# Patient Record
Sex: FEMALE | Race: WHITE | Employment: OTHER | URBAN - NONMETROPOLITAN AREA
[De-identification: names, ages, dates, MRNs, and addresses within clinical notes are randomized per-mention and may not be internally consistent; named-entity substitution may affect disease eponyms.]

---

## 2017-01-04 ENCOUNTER — MED REC SCAN ONLY (OUTPATIENT)
Dept: FAMILY MEDICINE CLINIC | Facility: CLINIC | Age: 82
End: 2017-01-04

## 2017-01-04 ENCOUNTER — TELEPHONE (OUTPATIENT)
Dept: FAMILY MEDICINE CLINIC | Facility: CLINIC | Age: 82
End: 2017-01-04

## 2017-01-04 NOTE — TELEPHONE ENCOUNTER
Calling back on behalf of pt - she is in the hospital at [facility].  Call daughter if you need anything  EDDIE

## 2017-01-12 RX ORDER — RIVAROXABAN 20 MG/1
TABLET, FILM COATED ORAL
Qty: 30 TABLET | Status: SHIPPED | OUTPATIENT
Start: 2017-01-12 | End: 2018-02-07

## 2017-01-20 ENCOUNTER — OFFICE VISIT (OUTPATIENT)
Dept: FAMILY MEDICINE CLINIC | Facility: CLINIC | Age: 82
End: 2017-01-20

## 2017-01-20 VITALS
BODY MASS INDEX: 35 KG/M2 | DIASTOLIC BLOOD PRESSURE: 80 MMHG | TEMPERATURE: 99 F | SYSTOLIC BLOOD PRESSURE: 134 MMHG | WEIGHT: 200 LBS

## 2017-01-20 DIAGNOSIS — J20.9 BRONCHITIS WITH BRONCHOSPASM: Primary | ICD-10-CM

## 2017-01-20 PROCEDURE — 99214 OFFICE O/P EST MOD 30 MIN: CPT | Performed by: FAMILY MEDICINE

## 2017-01-20 RX ORDER — CEFUROXIME AXETIL 250 MG/1
250 TABLET ORAL 2 TIMES DAILY
Qty: 20 TABLET | Refills: 0 | Status: SHIPPED | OUTPATIENT
Start: 2017-01-20 | End: 2017-05-01 | Stop reason: ALTCHOICE

## 2017-01-20 RX ORDER — FLUTICASONE PROPIONATE AND SALMETEROL 250; 50 UG/1; UG/1
1 POWDER RESPIRATORY (INHALATION) EVERY 12 HOURS SCHEDULED
Qty: 60 EACH | Refills: 0 | COMMUNITY
Start: 2017-01-20 | End: 2017-05-01 | Stop reason: ALTCHOICE

## 2017-01-20 RX ORDER — ALBUTEROL SULFATE 90 UG/1
2 AEROSOL, METERED RESPIRATORY (INHALATION) EVERY 6 HOURS PRN
Qty: 1 INHALER | Refills: 0 | Status: SHIPPED | OUTPATIENT
Start: 2017-01-20 | End: 2019-04-30

## 2017-01-20 RX ORDER — FUROSEMIDE 20 MG/1
TABLET ORAL
Refills: 0 | COMMUNITY
Start: 2017-01-18 | End: 2017-04-15

## 2017-01-20 NOTE — PROGRESS NOTES
Naz Crocker is a 80year old female. Patient presents with: Other: cold symptoms; chest congestion, cough, both eye itching, nasal drainage. Светлана Spies tylenol is not effective. room 1      HPI:   Patient complains of cough and chest congestion.   She has been Diabetes mellitus type 2, diet-controlled ([facility] 1/23/2013   • Urethral stenosis 03-     dr. Jocy Abarca   • Spinal stenosis, lumbar 8/11/2014   • Arrhythmia      a fib   • Osteoarthritis 10/14/2013   • Microalbuminuria 8/11/2014   • Ventral hernia 8/11/2 Procedure: LUMBAR EPIDURAL;  Surgeon: Ngozi Sargent MD;  Location: [facility] FOR PAIN MANAGEMENT    PATIENT [address] FOR IV ANTIBIOTIC SURGICAL SITE INFECTION PROPHYLAXIS.  N/A 9/8/2014    Comment Procedure: LUMBAR EPIDURAL;  Surgeon: Meredith Elias MD;  Location: [facility] FOR PAIN MANAGEMENT    PATIENT [address] FOR IV ANTIBIOTIC SURGICAL SITE INFECTION PROPHYLAXIS.  N/A 9/15/2015    Comment Procedure: LUMBAR EPIDURAL;  Surgeon: Meredith Elias MD;  Location: [facility] improvement or anytime PRN. Discussed reactive airway disease. Explained there are multiple known triggers including allergies, upper respiratory infections, exercise, emotional stress and often times a trigger cannot be identified.  Explained that two pro

## 2017-02-23 ENCOUNTER — TELEPHONE (OUTPATIENT)
Dept: FAMILY MEDICINE CLINIC | Facility: CLINIC | Age: 82
End: 2017-02-23

## 2017-02-23 RX ORDER — POTASSIUM CHLORIDE 750 MG/1
10 TABLET, FILM COATED, EXTENDED RELEASE ORAL
Qty: 30 TABLET | Refills: 0 | Status: SHIPPED | OUTPATIENT
Start: 2017-02-23 | End: 2017-03-04

## 2017-03-02 RX ORDER — POTASSIUM CHLORIDE 750 MG/1
TABLET, FILM COATED, EXTENDED RELEASE ORAL
Qty: 30 TABLET | Refills: 0 | OUTPATIENT
Start: 2017-03-02

## 2017-03-04 ENCOUNTER — TELEPHONE (OUTPATIENT)
Dept: FAMILY MEDICINE CLINIC | Facility: CLINIC | Age: 82
End: 2017-03-04

## 2017-03-04 RX ORDER — CARVEDILOL 6.25 MG/1
TABLET ORAL
Qty: 60 TABLET | Refills: 0 | Status: CANCELLED | OUTPATIENT
Start: 2017-03-04

## 2017-03-04 RX ORDER — CARVEDILOL 6.25 MG/1
6.25 TABLET ORAL 2 TIMES DAILY
Qty: 60 TABLET | Refills: 3 | Status: SHIPPED | OUTPATIENT
Start: 2017-03-04 | End: 2017-07-08

## 2017-03-04 RX ORDER — POTASSIUM CHLORIDE 750 MG/1
TABLET, FILM COATED, EXTENDED RELEASE ORAL
Qty: 30 TABLET | Refills: 0 | Status: SHIPPED | OUTPATIENT
Start: 2017-03-04 | End: 2017-04-26

## 2017-03-04 RX ORDER — LISINOPRIL 10 MG/1
TABLET ORAL
Qty: 30 TABLET | Refills: 3 | Status: SHIPPED | OUTPATIENT
Start: 2017-03-04 | End: 2017-08-01 | Stop reason: ALTCHOICE

## 2017-03-04 RX ORDER — LISINOPRIL 10 MG/1
TABLET ORAL
Qty: 30 TABLET | Refills: 0 | Status: CANCELLED | OUTPATIENT
Start: 2017-03-04

## 2017-04-15 ENCOUNTER — TELEPHONE (OUTPATIENT)
Dept: FAMILY MEDICINE CLINIC | Facility: CLINIC | Age: 82
End: 2017-04-15

## 2017-04-15 RX ORDER — FUROSEMIDE 20 MG/1
TABLET ORAL
Qty: 30 TABLET | Refills: 0 | Status: SHIPPED | OUTPATIENT
Start: 2017-04-15 | End: 2017-04-15

## 2017-04-15 NOTE — TELEPHONE ENCOUNTER
When pt was in the hospital in January, they cut this script strenghth in half to 10mg. She wonders if she needs to go back to 20? Her legs and feet swell much more on the 10mg than they did before? ?      When I reviewed the hospital notes from [address] [place] [address] admit

## 2017-04-17 RX ORDER — FUROSEMIDE 20 MG/1
TABLET ORAL
Qty: 90 TABLET | Refills: 0 | Status: SHIPPED | OUTPATIENT
Start: 2017-04-17 | End: 2018-07-17

## 2017-04-26 RX ORDER — POTASSIUM CHLORIDE 750 MG/1
TABLET, FILM COATED, EXTENDED RELEASE ORAL
Qty: 30 TABLET | Refills: 0 | Status: SHIPPED | OUTPATIENT
Start: 2017-04-26 | End: 2017-05-27

## 2017-05-01 ENCOUNTER — OFFICE VISIT (OUTPATIENT)
Dept: FAMILY MEDICINE CLINIC | Facility: CLINIC | Age: 82
End: 2017-05-01

## 2017-05-01 ENCOUNTER — TELEPHONE (OUTPATIENT)
Dept: FAMILY MEDICINE CLINIC | Facility: CLINIC | Age: 82
End: 2017-05-01

## 2017-05-01 VITALS
WEIGHT: 198.38 LBS | DIASTOLIC BLOOD PRESSURE: 80 MMHG | OXYGEN SATURATION: 96 % | BODY MASS INDEX: 35 KG/M2 | SYSTOLIC BLOOD PRESSURE: 148 MMHG | HEART RATE: 73 BPM | TEMPERATURE: 99 F

## 2017-05-01 DIAGNOSIS — N30.00 ACUTE CYSTITIS WITHOUT HEMATURIA: Primary | ICD-10-CM

## 2017-05-01 DIAGNOSIS — I10 ESSENTIAL HYPERTENSION: ICD-10-CM

## 2017-05-01 PROCEDURE — 99214 OFFICE O/P EST MOD 30 MIN: CPT | Performed by: FAMILY MEDICINE

## 2017-05-01 NOTE — PROGRESS NOTES
Britton Parada is a 80year old female. Patient presents with: Other: f/up from [address] on 4/29 for not feeling well, weakness and sweats, fatigue, shakey, no appetite, leg cramp (in hospital)-RT worse--high BP in ER. ...room 2      HPI:   Patient • Cerebral vascular accident, 1/8/2013, right occipital 1/23/2013   • Diabetes mellitus type 2, diet-controlled ([facility] 1/23/2013   • Urethral stenosis 03-     dr. Inés Blandon   • Spinal stenosis, lumbar 8/11/2014   • Arrhythmia      a fib   • Osteoarthr FLUOR GID & [address] [facility] NDL/CATH SPI DX/THER NJX N/A 9/8/2014    Comment Procedure: LUMBAR EPIDURAL;  Surgeon: Kelly Jacques MD;  Location: [facility] FOR PAIN MANAGEMENT    PATIENT [address] FOR IV ANTIBIOTIC SURGICAL SITE INFECTION PROPHYL 9/15/2015    Comment Procedure: LUMBAR EPIDURAL;  Surgeon: Go Esquivel MD;  Location: [address] [place] [address] FOR PAIN MANAGEMENT    PATIENT [address] FOR IV ANTIBIOTIC SURGICAL SITE INFECTION PROPHYLAXIS.  N/A 9/15/2015    Comment Procedure: Baron Dumont with those results as available. We had a long discussion about her need to increase her water intake. Also reviewed all of her current medications in the ER records. Reviewed her blood pressures at home.   They have been running in the 140s-150s systoli

## 2017-05-01 NOTE — TELEPHONE ENCOUNTER
I called [facility] and she advised that they took her mom to [address] ED Saturday night because she got really weak really fast   While at [facility] her BP a few times got really high     The doctor was not sure if her anxiety increases her BP or if her elevated BP caused

## 2017-05-10 ENCOUNTER — TELEPHONE (OUTPATIENT)
Dept: FAMILY MEDICINE CLINIC | Facility: CLINIC | Age: 82
End: 2017-05-10

## 2017-05-10 NOTE — TELEPHONE ENCOUNTER
Calling the patient- she has these UTI's too often and this last one she had no symptoms and then she ended up in the ER. She is wondering if she could drop a urine off every 6-8weeks for me to check to make sure that she is ok? ?     I advised that is fi

## 2017-05-13 NOTE — TELEPHONE ENCOUNTER
Last RF 12/29/16 #60x2  Last ov 5/1/17  Last labs 1/6/17  Future Appointments  Date Time Provider Sonal Ferguson   6/16/2017 1:00 PM Tevin Barton DO [facility] [place]

## 2017-05-15 RX ORDER — DRONEDARONE 400 MG/1
TABLET, FILM COATED ORAL
Qty: 60 TABLET | Refills: 0 | Status: SHIPPED | OUTPATIENT
Start: 2017-05-15 | End: 2017-06-19

## 2017-05-27 RX ORDER — POTASSIUM CHLORIDE 750 MG/1
TABLET, FILM COATED, EXTENDED RELEASE ORAL
Qty: 30 TABLET | Refills: 0 | Status: SHIPPED | OUTPATIENT
Start: 2017-05-27 | End: 2017-06-27

## 2017-05-27 NOTE — TELEPHONE ENCOUNTER
Last rf 4/26/17 #30x0  Last ov 5/1/17  148/80  Last labs 1/6/17     Future Appointments  Date Time Provider Sonal Ferguson   6/16/2017 1:00 PM Jennifer Solis DO [facility] [place]

## 2017-06-01 ENCOUNTER — NURSE ONLY (OUTPATIENT)
Dept: FAMILY MEDICINE CLINIC | Facility: CLINIC | Age: 82
End: 2017-06-01

## 2017-06-01 DIAGNOSIS — N39.0 RECURRENT UTI: Primary | ICD-10-CM

## 2017-06-01 PROCEDURE — 87086 URINE CULTURE/COLONY COUNT: CPT | Performed by: FAMILY MEDICINE

## 2017-06-01 PROCEDURE — 81003 URINALYSIS AUTO W/O SCOPE: CPT | Performed by: FAMILY MEDICINE

## 2017-06-01 PROCEDURE — 87186 SC STD MICRODIL/AGAR DIL: CPT | Performed by: FAMILY MEDICINE

## 2017-06-01 PROCEDURE — 87077 CULTURE AEROBIC IDENTIFY: CPT | Performed by: FAMILY MEDICINE

## 2017-06-01 RX ORDER — CEPHALEXIN 500 MG/1
500 CAPSULE ORAL 3 TIMES DAILY
Qty: 21 CAPSULE | Refills: 0 | Status: SHIPPED | OUTPATIENT
Start: 2017-06-01 | End: 2017-06-08 | Stop reason: WASHOUT

## 2017-06-01 NOTE — PROGRESS NOTES
Quick Note:    Urine appears infected. Send for culture.   Start on Keflex 500 mg 3 times a day for 1 week.  ______

## 2017-06-16 ENCOUNTER — APPOINTMENT (OUTPATIENT)
Dept: LAB | Age: 82
End: 2017-06-16
Attending: FAMILY MEDICINE
Payer: MEDICARE

## 2017-06-16 ENCOUNTER — OFFICE VISIT (OUTPATIENT)
Dept: FAMILY MEDICINE CLINIC | Facility: CLINIC | Age: 82
End: 2017-06-16

## 2017-06-16 VITALS
DIASTOLIC BLOOD PRESSURE: 80 MMHG | BODY MASS INDEX: 36.11 KG/M2 | TEMPERATURE: 99 F | WEIGHT: 201.25 LBS | HEART RATE: 82 BPM | HEIGHT: 62.5 IN | RESPIRATION RATE: 18 BRPM | SYSTOLIC BLOOD PRESSURE: 136 MMHG

## 2017-06-16 DIAGNOSIS — R30.0 DYSURIA: ICD-10-CM

## 2017-06-16 DIAGNOSIS — I50.22 CHRONIC SYSTOLIC CONGESTIVE HEART FAILURE (HCC): ICD-10-CM

## 2017-06-16 DIAGNOSIS — E11.9 DIABETES MELLITUS TYPE 2, DIET-CONTROLLED (HCC): ICD-10-CM

## 2017-06-16 DIAGNOSIS — I10 ESSENTIAL HYPERTENSION: ICD-10-CM

## 2017-06-16 DIAGNOSIS — Z00.00 ENCOUNTER FOR ANNUAL HEALTH EXAMINATION: Primary | ICD-10-CM

## 2017-06-16 DIAGNOSIS — I48.20 CHRONIC ATRIAL FIBRILLATION (HCC): ICD-10-CM

## 2017-06-16 DIAGNOSIS — Z13.31 DEPRESSION SCREENING: ICD-10-CM

## 2017-06-16 DIAGNOSIS — N30.00 ACUTE CYSTITIS WITHOUT HEMATURIA: ICD-10-CM

## 2017-06-16 PROCEDURE — G0444 DEPRESSION SCREEN ANNUAL: HCPCS | Performed by: FAMILY MEDICINE

## 2017-06-16 PROCEDURE — 82043 UR ALBUMIN QUANTITATIVE: CPT

## 2017-06-16 PROCEDURE — 80053 COMPREHEN METABOLIC PANEL: CPT

## 2017-06-16 PROCEDURE — 81003 URINALYSIS AUTO W/O SCOPE: CPT | Performed by: FAMILY MEDICINE

## 2017-06-16 PROCEDURE — 83036 HEMOGLOBIN GLYCOSYLATED A1C: CPT

## 2017-06-16 PROCEDURE — G0439 PPPS, SUBSEQ VISIT: HCPCS | Performed by: FAMILY MEDICINE

## 2017-06-16 PROCEDURE — 36415 COLL VENOUS BLD VENIPUNCTURE: CPT | Performed by: FAMILY MEDICINE

## 2017-06-16 PROCEDURE — 82570 ASSAY OF URINE CREATININE: CPT

## 2017-06-16 PROCEDURE — 36415 COLL VENOUS BLD VENIPUNCTURE: CPT

## 2017-06-16 RX ORDER — CIPROFLOXACIN 250 MG/1
250 TABLET, FILM COATED ORAL 2 TIMES DAILY
Qty: 14 TABLET | Refills: 0 | Status: SHIPPED | OUTPATIENT
Start: 2017-06-16 | End: 2017-06-26

## 2017-06-16 NOTE — PATIENT INSTRUCTIONS
Lisa Chatterjee's SCREENING SCHEDULE   Tests on this list are recommended by your physician but may not be covered, or covered at this frequency, by your insurer. Please check with your insurance carrier before scheduling to verify coverage.    PREVENTATI smoked more than 100 cigarettes in their lifetime   • Anyone with a family history    Colorectal Cancer Screening  Covered up to Age 76     Colonoscopy Screen   Covered every 10 years- more often if abnormal There are no preventive care reminders to joon orders found for this or any previous visit.  Please get every year    Pneumococcal 13 (Prevnar)  Covered Once after 65   Orders placed or performed in visit on 06/09/16  -PNEUMOCOCCAL VACC, 13 LAZRAO IM    Please get once after your 65th birthday    Nimisha Malik Directives.

## 2017-06-16 NOTE — PROGRESS NOTES
HPI:   Tarsha Odonnell is a 80year old female who presents for a Medicare Subsequent Annual Wellness visit (Pt already had Initial Annual Wellness).     No complaints          Patient Care Team: Patient Care Team:  Benito Calhoun DO as PCP - General Pain.      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 Pneumococcal, Zoster, Tetanus     Immunization History   Administered Date(s) Administered   • Fluzone Vaccine Medicare () 09/24/2013   • HIGH DOSE FLU 65 YRS AND OLDER PRSV FREE SINGLE D (38734) FLU CLINIC 11/06/2015   • Influenza 09/06/2012, 10/13/2 provided information      [facility] on file in Epic:    Iman Menjivar does not have a Power of  for [employer] on file in [place].  Discussed with patient and provided information          PLAN:  The patient indicates understandin medically necessary Electrocardiogram date07/21/2015       Colorectal Cancer Screening      Colonoscopy Screen every 10 years There are no preventive care reminders to display for this patient.  Update Health Maintenance if applicable    Flex Sigmoidoscopy Not covered by Medicare Part B No vaccine history found This may be covered with your prescription benefits, but Medicare does not cover unless Medically needed    Zoster  Not covered by Medicare Part B No vaccine history found This may be covered with you

## 2017-06-17 DIAGNOSIS — I10 ESSENTIAL HYPERTENSION: Primary | ICD-10-CM

## 2017-06-17 DIAGNOSIS — E11.9 DIABETES MELLITUS TYPE 2, DIET-CONTROLLED (HCC): ICD-10-CM

## 2017-06-17 NOTE — PROGRESS NOTES
Quick Note:    Notify urinary microalbumin is normal.   This test is an early indicator of kidney disease. Repeat in 1 year. Notify chemistry profile is unremarkable. Recheck in 12 months. Notify HGbA1C is controlled. Recheck in 12 months.     .  __

## 2017-06-19 ENCOUNTER — TELEPHONE (OUTPATIENT)
Dept: FAMILY MEDICINE CLINIC | Facility: CLINIC | Age: 82
End: 2017-06-19

## 2017-06-19 RX ORDER — DRONEDARONE 400 MG/1
TABLET, FILM COATED ORAL
Qty: 60 TABLET | Refills: 0 | Status: SHIPPED | OUTPATIENT
Start: 2017-06-19 | End: 2017-07-20

## 2017-06-19 NOTE — TELEPHONE ENCOUNTER
nanette called:     PT ON CIPRO & MULTAQ--POSSIBILITYOF IRREGULAR HEART BEAT WITH BOTH, IS DR Blanca Ansari

## 2017-06-21 ENCOUNTER — TELEPHONE (OUTPATIENT)
Dept: FAMILY MEDICINE CLINIC | Facility: CLINIC | Age: 82
End: 2017-06-21

## 2017-06-27 RX ORDER — POTASSIUM CHLORIDE 750 MG/1
TABLET, FILM COATED, EXTENDED RELEASE ORAL
Qty: 30 TABLET | Refills: 0 | Status: SHIPPED | OUTPATIENT
Start: 2017-06-27 | End: 2017-08-01

## 2017-07-08 RX ORDER — CARVEDILOL 6.25 MG/1
TABLET ORAL
Qty: 60 TABLET | Refills: 0 | Status: SHIPPED | OUTPATIENT
Start: 2017-07-08 | End: 2017-08-01

## 2017-07-10 RX ORDER — LISINOPRIL 10 MG/1
TABLET ORAL
Qty: 90 TABLET | Refills: 2 | Status: SHIPPED | OUTPATIENT
Start: 2017-07-10 | End: 2018-04-05

## 2017-07-10 RX ORDER — FUROSEMIDE 40 MG/1
TABLET ORAL
Qty: 90 TABLET | Refills: 2 | Status: SHIPPED | OUTPATIENT
Start: 2017-07-10 | End: 2017-07-11 | Stop reason: DRUGHIGH

## 2017-07-10 RX ORDER — CARVEDILOL 6.25 MG/1
TABLET ORAL
Qty: 180 TABLET | Refills: 0 | Status: SHIPPED | OUTPATIENT
Start: 2017-07-10 | End: 2018-01-04

## 2017-07-11 ENCOUNTER — TELEPHONE (OUTPATIENT)
Dept: FAMILY MEDICINE CLINIC | Facility: CLINIC | Age: 82
End: 2017-07-11

## 2017-07-11 ENCOUNTER — NURSE ONLY (OUTPATIENT)
Dept: FAMILY MEDICINE CLINIC | Facility: CLINIC | Age: 82
End: 2017-07-11

## 2017-07-11 DIAGNOSIS — R53.83 FATIGUE, UNSPECIFIED TYPE: Primary | ICD-10-CM

## 2017-07-11 LAB
APPEARANCE: CLEAR
NITRITE, URINE: POSITIVE
PH, URINE: 6.5 (ref 4.5–8)
SPECIFIC GRAVITY: 1.02 (ref 1–1.03)
URINE-COLOR: YELLOW
UROBILINOGEN,SEMI-QN: 0.2 MG/DL (ref 0–1.9)

## 2017-07-11 PROCEDURE — 87086 URINE CULTURE/COLONY COUNT: CPT | Performed by: FAMILY MEDICINE

## 2017-07-11 PROCEDURE — 87186 SC STD MICRODIL/AGAR DIL: CPT | Performed by: FAMILY MEDICINE

## 2017-07-11 PROCEDURE — 81003 URINALYSIS AUTO W/O SCOPE: CPT | Performed by: FAMILY MEDICINE

## 2017-07-11 PROCEDURE — 87077 CULTURE AEROBIC IDENTIFY: CPT | Performed by: FAMILY MEDICINE

## 2017-07-11 RX ORDER — CEPHALEXIN 500 MG/1
500 CAPSULE ORAL 3 TIMES DAILY
Qty: 21 CAPSULE | Refills: 0 | Status: SHIPPED | OUTPATIENT
Start: 2017-07-11 | End: 2017-07-18 | Stop reason: WASHOUT

## 2017-07-11 NOTE — TELEPHONE ENCOUNTER
The patient should only be on 20mg and she can split the 40mg tablets.    Eddie sent a refill request for the 40mg tablets and we approved it so it was an error on both ends     The last refill we did was in April 2017 was 20mg   I advised that she can

## 2017-07-11 NOTE — PROGRESS NOTES
Notify urine looks suspicious for an infection. Send for culture.   Start on Keflex 500 mg 1 tablet 3 times a day for the next 7 days

## 2017-07-11 NOTE — TELEPHONE ENCOUNTER
Spoke with Koko James, daughter, she slept a lot yesterday and the last time she had a UTI this was the only symptom that she had and she ended up in the hospital for UTI    Her coloring is good, she doesn't think that she is dehydrated because she has already

## 2017-07-20 RX ORDER — DRONEDARONE 400 MG/1
TABLET, FILM COATED ORAL
Qty: 60 TABLET | Status: SHIPPED | OUTPATIENT
Start: 2017-07-20 | End: 2018-08-10

## 2017-07-24 ENCOUNTER — NURSE ONLY (OUTPATIENT)
Dept: FAMILY MEDICINE CLINIC | Facility: CLINIC | Age: 82
End: 2017-07-24

## 2017-07-24 ENCOUNTER — TELEPHONE (OUTPATIENT)
Dept: FAMILY MEDICINE CLINIC | Facility: CLINIC | Age: 82
End: 2017-07-24

## 2017-07-24 VITALS — SYSTOLIC BLOOD PRESSURE: 138 MMHG | DIASTOLIC BLOOD PRESSURE: 86 MMHG

## 2017-07-24 DIAGNOSIS — N39.0 CHRONIC UTI: Primary | ICD-10-CM

## 2017-07-24 LAB
APPEARANCE: CLEAR
BILIRUBIN: NEGATIVE
GLUCOSE (URINE DIPSTICK): NEGATIVE MG/DL
KETONES (URINE DIPSTICK): NEGATIVE MG/DL
NITRITE, URINE: POSITIVE
PH, URINE: 6 (ref 4.5–8)
PROTEIN (URINE DIPSTICK): NEGATIVE MG/DL
SPECIFIC GRAVITY: 1.01 (ref 1–1.03)
URINE-COLOR: YELLOW
UROBILINOGEN,SEMI-QN: 0.2 MG/DL (ref 0–1.9)

## 2017-07-24 PROCEDURE — 87086 URINE CULTURE/COLONY COUNT: CPT | Performed by: FAMILY MEDICINE

## 2017-07-24 PROCEDURE — 87186 SC STD MICRODIL/AGAR DIL: CPT | Performed by: FAMILY MEDICINE

## 2017-07-24 PROCEDURE — 87077 CULTURE AEROBIC IDENTIFY: CPT | Performed by: FAMILY MEDICINE

## 2017-07-24 PROCEDURE — 81003 URINALYSIS AUTO W/O SCOPE: CPT | Performed by: FAMILY MEDICINE

## 2017-07-24 RX ORDER — CEPHALEXIN 500 MG/1
500 CAPSULE ORAL 3 TIMES DAILY
Qty: 21 CAPSULE | Refills: 0 | Status: SHIPPED | OUTPATIENT
Start: 2017-07-24 | End: 2017-07-31 | Stop reason: WASHOUT

## 2017-07-24 NOTE — PROGRESS NOTES
Send for culture   Start keflex 500 mg tid x 1 week  Have Asher see me to discuss daily abx for prevention---- has been getting lot more infxns of late

## 2017-07-24 NOTE — ADDENDUM NOTE
Encounter addended by: Yana Matt CNA on: 7/24/2017 12:29 PM<BR>    Actions taken: Chief Complaint modified, Visit diagnoses modified, Diagnosis association updated

## 2017-08-01 ENCOUNTER — OFFICE VISIT (OUTPATIENT)
Dept: FAMILY MEDICINE CLINIC | Facility: CLINIC | Age: 82
End: 2017-08-01

## 2017-08-01 VITALS
BODY MASS INDEX: 36 KG/M2 | WEIGHT: 200.38 LBS | HEART RATE: 71 BPM | SYSTOLIC BLOOD PRESSURE: 120 MMHG | TEMPERATURE: 98 F | DIASTOLIC BLOOD PRESSURE: 80 MMHG | OXYGEN SATURATION: 98 %

## 2017-08-01 DIAGNOSIS — N39.0 RECURRENT UTI: Primary | ICD-10-CM

## 2017-08-01 PROCEDURE — 87077 CULTURE AEROBIC IDENTIFY: CPT | Performed by: FAMILY MEDICINE

## 2017-08-01 PROCEDURE — 99213 OFFICE O/P EST LOW 20 MIN: CPT | Performed by: FAMILY MEDICINE

## 2017-08-01 PROCEDURE — 87086 URINE CULTURE/COLONY COUNT: CPT | Performed by: FAMILY MEDICINE

## 2017-08-01 PROCEDURE — 87186 SC STD MICRODIL/AGAR DIL: CPT | Performed by: FAMILY MEDICINE

## 2017-08-01 RX ORDER — NITROFURANTOIN 25; 75 MG/1; MG/1
100 CAPSULE ORAL EVERY MORNING
Qty: 30 CAPSULE | Refills: 0 | Status: SHIPPED | OUTPATIENT
Start: 2017-08-01 | End: 2017-10-31 | Stop reason: ALTCHOICE

## 2017-08-01 RX ORDER — POTASSIUM CHLORIDE 750 MG/1
TABLET, FILM COATED, EXTENDED RELEASE ORAL
Qty: 90 TABLET | Refills: 3 | Status: SHIPPED | OUTPATIENT
Start: 2017-08-01 | End: 2018-10-14

## 2017-08-01 NOTE — PROGRESS NOTES
Larissa High is a 80year old female. Patient presents with: Other: fup on UTI issues--will finish antibiotics today--refill potassium chloride. ...... room 1      HPI:   Patient has had frequent UTIs.   She has had for E. coli infections in the last few a fib   • Benign positional vertigo    • Cerebral vascular accident, 1/8/2013, right occipital 1/23/2013   • Diabetes mellitus type 2, diet-controlled ([facility] 1/23/2013   • GERD (gastroesophageal reflux disease)    • HTN (hypertension)    • Long term (cur MD;  Location: [place] [address] MANAGEMENT  9/15/2015: INJECTION, W/WO CONTRAST, DX/THERAPEUTIC SUBST* N/A      Comment: Procedure: LUMBAR EPIDURAL;  Surgeon:                Aurelio Mcclain MD;  Location: [place] [address] FOR                PAIN MANAGEMENT  8/12/2015: PATIENT [place] PREOPERATIVE ORDER FOR IV ANT* N/A      Comment: Procedure: LUMBAR EPIDURAL;  Surgeon:                Meredith Elias MD;  Location: [place] [address] MANAGEMENT  8/27/2015: P encounter diagnosis)    Patient is currently finishing a course of Keflex. Recommend that when she is completed that, she started on nitrofurantoin daily as a preventative medication.   She continues to develop UTIs despite the preventative antibiotic, she

## 2017-08-03 ENCOUNTER — TELEPHONE (OUTPATIENT)
Dept: FAMILY MEDICINE CLINIC | Facility: CLINIC | Age: 82
End: 2017-08-03

## 2017-08-03 NOTE — PROGRESS NOTES
Notify urine culture was positive despite being on the antibiotic. The bacteria is a more difficult bacteria to get rid of. At our last visit, my plan was to start Asher on nitrofurantoin as a preventative antibiotic once the current infection was cleared.

## 2017-08-12 ENCOUNTER — TELEPHONE (OUTPATIENT)
Dept: FAMILY MEDICINE CLINIC | Facility: CLINIC | Age: 82
End: 2017-08-12

## 2017-08-12 NOTE — TELEPHONE ENCOUNTER
Dysuria and feels feverish had a urine positive for nitrofurantion, needs Levaquin. I know there an interaction, but  This is the last antibiotic that will be effective, give urine Monday. Told daughter she needs urology! Veronika Doty

## 2017-08-16 ENCOUNTER — TELEPHONE (OUTPATIENT)
Dept: FAMILY MEDICINE CLINIC | Facility: CLINIC | Age: 82
End: 2017-08-16

## 2017-08-16 NOTE — TELEPHONE ENCOUNTER
Calling Kayden John-     She was admitted for pneumonia   They wanted to start her on Symbicort and her mom advised that she had some at home- but she and Gibran Camacho think that is     The hospital was talking about COPD? CHF?      They are worried about her having ano

## 2017-08-16 NOTE — TELEPHONE ENCOUNTER
Wants to discuss pt care and what is to come now that mom is out of the hospital. Nothing urgent.  Please call after 2pm.

## 2017-08-16 NOTE — TELEPHONE ENCOUNTER
Because she failed the preventative antibiotic, she does need to see urology. I would recommend Dr. Beverely Boeck here in town. I tried to get her records from care everywhere but they are not there.   I will need to get them sent over and I will address it summer

## 2017-08-17 NOTE — TELEPHONE ENCOUNTER
Notify hospital records received. Recommend Asher see Dr. New Gilmore in [place]. She should see me next Tuesday or Wednesday.   V/o Dr. Stef Trujillo  Date Time Provider Sonal Ferguson   8/22/2017 3:00 PM DO MINI Brewer E

## 2017-08-18 NOTE — TELEPHONE ENCOUNTER
WHERE DO THEY START? WITH DR BERKOWITZ [facility] OR DO THEY GO TO DR De La Rosa OR URINARY OR CARDIO?

## 2017-08-18 NOTE — TELEPHONE ENCOUNTER
Called Marcel Siddiqui-   She is not getting into [facility] until Sept or October??? They will schedule with Dr Kan Aaron     If Dr Brielle Enrique wants her to see Andres sooner than we can work on this.    She is seeing Dr Brielle Enrique on Tuesday

## 2017-08-21 ENCOUNTER — TELEPHONE (OUTPATIENT)
Dept: FAMILY MEDICINE CLINIC | Facility: CLINIC | Age: 82
End: 2017-08-21

## 2017-08-21 NOTE — TELEPHONE ENCOUNTER
PT IS NOT DOING WELL. SHE IS EXHAUSTED, TIRED WEAK. IS MOVING FROM THE CHAIR TO BED, BUT NOT MUCH MORE. SHE IS EATING BECAUSE DAUGHTER IS THERE TO SERVE HER, OTHERWISE SHE DOES NOT THINK SHE WOULD EAT. IS TAKING IN FLUID.  DAUGHTER HAS JUST NOTICED THAT SHE

## 2017-08-21 NOTE — TELEPHONE ENCOUNTER
Calling Tanya Olvera- Tues/Wed- she was pretty good   Th/Fri- she felt pretty bad   She has been with her mom since yesterday and she is not feeling well. She feels SOB when she is walking but states that she feels like her heart is pounding.  She feels light head

## 2017-09-14 ENCOUNTER — TELEPHONE (OUTPATIENT)
Dept: FAMILY MEDICINE CLINIC | Facility: CLINIC | Age: 82
End: 2017-09-14

## 2017-09-14 NOTE — TELEPHONE ENCOUNTER
Clem Sloan is wondering if Dr Akira Saunders wants them to see Dr Haleigh Talley? . Per note, advised Clem Sloan okay to see Dr Haleigh Talley for Uro issues.

## 2017-10-03 ENCOUNTER — TELEPHONE (OUTPATIENT)
Dept: FAMILY MEDICINE CLINIC | Facility: CLINIC | Age: 82
End: 2017-10-03

## 2017-10-03 RX ORDER — SACCHAROMYCES BOULARDII 250 MG
250 CAPSULE ORAL 2 TIMES DAILY
Qty: 1 CAPSULE | Refills: 0 | COMMUNITY
Start: 2017-10-03 | End: 2017-10-05 | Stop reason: ALTCHOICE

## 2017-10-03 RX ORDER — OMEPRAZOLE 20 MG/1
20 CAPSULE, DELAYED RELEASE ORAL
Qty: 1 CAPSULE | Refills: 0 | COMMUNITY
Start: 2017-10-03 | End: 2021-08-20

## 2017-10-03 RX ORDER — LORATADINE 10 MG/1
10 TABLET ORAL DAILY
Qty: 1 TABLET | Refills: 0 | COMMUNITY
Start: 2017-10-03 | End: 2021-08-12 | Stop reason: ALTCHOICE

## 2017-10-03 RX ORDER — FOLIC ACID 1 MG/1
1 TABLET ORAL DAILY
Qty: 1 TABLET | Refills: 0 | COMMUNITY
Start: 2017-10-03 | End: 2018-04-09 | Stop reason: ALTCHOICE

## 2017-10-03 RX ORDER — FERROUS SULFATE 325(65) MG
325 TABLET ORAL
Qty: 1 TABLET | Refills: 0 | COMMUNITY
Start: 2017-10-03 | End: 2021-01-13

## 2017-10-03 NOTE — TELEPHONE ENCOUNTER
Jensen Rivers advised of below and verbalizes understanding. Medication list updated.   Future Appointments  Date Time Provider Sonal Ferguson   10/5/2017 10:30 AM Jessica Jain,  [facility] [place]

## 2017-10-03 NOTE — TELEPHONE ENCOUNTER
Lisa Oh needs a mammogram and needs a order sent to . Call Libby Penaloza when this done so she can make the appt. Lisatimothy Oh was at THE [facility] for 5 wks and Libby Penaloza has questions.

## 2017-10-03 NOTE — TELEPHONE ENCOUNTER
Patient came home from rehab today. Will see doctor Thursday. Nursing home is ordering nursing, PT, and OT. Advised will follow. She will fax orders.

## 2017-10-03 NOTE — TELEPHONE ENCOUNTER
Released today from [facility]. Is stronger now. Still has UTI. Saw [facility] last week. Has walker and wheelchair. Has no DME needs at this time. Will need a nebulizer some time in the future. Thinks she should probably be seen.   Went through medication

## 2017-10-04 ENCOUNTER — TELEPHONE (OUTPATIENT)
Dept: FAMILY MEDICINE CLINIC | Facility: CLINIC | Age: 82
End: 2017-10-04

## 2017-10-04 NOTE — TELEPHONE ENCOUNTER
Kaitlyn Boyle is home from rehab and is ordered to have some at home care and assistance. Just wanted to update for when they come in tomorrow.  Daughters will be bringing in orders and medication lists, but if Dr. Shanique Laird has an additional orders or medication c

## 2017-10-05 ENCOUNTER — OFFICE VISIT (OUTPATIENT)
Dept: FAMILY MEDICINE CLINIC | Facility: CLINIC | Age: 82
End: 2017-10-05

## 2017-10-05 VITALS
SYSTOLIC BLOOD PRESSURE: 128 MMHG | WEIGHT: 195.5 LBS | DIASTOLIC BLOOD PRESSURE: 70 MMHG | BODY MASS INDEX: 35 KG/M2 | OXYGEN SATURATION: 97 % | TEMPERATURE: 99 F | HEART RATE: 75 BPM

## 2017-10-05 DIAGNOSIS — J45.20 MILD INTERMITTENT REACTIVE AIRWAY DISEASE WITHOUT COMPLICATION: ICD-10-CM

## 2017-10-05 DIAGNOSIS — D50.9 IRON DEFICIENCY ANEMIA, UNSPECIFIED IRON DEFICIENCY ANEMIA TYPE: ICD-10-CM

## 2017-10-05 DIAGNOSIS — Z12.39 BREAST CANCER SCREENING: ICD-10-CM

## 2017-10-05 DIAGNOSIS — N39.0 RECURRENT UTI: Primary | ICD-10-CM

## 2017-10-05 PROBLEM — J45.909 REACTIVE AIRWAY DISEASE: Status: ACTIVE | Noted: 2017-10-05

## 2017-10-05 PROCEDURE — 99214 OFFICE O/P EST MOD 30 MIN: CPT | Performed by: FAMILY MEDICINE

## 2017-10-05 PROCEDURE — 90653 IIV ADJUVANT VACCINE IM: CPT | Performed by: FAMILY MEDICINE

## 2017-10-05 PROCEDURE — G0008 ADMIN INFLUENZA VIRUS VAC: HCPCS | Performed by: FAMILY MEDICINE

## 2017-10-05 RX ORDER — BUDESONIDE 1 MG/2ML
1 INHALANT ORAL 2 TIMES DAILY
Qty: 120 ML | Status: SHIPPED | OUTPATIENT
Start: 2017-10-05 | End: 2019-01-03

## 2017-10-05 RX ORDER — MELATONIN
1000 DAILY
COMMUNITY
End: 2017-10-05 | Stop reason: ALTCHOICE

## 2017-10-05 RX ORDER — ALUMINUM ZIRCONIUM OCTACHLOROHYDREX GLY 16 G/100G
1 GEL TOPICAL DAILY
COMMUNITY
End: 2021-08-12 | Stop reason: ALTCHOICE

## 2017-10-05 RX ORDER — ALBUTEROL SULFATE 2.5 MG/3ML
2.5 SOLUTION RESPIRATORY (INHALATION) EVERY 6 HOURS PRN
Qty: 50 VIAL | Status: SHIPPED | OUTPATIENT
Start: 2017-10-05 | End: 2017-10-19

## 2017-10-05 RX ORDER — LEVOFLOXACIN 500 MG/1
500 TABLET, FILM COATED ORAL DAILY
Refills: 0 | COMMUNITY
Start: 2017-10-03 | End: 2017-10-05 | Stop reason: ALTCHOICE

## 2017-10-05 NOTE — PROGRESS NOTES
HPI:    Nathalie Ritchie is a 80year old female here today for hospital follow up.    She was discharged from {Discharged from which location:7092} to {Discharged to which location:7093::\"Home\"}   Admit Date: ***  Discharge Date: [facility] Discharge Oral Capsule Delayed Release Take 1 capsule (20 mg total) by mouth every morning before breakfast.   Potassium Chloride ER 10 MEQ Oral Tab CR TAKE 1 TABLET(10 MEQ) BY MOUTH EVERY DAY   MULTAQ 400 MG Oral Tab TAKE 1 TABLET BY MOUTH TWICE DAILY WITH MEALS dx/therapeutic substance, epidural/subarachnoid; lumbar/sacral (N/A, 8/25/2014); fluor gid & loclzj ndl/cath spi dx/ther njx (N/A, 8/25/2014); patient withough preoperative order for iv antibiotic surgical site infection prophylaxis.  (N/A, 8/25/2014); lillie 10.00 pack-year smoking history. She has never used smokeless tobacco. She reports that she does not drink alcohol or use drugs.      ROS:   GENERAL: weight stable, energy stable, no sweating  SKIN: denies any unusual skin lesions  EYES: denies blurred visi orders  -     albuterol sulfate (2.5 MG/3ML) 0.083% Inhalation Nebu Soln; Take 3 mL (2.5 mg total) by nebulization every 6 (six) hours as needed for Wheezing.  -     Budesonide 1 MG/2ML Inhalation Suspension;  Take 2 mL (1 mg total) by nebulization 2 (two)

## 2017-10-05 NOTE — PROGRESS NOTES
Dominick Blue is a 80year old female. Patient presents with: Other: f.up from [facility]--was given Norvasc at THE [facility] instead of Lisinopril. ...room 1      HPI:   Patient was hospitalized with pneumonia in early mid August.  She responded well.   After [facility] Albuterol Sulfate HFA (PROAIR HFA) 108 (90 BASE) MCG/ACT Inhalation Aero Soln Inhale 2 puffs into the lungs every 6 (six) hours as needed.  Disp: 1 Inhaler Rfl: 0   XARELTO 20 MG Oral Tab TAKE 1 TABLET BY MOUTH EVERY MORNING Disp: 30 tablet Rfl: PRN   [DI MANAGEMENT  8/25/2014: INJECTION, W/WO CONTRAST, DX/THERAPEUTIC SUBST* N/A      Comment: Procedure: LUMBAR EPIDURAL;  Surgeon:                Elgin Mosqueda MD;  Location: [place] [address] MANAGEMENT  9/8/2014: INJECTION, W/WO CONTRAST, PATIENT DOCUMENTED NOT TO HAVE EXPERIENCED ANY* N/A      Comment: Procedure: LUMBAR EPIDURAL;  Surgeon:                Walter Leigh MD;  Location: [place] [address] MANAGEMENT  9/15/2015: PATIENT DOCUMENTED NOT TO HAVE EXPERIENCED ANY* Alcohol use:  No                 REVIEW OF SYSTEMS:   GENERAL HEALTH: feels well otherwise  SKIN: denies any unusual skin lesions or rashes  RESPIRATORY: denies shortness of breath   CARDIOVASCULAR: denies chest pain   GI: denies nausea, vomiti total) by nebulization every 6 (six) hours as needed for Wheezing. Budesonide 1 MG/2ML Inhalation Suspension 120 mL prn      Sig: Take 2 mL (1 mg total) by nebulization 2 (two) times daily.  Mix with albuterol twice daily           Imaging & Consults:

## 2017-10-10 ENCOUNTER — TELEPHONE (OUTPATIENT)
Dept: FAMILY MEDICINE CLINIC | Facility: CLINIC | Age: 82
End: 2017-10-10

## 2017-10-11 ENCOUNTER — TELEPHONE (OUTPATIENT)
Dept: FAMILY MEDICINE CLINIC | Facility: CLINIC | Age: 82
End: 2017-10-11

## 2017-10-24 ENCOUNTER — TELEPHONE (OUTPATIENT)
Dept: FAMILY MEDICINE CLINIC | Facility: CLINIC | Age: 82
End: 2017-10-24

## 2017-10-24 NOTE — TELEPHONE ENCOUNTER
Per the chart the mammogram was normal and the patient is to repeat in 1 year. Dr Rylan Jefferson should have access to this result.  Patient had done at [address] [place] here in [address]- left detailed message

## 2017-10-24 NOTE — TELEPHONE ENCOUNTER
Pt had a mammo last Wednesday and results need to be sent to  [facility]. Needs to go today as soon as possible. Also, they need the results. Daughter was not sure if it was clear or not.

## 2017-10-25 ENCOUNTER — TELEPHONE (OUTPATIENT)
Dept: FAMILY MEDICINE CLINIC | Facility: CLINIC | Age: 82
End: 2017-10-25

## 2017-10-25 NOTE — TELEPHONE ENCOUNTER
Pt has been in the hospital for a couple of days @ [facility] and is going home today. They are going to continue to see her at home. Wanted to advise doctor. They did receive records from hospital if we need anything. At the moment, no need for reply.  RN will s

## 2017-10-31 ENCOUNTER — OFFICE VISIT (OUTPATIENT)
Dept: FAMILY MEDICINE CLINIC | Facility: CLINIC | Age: 82
End: 2017-10-31

## 2017-10-31 VITALS
HEART RATE: 78 BPM | SYSTOLIC BLOOD PRESSURE: 110 MMHG | TEMPERATURE: 98 F | OXYGEN SATURATION: 90 % | DIASTOLIC BLOOD PRESSURE: 70 MMHG

## 2017-10-31 DIAGNOSIS — I48.20 CHRONIC ATRIAL FIBRILLATION (HCC): ICD-10-CM

## 2017-10-31 DIAGNOSIS — N39.0 RECURRENT UTI: Primary | ICD-10-CM

## 2017-10-31 DIAGNOSIS — I10 ESSENTIAL HYPERTENSION: ICD-10-CM

## 2017-10-31 PROCEDURE — 99214 OFFICE O/P EST MOD 30 MIN: CPT | Performed by: FAMILY MEDICINE

## 2017-10-31 RX ORDER — CEPHALEXIN 500 MG/1
500 CAPSULE ORAL 3 TIMES DAILY
Qty: 30 CAPSULE | Refills: 0 | Status: SHIPPED | OUTPATIENT
Start: 2017-10-31 | End: 2017-11-15 | Stop reason: ALTCHOICE

## 2017-10-31 NOTE — PROGRESS NOTES
Janelle Segovia is a 80year old female. Patient presents with: Other: F/U from UTI 10/22/17 admitted to [facility]. Released 10/25/17. 875 amoxicillin when released from hospital. Pt would like to discuss legs. Redness, itches, dry. both legs.  Room 1 MULTAQ 400 MG Oral Tab TAKE 1 TABLET BY MOUTH TWICE DAILY WITH MEALS Disp: 60 tablet Rfl: prn   LISINOPRIL 10 MG Oral Tab TAKE 1 TABLET BY MOUTH EVERY EVENING Disp: 90 tablet Rfl: 2   CARVEDILOL 6.25 MG Oral Tab TAKE 1 TABLET BY MOUTH TWICE DAILY Disp: 1 Surgeon:                Karine Leroy MD;  Location: [place] [address] MANAGEMENT  8/25/2014: INJECTION, W/WO CONTRAST, DX/THERAPEUTIC SUBST* N/A      Comment: Procedure: LUMBAR EPIDURAL;  Surgeon:                Karine Leroy MD;  Laly Salgado Sanjiv Glaser MD;  Location: [place] [address] MANAGEMENT  8/27/2015: PATIENT DOCUMENTED NOT TO HAVE EXPERIENCED ANY* N/A      Comment: Procedure: LUMBAR EPIDURAL;  Surgeon:                Sanjiv Glaser MD;  Location: [address] [place] [address]  Types: Cigarettes     Quit date: 1/23/1981  Smokeless tobacco: Never Used                      Alcohol use:  No                 REVIEW OF SYSTEMS:   GENERAL HEALTH: feels well otherwise  SKIN: denies any unusual skin lesions or rashes  RESPIRATORY: d

## 2017-11-01 ENCOUNTER — MED REC SCAN ONLY (OUTPATIENT)
Dept: FAMILY MEDICINE CLINIC | Facility: CLINIC | Age: 82
End: 2017-11-01

## 2017-11-15 ENCOUNTER — TELEPHONE (OUTPATIENT)
Dept: FAMILY MEDICINE CLINIC | Facility: CLINIC | Age: 82
End: 2017-11-15

## 2017-11-15 ENCOUNTER — NURSE ONLY (OUTPATIENT)
Dept: FAMILY MEDICINE CLINIC | Facility: CLINIC | Age: 82
End: 2017-11-15

## 2017-11-15 DIAGNOSIS — N39.0 RECURRENT UTI: Primary | ICD-10-CM

## 2017-11-15 PROCEDURE — 87077 CULTURE AEROBIC IDENTIFY: CPT | Performed by: FAMILY MEDICINE

## 2017-11-15 PROCEDURE — 87086 URINE CULTURE/COLONY COUNT: CPT | Performed by: FAMILY MEDICINE

## 2017-11-15 PROCEDURE — 87186 SC STD MICRODIL/AGAR DIL: CPT | Performed by: FAMILY MEDICINE

## 2017-11-15 PROCEDURE — 81003 URINALYSIS AUTO W/O SCOPE: CPT | Performed by: FAMILY MEDICINE

## 2017-11-15 RX ORDER — CIPROFLOXACIN 250 MG/1
250 TABLET, FILM COATED ORAL 2 TIMES DAILY
Qty: 14 TABLET | Refills: 0 | Status: SHIPPED | OUTPATIENT
Start: 2017-11-15 | End: 2017-11-22

## 2017-11-21 ENCOUNTER — TELEPHONE (OUTPATIENT)
Dept: FAMILY MEDICINE CLINIC | Facility: CLINIC | Age: 82
End: 2017-11-21

## 2017-11-21 NOTE — TELEPHONE ENCOUNTER
Calling Phyllis Henderson I explained and she verbalized her understanding   The patient sees Dr Molly Paul on Monday for her 2nd cystoscopy

## 2017-11-21 NOTE — TELEPHONE ENCOUNTER
Shouldn't they be asking Dr Johanna Kwon? Or do we need to test her again?    Antibiotic will be finished today

## 2017-11-21 NOTE — TELEPHONE ENCOUNTER
Recheck urine for culture after done with antibiotics for 2 days, (check on Friday)  If culture negative, will remain off abx to see if estrace cream and dilation worked

## 2017-11-24 ENCOUNTER — NURSE ONLY (OUTPATIENT)
Dept: FAMILY MEDICINE CLINIC | Facility: CLINIC | Age: 82
End: 2017-11-24

## 2017-11-24 DIAGNOSIS — N39.0 CHRONIC UTI: Primary | ICD-10-CM

## 2017-11-24 PROCEDURE — 87086 URINE CULTURE/COLONY COUNT: CPT | Performed by: FAMILY MEDICINE

## 2017-11-24 PROCEDURE — 87186 SC STD MICRODIL/AGAR DIL: CPT | Performed by: FAMILY MEDICINE

## 2017-11-24 PROCEDURE — 87088 URINE BACTERIA CULTURE: CPT | Performed by: FAMILY MEDICINE

## 2017-11-27 NOTE — PROGRESS NOTES
Notify the repeat urine culture is positive again. Please send these results to Dr. Rylan Jefferson. Start Keflex 500 mg 3 times daily for 1 week.

## 2017-12-08 ENCOUNTER — MED REC SCAN ONLY (OUTPATIENT)
Dept: FAMILY MEDICINE CLINIC | Facility: CLINIC | Age: 82
End: 2017-12-08

## 2017-12-08 NOTE — PROGRESS NOTES
Received fax from [address] [place] [address], discontinuing PT services. Dr. Sierra Matt signed and faxed back. Original sent to scanning.

## 2018-01-04 RX ORDER — CARVEDILOL 6.25 MG/1
TABLET ORAL
Qty: 180 TABLET | Refills: 0 | Status: SHIPPED | OUTPATIENT
Start: 2018-01-04 | End: 2018-05-15

## 2018-01-23 ENCOUNTER — OFFICE VISIT (OUTPATIENT)
Dept: FAMILY MEDICINE CLINIC | Facility: CLINIC | Age: 83
End: 2018-01-23

## 2018-01-23 ENCOUNTER — TELEPHONE (OUTPATIENT)
Dept: FAMILY MEDICINE CLINIC | Facility: CLINIC | Age: 83
End: 2018-01-23

## 2018-01-23 VITALS
TEMPERATURE: 98 F | DIASTOLIC BLOOD PRESSURE: 82 MMHG | SYSTOLIC BLOOD PRESSURE: 126 MMHG | WEIGHT: 199 LBS | BODY MASS INDEX: 37.57 KG/M2 | HEIGHT: 61 IN | HEART RATE: 76 BPM | RESPIRATION RATE: 16 BRPM

## 2018-01-23 DIAGNOSIS — R10.32 ABDOMINAL PAIN, LEFT LOWER QUADRANT: Primary | ICD-10-CM

## 2018-01-23 DIAGNOSIS — R10.32 LEFT LOWER QUADRANT PAIN: Primary | ICD-10-CM

## 2018-01-23 DIAGNOSIS — I50.9 CHRONIC CONGESTIVE HEART FAILURE, UNSPECIFIED CONGESTIVE HEART FAILURE TYPE: Primary | ICD-10-CM

## 2018-01-23 PROCEDURE — 99214 OFFICE O/P EST MOD 30 MIN: CPT | Performed by: FAMILY MEDICINE

## 2018-01-23 NOTE — PROGRESS NOTES
Henna Harris is a 80year old female. Patient presents with:  Abdominal Pain: left side, no trigger, increasing room 1      HPI:   Patient complains of chronic pain to the left lower quadrant of her abdomen. She has had it for 2-3 years.   She had a CT XARELTO 20 MG Oral Tab TAKE 1 TABLET BY MOUTH EVERY MORNING Disp: 30 tablet Rfl: PRN   Fluticasone Propionate (FLONASE) 50 MCG/ACT Nasal Suspension 2 sprays by Nasal route daily.  Disp: 1 Bottle Rfl: 3     No current facility-administered medications on f MANAGEMENT  8/12/2015: INJECTION, W/WO CONTRAST, DX/THERAPEUTIC SUBST* N/A      Comment: Procedure: LUMBAR EPIDURAL;  Surgeon:                Chele Zhu MD;  Location: [place] [address] MANAGEMENT  8/27/2015: INJECTION, W/WO CONTRAST, [facility] PRE[facility] ORDER FOR IV ANT* N/A      Comment: Procedure: LUMBAR EPIDURAL;  Surgeon:                Ifeoma Anderson MD;  Location: [place] [address] MANAGEMENT  9/8/2014: [facility]IENT [facility] PREOPERATIVE ORDER FOR IV ANT* N BMI 37.60 kg/m²   GENERAL: well developed, well nourished,in no apparent distress  SKIN: no rashes,no suspicious lesions  HEENT: atraumatic, normocephalic, R TM normal, L TM normal, Pharynx normal  NECK: supple, no cervical adenopathy  LUNGS: clear to [employer]

## 2018-01-23 NOTE — TELEPHONE ENCOUNTER
Calling to schedule the patient for next Tuesday 1/30/18 after 9am @ [facility]     Spoke to Kari again- the patient will need a BUN/Creatinine     1pm on 1/30/18  NPO 4 hours prior  She can take medication w/ sips of water   She will need to be there by 12:30pm

## 2018-01-23 NOTE — TELEPHONE ENCOUNTER
Calling Luis estrada - spoke with Giulia Constantino-     Needs to state IV contrast  Faxing a corrected order

## 2018-01-31 ENCOUNTER — TELEPHONE (OUTPATIENT)
Dept: FAMILY MEDICINE CLINIC | Facility: CLINIC | Age: 83
End: 2018-01-31

## 2018-01-31 ENCOUNTER — MED REC SCAN ONLY (OUTPATIENT)
Dept: FAMILY MEDICINE CLINIC | Facility: CLINIC | Age: 83
End: 2018-01-31

## 2018-01-31 NOTE — TELEPHONE ENCOUNTER
Calling the patient to notify that the CT scan was uremarkable.  There is some bladder inflammation, but this is due to the recurrent UTIs

## 2018-01-31 NOTE — TELEPHONE ENCOUNTER
Calling the patient back-     What does she do? She doesn't want to go to therapy again. She said that when she went to therapy it helped with some things but not others. She has a stretch that she is supposed to do 2-3x/day every day.  She doesn't so i

## 2018-02-01 ENCOUNTER — TELEPHONE (OUTPATIENT)
Dept: FAMILY MEDICINE CLINIC | Facility: CLINIC | Age: 83
End: 2018-02-01

## 2018-02-01 NOTE — TELEPHONE ENCOUNTER
APPT TODAY AT 10AM FOR PEDICURE, AND TOENAIL FELL OFF. CAN SHE STILL HAVE PEDICURE DONE? ALSO CAN YOU GIVE CT RESULTS TO DAUGHTER.

## 2018-02-08 RX ORDER — RIVAROXABAN 20 MG/1
TABLET, FILM COATED ORAL
Qty: 30 TABLET | Refills: 10 | Status: SHIPPED | OUTPATIENT
Start: 2018-02-08 | End: 2019-01-24

## 2018-02-22 ENCOUNTER — TELEPHONE (OUTPATIENT)
Dept: FAMILY MEDICINE CLINIC | Facility: CLINIC | Age: 83
End: 2018-02-22

## 2018-02-22 ENCOUNTER — NURSE ONLY (OUTPATIENT)
Dept: FAMILY MEDICINE CLINIC | Facility: CLINIC | Age: 83
End: 2018-02-22

## 2018-02-22 DIAGNOSIS — N39.0 RECURRENT UTI: Primary | ICD-10-CM

## 2018-02-22 DIAGNOSIS — R35.0 URINARY FREQUENCY: ICD-10-CM

## 2018-02-22 DIAGNOSIS — R30.0 DYSURIA: ICD-10-CM

## 2018-02-22 DIAGNOSIS — R30.0 DYSURIA: Primary | ICD-10-CM

## 2018-02-22 LAB
BILIRUBIN: NEGATIVE
GLUCOSE (URINE DIPSTICK): NEGATIVE MG/DL
KETONES (URINE DIPSTICK): NEGATIVE MG/DL
MULTISTIX LOT#: ABNORMAL NUMERIC
NITRITE, URINE: NEGATIVE
PH, URINE: 7 (ref 4.5–8)
PROTEIN (URINE DIPSTICK): 100 MG/DL
SPECIFIC GRAVITY: 1.02 (ref 1–1.03)
URINE-COLOR: YELLOW
UROBILINOGEN,SEMI-QN: 0.2 MG/DL (ref 0–1.9)

## 2018-02-22 PROCEDURE — 87086 URINE CULTURE/COLONY COUNT: CPT | Performed by: FAMILY MEDICINE

## 2018-02-22 PROCEDURE — 87186 SC STD MICRODIL/AGAR DIL: CPT | Performed by: FAMILY MEDICINE

## 2018-02-22 PROCEDURE — 81003 URINALYSIS AUTO W/O SCOPE: CPT | Performed by: FAMILY MEDICINE

## 2018-02-22 PROCEDURE — 87077 CULTURE AEROBIC IDENTIFY: CPT | Performed by: FAMILY MEDICINE

## 2018-02-22 RX ORDER — CEPHALEXIN 500 MG/1
500 CAPSULE ORAL 3 TIMES DAILY
Qty: 21 CAPSULE | Refills: 0 | Status: SHIPPED | OUTPATIENT
Start: 2018-02-22 | End: 2018-03-01 | Stop reason: WASHOUT

## 2018-02-22 NOTE — TELEPHONE ENCOUNTER
Calling Steven Bright- we need to get a urine     Frequency and dysuria!!  She is actually having symptoms this time   Steven Bright advised that her brother will be bringing in a urine early afternoon for us and he can  the prescription  In the meantime can we go a

## 2018-02-22 NOTE — TELEPHONE ENCOUNTER
SHE HAS ANOTHER UTI AND IS ASKING FOR MEDICATION. MARCIA SAID THAT EITHER KEFELEX OR AMPICILLIN WORK FOR HER AND HER NEXT APPOINTMENT WITH DR NAVARRO IS MARCH 11TH.     YOU CAN CALL MARCIA [phone]

## 2018-02-22 NOTE — TELEPHONE ENCOUNTER
Start keflex 500 mg tid x 1 week  Send urine for culture and report copy to Dr Beverely Boeck once available

## 2018-02-26 NOTE — PROGRESS NOTES
Notify urine culture positive.   Need to finish all antibiotics  Forward results to Dr. Juancarlos Brooks

## 2018-03-29 ENCOUNTER — TELEPHONE (OUTPATIENT)
Dept: FAMILY MEDICINE CLINIC | Facility: CLINIC | Age: 83
End: 2018-03-29

## 2018-03-29 DIAGNOSIS — M54.30 SCIATICA, UNSPECIFIED LATERALITY: ICD-10-CM

## 2018-03-29 DIAGNOSIS — S76.019A STRAIN OF PSOAS MUSCLE, UNSPECIFIED LATERALITY, INITIAL ENCOUNTER: Primary | ICD-10-CM

## 2018-03-29 NOTE — TELEPHONE ENCOUNTER
Dr Tilton Rubinstein has suggested physical therapy for patient. Sciatic stenosis, psoas abdominal tenderness. Dr Tilton Rubinstein thinks its muscular.       Where would Dr Shobha Link send her that would be good with older patients

## 2018-03-29 NOTE — TELEPHONE ENCOUNTER
Jemima Anne Nurse   Caller: Unspecified (Today,  4:17 PM)              [facility] RET[facility] A CALL ABOUT Simon Damian advised. Phone number given to schedule. We will fax order. Order pended.

## 2018-04-05 RX ORDER — LISINOPRIL 10 MG/1
TABLET ORAL
Qty: 90 TABLET | Refills: 0 | Status: SHIPPED | OUTPATIENT
Start: 2018-04-05 | End: 2018-04-30

## 2018-04-05 NOTE — TELEPHONE ENCOUNTER
Patient advised of below and verbalizes understanding.   Future Appointments  Date Time Provider Sonal Ferguson   4/9/2018 11:15 AM Marixa Zhao, DO [facility] [place]   6/19/2018 9:30 AM Marixa Zhao, DO [facility] [place]

## 2018-04-05 NOTE — TELEPHONE ENCOUNTER
Last office visit 1-23-18  Last BP 3-29-18 200/95  Last refill 7-10-17 #90 with 2  Future Appointments  Date Time Provider Sonal Ferguson   6/19/2018 9:30 AM DO DAYSI PaulSW [facility] [place]     Routing to PCP regarding elevated BP at Dr. Ignacio Marr

## 2018-04-06 ENCOUNTER — TELEPHONE (OUTPATIENT)
Dept: FAMILY MEDICINE CLINIC | Facility: CLINIC | Age: 83
End: 2018-04-06

## 2018-04-06 NOTE — TELEPHONE ENCOUNTER
Future Appointments  Date Time Provider Sonal Ferguson   4/9/2018 11:15 AM Jeane Sommers,  [facility] [place]   6/19/2018 9:30 AM Jeane Sommers,  [facility] [place]     Calling [place] to advise that it does not appear that she is due for an

## 2018-04-09 ENCOUNTER — OFFICE VISIT (OUTPATIENT)
Dept: FAMILY MEDICINE CLINIC | Facility: CLINIC | Age: 83
End: 2018-04-09

## 2018-04-09 VITALS
OXYGEN SATURATION: 98 % | DIASTOLIC BLOOD PRESSURE: 88 MMHG | SYSTOLIC BLOOD PRESSURE: 130 MMHG | WEIGHT: 199.38 LBS | BODY MASS INDEX: 38 KG/M2 | HEART RATE: 64 BPM | TEMPERATURE: 98 F

## 2018-04-09 DIAGNOSIS — N39.0 RECURRENT UTI: ICD-10-CM

## 2018-04-09 DIAGNOSIS — E11.9 DIABETES MELLITUS TYPE 2, DIET-CONTROLLED (HCC): ICD-10-CM

## 2018-04-09 DIAGNOSIS — I10 ESSENTIAL HYPERTENSION: Primary | ICD-10-CM

## 2018-04-09 PROCEDURE — 99214 OFFICE O/P EST MOD 30 MIN: CPT | Performed by: FAMILY MEDICINE

## 2018-04-09 NOTE — PROGRESS NOTES
Shira Jaramillo is a 80year old female. Patient presents with: Other: BP check up, med check. ... Yolie Yu room 1      HPI:   Patient was seen by Dr. Shruthi Berman. At that office visit her blood pressure was 200/95. She states she was nervous.   Her pressure was check Rfl: 0   Fluticasone Propionate (FLONASE) 50 MCG/ACT Nasal Suspension 2 sprays by Nasal route daily. Disp: 1 Bottle Rfl: 3     No current facility-administered medications on file prior to visit.       Past Medical History:   Diagnosis Date   • Atrial fibri DX/THERAPEUTIC SUBST* N/A      Comment: Procedure: LUMBAR EPIDURAL;  Surgeon:                Karine Leroy MD;  Location: [place] [address] MANAGEMENT  8/27/2015: INJECTION, W/WO CONTRAST, DX/THERAPEUTIC SUBST* N/A      Comment: Procedur N/A      Comment: Procedure: LUMBAR EPIDURAL;  Surgeon:                Wilfrido Trevizo MD;  Location: [place] [address] MANAGEMENT  9/8/2014: PATIENT [facility] PREOPERATIVE ORDER FOR IV ANT* N/A      Comment: Procedure: LUMBAR EPIDURAL;  Navarrete headaches    EXAM:   /88   Pulse 64   Temp 97.6 °F (36.4 °C) (Tympanic)   Wt 199 lb 6 oz   SpO2 98%   BMI 37.67 kg/m²   GENERAL: well developed, well nourished,in no apparent distress  SKIN: no rashes,no suspicious lesions  HEENT: atraumatic, normoce

## 2018-04-14 ENCOUNTER — MED REC SCAN ONLY (OUTPATIENT)
Dept: FAMILY MEDICINE CLINIC | Facility: CLINIC | Age: 83
End: 2018-04-14

## 2018-04-30 RX ORDER — LISINOPRIL 10 MG/1
TABLET ORAL
Qty: 90 TABLET | Refills: 0 | Status: SHIPPED | OUTPATIENT
Start: 2018-04-30 | End: 2018-09-26

## 2018-05-15 RX ORDER — CARVEDILOL 6.25 MG/1
TABLET ORAL
Qty: 180 TABLET | Refills: 0 | Status: SHIPPED | OUTPATIENT
Start: 2018-05-15 | End: 2018-07-19

## 2018-05-31 ENCOUNTER — TELEPHONE (OUTPATIENT)
Dept: FAMILY MEDICINE CLINIC | Facility: CLINIC | Age: 83
End: 2018-05-31

## 2018-05-31 NOTE — TELEPHONE ENCOUNTER
Future Appointments  Date Time Provider Sonal Ferguson   6/19/2018 9:30 AM Constance Jackson DO EM[facility] and gave them NEENA information

## 2018-06-05 RX ORDER — ALBUTEROL SULFATE 2.5 MG/3ML
2.5 SOLUTION RESPIRATORY (INHALATION) EVERY 6 HOURS PRN
Qty: 180 VIAL | Refills: 0 | Status: SHIPPED | OUTPATIENT
Start: 2018-06-05 | End: 2018-06-19

## 2018-07-06 ENCOUNTER — OFFICE VISIT (OUTPATIENT)
Dept: FAMILY MEDICINE CLINIC | Facility: CLINIC | Age: 83
End: 2018-07-06

## 2018-07-06 VITALS
SYSTOLIC BLOOD PRESSURE: 128 MMHG | TEMPERATURE: 99 F | WEIGHT: 199 LBS | HEIGHT: 61 IN | BODY MASS INDEX: 37.57 KG/M2 | HEART RATE: 76 BPM | OXYGEN SATURATION: 97 % | DIASTOLIC BLOOD PRESSURE: 74 MMHG

## 2018-07-06 DIAGNOSIS — I48.20 CHRONIC ATRIAL FIBRILLATION (HCC): ICD-10-CM

## 2018-07-06 DIAGNOSIS — I10 ESSENTIAL HYPERTENSION: ICD-10-CM

## 2018-07-06 DIAGNOSIS — I50.9 CHRONIC CONGESTIVE HEART FAILURE, UNSPECIFIED HEART FAILURE TYPE (HCC): ICD-10-CM

## 2018-07-06 DIAGNOSIS — D50.9 IRON DEFICIENCY ANEMIA, UNSPECIFIED IRON DEFICIENCY ANEMIA TYPE: ICD-10-CM

## 2018-07-06 DIAGNOSIS — Z13.31 DEPRESSION SCREENING: ICD-10-CM

## 2018-07-06 DIAGNOSIS — Z00.00 ENCOUNTER FOR ANNUAL HEALTH EXAMINATION: Primary | ICD-10-CM

## 2018-07-06 DIAGNOSIS — N39.0 RECURRENT UTI: ICD-10-CM

## 2018-07-06 DIAGNOSIS — E11.9 DIABETES MELLITUS TYPE 2, DIET-CONTROLLED (HCC): ICD-10-CM

## 2018-07-06 LAB
ALBUMIN SERPL-MCNC: 3.3 G/DL (ref 3.5–4.8)
ALP LIVER SERPL-CCNC: 90 U/L (ref 55–142)
ALT SERPL-CCNC: 21 U/L (ref 14–54)
AST SERPL-CCNC: 21 U/L (ref 15–41)
BASOPHILS # BLD AUTO: 0.02 X10(3) UL (ref 0–0.1)
BASOPHILS NFR BLD AUTO: 0.3 %
BILIRUB SERPL-MCNC: 0.6 MG/DL (ref 0.1–2)
BUN BLD-MCNC: 25 MG/DL (ref 8–20)
CALCIUM BLD-MCNC: 9.5 MG/DL (ref 8.3–10.3)
CHLORIDE: 106 MMOL/L (ref 101–111)
CO2: 25 MMOL/L (ref 22–32)
CREAT BLD-MCNC: 1 MG/DL (ref 0.55–1.02)
DEPRECATED HBV CORE AB SER IA-ACNC: 149.9 NG/ML (ref 18–340)
EOSINOPHIL # BLD AUTO: 0.23 X10(3) UL (ref 0–0.3)
EOSINOPHIL NFR BLD AUTO: 3.1 %
ERYTHROCYTE [DISTWIDTH] IN BLOOD BY AUTOMATED COUNT: 13.6 % (ref 11.5–16)
GLUCOSE BLD-MCNC: 92 MG/DL (ref 70–99)
HCT VFR BLD AUTO: 43.3 % (ref 34–50)
HGB BLD-MCNC: 13.9 G/DL (ref 12–16)
IMMATURE GRANULOCYTE COUNT: 0.02 X10(3) UL (ref 0–1)
IMMATURE GRANULOCYTE RATIO %: 0.3 %
LYMPHOCYTES # BLD AUTO: 1.61 X10(3) UL (ref 0.9–4)
LYMPHOCYTES NFR BLD AUTO: 21.4 %
M PROTEIN MFR SERPL ELPH: 8.2 G/DL (ref 6.1–8.3)
MCH RBC QN AUTO: 32.7 PG (ref 27–33.2)
MCHC RBC AUTO-ENTMCNC: 32.1 G/DL (ref 31–37)
MCV RBC AUTO: 101.9 FL (ref 81–100)
MONOCYTES # BLD AUTO: 0.67 X10(3) UL (ref 0.1–1)
MONOCYTES NFR BLD AUTO: 8.9 %
NEUTROPHIL ABS PRELIM: 4.97 X10 (3) UL (ref 1.3–6.7)
NEUTROPHILS # BLD AUTO: 4.97 X10(3) UL (ref 1.3–6.7)
NEUTROPHILS NFR BLD AUTO: 66 %
PLATELET # BLD AUTO: 258 10(3)UL (ref 150–450)
POTASSIUM SERPL-SCNC: 4.5 MMOL/L (ref 3.6–5.1)
RBC # BLD AUTO: 4.25 X10(6)UL (ref 3.8–5.1)
RED CELL DISTRIBUTION WIDTH-SD: 50.7 FL (ref 35.1–46.3)
SODIUM SERPL-SCNC: 140 MMOL/L (ref 136–144)
WBC # BLD AUTO: 7.5 X10(3) UL (ref 4–13)

## 2018-07-06 PROCEDURE — 82746 ASSAY OF FOLIC ACID SERUM: CPT | Performed by: FAMILY MEDICINE

## 2018-07-06 PROCEDURE — 36415 COLL VENOUS BLD VENIPUNCTURE: CPT | Performed by: FAMILY MEDICINE

## 2018-07-06 PROCEDURE — 85025 COMPLETE CBC W/AUTO DIFF WBC: CPT | Performed by: FAMILY MEDICINE

## 2018-07-06 PROCEDURE — 80053 COMPREHEN METABOLIC PANEL: CPT | Performed by: FAMILY MEDICINE

## 2018-07-06 PROCEDURE — G0444 DEPRESSION SCREEN ANNUAL: HCPCS | Performed by: FAMILY MEDICINE

## 2018-07-06 PROCEDURE — G0439 PPPS, SUBSEQ VISIT: HCPCS | Performed by: FAMILY MEDICINE

## 2018-07-06 PROCEDURE — 82728 ASSAY OF FERRITIN: CPT | Performed by: FAMILY MEDICINE

## 2018-07-06 PROCEDURE — 82607 VITAMIN B-12: CPT | Performed by: FAMILY MEDICINE

## 2018-07-06 RX ORDER — CEPHALEXIN 500 MG/1
500 CAPSULE ORAL 3 TIMES DAILY
Qty: 21 CAPSULE | Status: SHIPPED | OUTPATIENT
Start: 2018-07-06 | End: 2019-01-03 | Stop reason: ALTCHOICE

## 2018-07-06 NOTE — PATIENT INSTRUCTIONS
Rakesh Chatterjee's SCREENING SCHEDULE   Tests on this list are recommended by your physician but may not be covered, or covered at this frequency, by your insurer. Please check with your insurance carrier before scheduling to verify coverage.    PREVENTATI any previous visit.  Limited to patients who meet one of the following criteria:   • Men who are 73-68 years old and have smoked more than 100 cigarettes in their lifetime   • Anyone with a family history    Colorectal Cancer Screening  Covered up to Age 76 reminders to display for this patient. Please get this Mammogram regularly   Immunizations      Influenza  Covered Annually No orders found for this or any previous visit.  Please get every year    Pneumococcal 13 (Prevnar)  Covered Once after 65   Orders p available in [address])  [email or web address]  This link also has information from the [address] [place] [address] regarding Advance Directives.

## 2018-07-06 NOTE — PROGRESS NOTES
HPI:   Naz Crocker is a 80year old female who presents for a Medicare Subsequent Annual Wellness visit (Pt already had Initial Annual Wellness).     No complaints           Fall/Risk Assessment   She has been screened for Falls and is low risk: Fall/ 35.00        Types: Cigarettes     Quit date: 1/23/1987  Smokeless tobacco: Never Used                      Comment: 5 packs per week for 35 years. Mari Kuo       Ms. Suarez Devin has an allergy or contraindication to taking aspirin (and it is documented in th Budesonide 1 MG/2ML Inhalation Suspension Take 2 mL (1 mg total) by nebulization 2 (two) times daily. Mix with albuterol twice daily   Ferrous Sulfate 325 (65 Fe) MG Oral Tab Take 1 tablet (325 mg total) by mouth 2 (two) times daily.    loratadine (CLARIT patient documented not to have experienced any of the following events (N/A, 8/25/2014); laparoscopic incisional / umbilical / ventral hernia repair (9/10/2014); injection, w/wo contrast, dx/therapeutic substance, epidural/subarachnoid; lumbar/sacral (N/A, SYSTEMS:        EXAM:   /74   Pulse 76   Temp 99.3 °F (37.4 °C) (Temporal)   Ht 61\"   Wt 199 lb   SpO2 97%   BMI 37.60 kg/m²  Estimated body mass index is 37.6 kg/m² as calculated from the following:    Height as of this encounter: 61\".     Weight a and agrees to the plan. Reinforced healthy diet, lifestyle, and exercise. No Follow-up on file.      Celeste Kilpatrick DO, 7/6/2018     General Health     In the past six months, have you lost more than 10 pounds without trying?: 2 - No  Has your appet No flowsheet data found. Pap and Pelvic      Pap: Every 3 yrs age 21-68 or Pap+HPV every 5 yrs age 33-67, age 72 and older at high risk There are no preventive care reminders to display for this patient.  Update [employer] if applicable    Chlamy 01/20/2014 0.76     Creatinine (mg/dL)   Date Value   06/16/2017 0.96    No flowsheet data found. Drug Serum Conc  Annually No results found for: DIGOXIN, DIG, VALP No flowsheet data found.        Diabetes      HgbA1C  Annually HGBA1C (%)   Date Value

## 2018-07-07 LAB
FOLATE (FOLIC ACID), SERUM: 27 NG/ML (ref 8.7–24)
HAV AB SERPL IA-ACNC: 340 PG/ML (ref 193–986)

## 2018-07-10 ENCOUNTER — TELEPHONE (OUTPATIENT)
Dept: FAMILY MEDICINE CLINIC | Facility: CLINIC | Age: 83
End: 2018-07-10

## 2018-07-10 NOTE — TELEPHONE ENCOUNTER
Peyton Vale is dropping off a handicap form to be filled out. FYI the form says it has to be mailed from the office.

## 2018-07-12 ENCOUNTER — TELEPHONE (OUTPATIENT)
Dept: FAMILY MEDICINE CLINIC | Facility: CLINIC | Age: 83
End: 2018-07-12

## 2018-07-12 NOTE — TELEPHONE ENCOUNTER
Contacted pt, both the pt and daughter, Janae Thomas were on the phone call. States pt was seen by the podiatrist today and it was noted that she has weeping from the left posterior leg with ankle swelling.  When asked if it left an indent her daughter checked an

## 2018-07-12 NOTE — TELEPHONE ENCOUNTER
MELY WENT TO THE FOOT DR TODAY, LEFT LEG IS [address], THEY WERE WONDERING ABOUT CHANGING DOSAGE OF THE LASIX?

## 2018-07-12 NOTE — TELEPHONE ENCOUNTER
Contacted pt and notified of Dr. Nicole Snow response.  Pt states she will call back tomorrow afternoon after seeing wound specialist.

## 2018-07-13 ENCOUNTER — TELEPHONE (OUTPATIENT)
Dept: FAMILY MEDICINE CLINIC | Facility: CLINIC | Age: 83
End: 2018-07-13

## 2018-07-13 NOTE — TELEPHONE ENCOUNTER
Grazynaantolin Gen called back-     Her leg is weeping.  She was at the podiatrist yesterday   She has a sore on right leg that they are treating   Her left leg is weeping- and advised to address w/ Dr Gallo Kumar is not alarmed about the weeping and confirmed t

## 2018-07-13 NOTE — TELEPHONE ENCOUNTER
----- Message from Andres Escalante sent at 7/13/2018 12:58 PM CDT -----  Contact: Dudley Moritz- daughter  Alex Chen- . [facility] [address] [facility]   [phone]

## 2018-07-16 NOTE — TELEPHONE ENCOUNTER
I agree with adding the furosemide. If 20 mg is not helping, increase to 40 mg. Should also be taking potassium 10 mEq daily while on the furosemide.

## 2018-07-17 ENCOUNTER — MED REC SCAN ONLY (OUTPATIENT)
Dept: FAMILY MEDICINE CLINIC | Facility: CLINIC | Age: 83
End: 2018-07-17

## 2018-07-17 ENCOUNTER — TELEPHONE (OUTPATIENT)
Dept: FAMILY MEDICINE CLINIC | Facility: CLINIC | Age: 83
End: 2018-07-17

## 2018-07-17 RX ORDER — FUROSEMIDE 20 MG/1
20 TABLET ORAL
Qty: 90 TABLET | Refills: 0 | Status: SHIPPED | OUTPATIENT
Start: 2018-07-17 | End: 2018-10-14

## 2018-07-19 RX ORDER — CARVEDILOL 6.25 MG/1
TABLET ORAL
Qty: 180 TABLET | Refills: 0 | Status: SHIPPED | OUTPATIENT
Start: 2018-07-19 | End: 2018-10-26

## 2018-08-07 ENCOUNTER — TELEPHONE (OUTPATIENT)
Dept: FAMILY MEDICINE CLINIC | Facility: CLINIC | Age: 83
End: 2018-08-07

## 2018-08-07 ENCOUNTER — LABORATORY ENCOUNTER (OUTPATIENT)
Dept: LAB | Age: 83
End: 2018-08-07
Attending: FAMILY MEDICINE
Payer: MEDICARE

## 2018-08-07 DIAGNOSIS — E11.9 DIABETES MELLITUS TYPE 2, DIET-CONTROLLED (HCC): Primary | ICD-10-CM

## 2018-08-07 LAB
CREAT UR-SCNC: 67.1 MG/DL
EST. AVERAGE GLUCOSE BLD GHB EST-MCNC: 117 MG/DL (ref 68–126)
HBA1C MFR BLD HPLC: 5.7 % (ref ?–5.7)
MICROALBUMIN UR-MCNC: 1.73 MG/DL
MICROALBUMIN/CREAT 24H UR-RTO: 25.8 UG/MG (ref ?–30)

## 2018-08-07 PROCEDURE — 82043 UR ALBUMIN QUANTITATIVE: CPT

## 2018-08-07 PROCEDURE — 83036 HEMOGLOBIN GLYCOSYLATED A1C: CPT

## 2018-08-07 PROCEDURE — 36415 COLL VENOUS BLD VENIPUNCTURE: CPT

## 2018-08-07 PROCEDURE — 82570 ASSAY OF URINE CREATININE: CPT

## 2018-08-10 RX ORDER — DRONEDARONE 400 MG/1
TABLET, FILM COATED ORAL
Qty: 60 TABLET | Status: SHIPPED | OUTPATIENT
Start: 2018-08-10 | End: 2019-05-31

## 2018-08-10 NOTE — TELEPHONE ENCOUNTER
Last office visit:  07/06/18  Last refill:  07/20/17   #60 with PRN refills      No future appointments.

## 2018-09-15 ENCOUNTER — TELEPHONE (OUTPATIENT)
Dept: FAMILY MEDICINE CLINIC | Facility: CLINIC | Age: 83
End: 2018-09-15

## 2018-09-15 NOTE — TELEPHONE ENCOUNTER
I called the patient and she was calling about her Oxygen. I do still have the letter from [facility] and they are needing office notes to submit to the insurance if she is going to continue to keep the oxygen. She would need to see Dr Faheem Olivera by 10/25/18.

## 2018-09-17 ENCOUNTER — TELEPHONE (OUTPATIENT)
Dept: FAMILY MEDICINE CLINIC | Facility: CLINIC | Age: 83
End: 2018-09-17

## 2018-09-17 NOTE — TELEPHONE ENCOUNTER
I spoke to the daughter    Future Appointments   Date Time Provider Sonal Ferguson   9/24/2018  2:00 PM Shani [facility], DO EM[facility] [place]

## 2018-09-17 NOTE — TELEPHONE ENCOUNTER
I called Lance Reyes and she advised that she was admitted for UTI, elevated HR    Future Appointments   Date Time Provider Sonal Ferguson   9/24/2018  2:00 PM Migel Ayala,  EM[facility] [place]

## 2018-09-24 ENCOUNTER — OFFICE VISIT (OUTPATIENT)
Dept: FAMILY MEDICINE CLINIC | Facility: CLINIC | Age: 83
End: 2018-09-24
Payer: MEDICARE

## 2018-09-24 ENCOUNTER — MED REC SCAN ONLY (OUTPATIENT)
Dept: FAMILY MEDICINE CLINIC | Facility: CLINIC | Age: 83
End: 2018-09-24

## 2018-09-24 VITALS
SYSTOLIC BLOOD PRESSURE: 150 MMHG | BODY MASS INDEX: 38.89 KG/M2 | WEIGHT: 206 LBS | DIASTOLIC BLOOD PRESSURE: 77 MMHG | HEART RATE: 72 BPM | HEIGHT: 61 IN | RESPIRATION RATE: 16 BRPM | TEMPERATURE: 99 F

## 2018-09-24 DIAGNOSIS — N39.0 RECURRENT UTI: Primary | ICD-10-CM

## 2018-09-24 DIAGNOSIS — J41.0 SIMPLE CHRONIC BRONCHITIS (HCC): ICD-10-CM

## 2018-09-24 DIAGNOSIS — J45.30 MILD PERSISTENT REACTIVE AIRWAY DISEASE WITHOUT COMPLICATION: ICD-10-CM

## 2018-09-24 DIAGNOSIS — I27.20 PULMONARY HYPERTENSION (HCC): ICD-10-CM

## 2018-09-24 DIAGNOSIS — E11.9 DIABETES MELLITUS TYPE 2, DIET-CONTROLLED (HCC): ICD-10-CM

## 2018-09-24 DIAGNOSIS — I48.20 CHRONIC ATRIAL FIBRILLATION (HCC): ICD-10-CM

## 2018-09-24 PROCEDURE — G0008 ADMIN INFLUENZA VIRUS VAC: HCPCS | Performed by: FAMILY MEDICINE

## 2018-09-24 PROCEDURE — 99214 OFFICE O/P EST MOD 30 MIN: CPT | Performed by: FAMILY MEDICINE

## 2018-09-24 PROCEDURE — 90653 IIV ADJUVANT VACCINE IM: CPT | Performed by: FAMILY MEDICINE

## 2018-09-24 RX ORDER — ESTRADIOL 0.1 MG/G
CREAM VAGINAL DAILY
COMMUNITY
End: 2019-02-20 | Stop reason: ALTCHOICE

## 2018-09-24 NOTE — PROGRESS NOTES
Nury Maguire is a 80year old female. Patient presents with: Other: check up , pt was [facility] and outpt 9/16 out 9/18dx htn,uti . pt states she is doing fine . inrm 2      HPI:   Patient was hospitalized with UTI and low blood pressure.   She is Disp:  Rfl:    Budesonide 1 MG/2ML Inhalation Suspension Take 2 mL (1 mg total) by nebulization 2 (two) times daily.  Mix with albuterol twice daily Disp: 120 mL Rfl: prn   Ferrous Sulfate 325 (65 Fe) MG Oral Tab Take 1 tablet (325 mg total) by mouth 2 (two Deanna Hernández MD;  Location: [address] [place] [address] MANAGEMENT  9/8/2014: Daniel VELA & Krupa Gaston NDL/JC SPI DX/THER Thomas Blue; N/A      Comment:  Procedure: LUMBAR EPIDURAL;  Surgeon: Deanna Hernández MD;  Location: [address] [place] [address] MANAGEMENT  8/27/2015: LUMBAR EPIDURAL; N/A      Comment:  Performed by Neetu Cleary MD at [address] [facility]  8/12/2015: LUMBAR EPIDURAL; N/A      Comment:  Performed by Neetu Cleary MD at [address] [place] [address] INFECTION PROPHYLAXIS.; N/A      Comment:  Procedure: LUMBAR EPIDURAL;  Surgeon: Jesús Diehl MD;  Location: [address] [place] [address] MANAGEMENT  8/12/2015: PATIENT [address] FOR IV ANTIBIOTIC   SURGICAL SITE INFECTION PROPHY 150/77 (BP Location: Right arm, Patient Position: Sitting, Cuff Size: large)   Pulse 72   Temp 98.7 °F (37.1 °C) (Temporal)   Resp 16   Ht 61\"   Wt 206 lb   BMI 38.92 kg/m²   GENERAL: well developed, well nourished,in no apparent distress  SKIN: no rashes Final   • Alt 07/06/2018 21  14 - 54 U/L Final   • Bilirubin, Total 07/06/2018 0.6  0.1 - 2.0 mg/dL Final   • Total Protein 07/06/2018 8.2  6.1 - 8.3 g/dL Final   • Albumin 07/06/2018 3.3* 3.5 - 4.8 g/dL Final   • Sodium 07/06/2018 140  136 - 144 mmol/L Fi Absolute 07/06/2018 0.02  0.00 - 1.00 x10(3) uL Final   • Neutrophil % 07/06/2018 66.0  % Final   • Lymphocyte % 07/06/2018 21.4  % Final   • Monocyte % 07/06/2018 8.9  % Final   • Eosinophil % 07/06/2018 3.1  % Final   • Basophil % 07/06/2018 0.3  % Final

## 2018-09-26 RX ORDER — LISINOPRIL 10 MG/1
TABLET ORAL
Qty: 90 TABLET | Refills: 0 | Status: SHIPPED | OUTPATIENT
Start: 2018-09-26 | End: 2018-12-21

## 2018-09-26 NOTE — TELEPHONE ENCOUNTER
Last office visit:  09/24/18  Last refill:  04/30/18   #90, no refills  Last cmp: 07/06/18  Last bp:  09/24/18   150/77

## 2018-10-15 RX ORDER — FUROSEMIDE 20 MG/1
TABLET ORAL
Qty: 90 TABLET | Refills: 0 | Status: SHIPPED | OUTPATIENT
Start: 2018-10-15 | End: 2019-02-25

## 2018-10-15 RX ORDER — POTASSIUM CHLORIDE 750 MG/1
TABLET, FILM COATED, EXTENDED RELEASE ORAL
Qty: 90 TABLET | Refills: 0 | Status: SHIPPED | OUTPATIENT
Start: 2018-10-15 | End: 2019-01-24

## 2018-10-15 NOTE — TELEPHONE ENCOUNTER
Furosemide 7/17/18 #90x0  Potassium 8/1/17 #90x0  Last ov 9/24/18  Last labs 8/7/18 re ck in 1 yr    No future appointments.

## 2018-10-26 RX ORDER — CARVEDILOL 6.25 MG/1
TABLET ORAL
Qty: 180 TABLET | Refills: 0 | Status: SHIPPED | OUTPATIENT
Start: 2018-10-26 | End: 2019-01-25

## 2018-10-26 NOTE — TELEPHONE ENCOUNTER
Last refill: 07/19/2018  Qty: 180  W/ 0 refills  Last ov: 09/24/2018    Last B/P reading was 150/77 on 09/24/2018    No future appointments.     Requested Prescriptions     Pending Prescriptions Disp Refills   • CARVEDILOL 6.25 MG Oral Tab [Pharmacy Med Nam

## 2018-11-13 ENCOUNTER — TELEPHONE (OUTPATIENT)
Dept: FAMILY MEDICINE CLINIC | Facility: CLINIC | Age: 83
End: 2018-11-13

## 2018-11-13 NOTE — TELEPHONE ENCOUNTER
PT. ASKING IF SHE SHOULD BE CALLING US FOR HER NEBULIZER SUPPLIES OR CALL DIRECTLY TO [address] [place] [address] [place].

## 2018-11-13 NOTE — TELEPHONE ENCOUNTER
I called the patient and she advised that she really needs to get new tubing for her nebulizer. It has been a long time that she has been using the same tubing.    I will look and see where we have ordered it in the past and I will let her know

## 2018-11-14 NOTE — TELEPHONE ENCOUNTER
Calling the patient to advise that we have never ordered tubing. I am not sure who supplied the nebulizer and she is not sure either. I will just place tubing up at the front counter for her to .

## 2018-11-19 RX ORDER — CARVEDILOL 6.25 MG/1
TABLET ORAL
Qty: 180 TABLET | Refills: 0 | OUTPATIENT
Start: 2018-11-19

## 2018-11-19 RX ORDER — FUROSEMIDE 20 MG/1
TABLET ORAL
Qty: 90 TABLET | Refills: 0 | OUTPATIENT
Start: 2018-11-19

## 2018-11-19 NOTE — TELEPHONE ENCOUNTER
Last office visit 9-24-18 150/77  Last refill Carvedilol 10-26-18 #180 Eddie Chou  Last refill Furosemide 10-15-18 #90 Eddie Chou

## 2018-11-21 RX ORDER — CARVEDILOL 6.25 MG/1
TABLET ORAL
Qty: 60 TABLET | Refills: 0 | OUTPATIENT
Start: 2018-11-21

## 2018-11-21 NOTE — TELEPHONE ENCOUNTER
Last office visit:  09/24/18  Last refill:  10/26/18  #180, no refills   Last cmp:  07/26/18  Last bp:  09/24/18   150/77      No future appointments.

## 2018-12-22 RX ORDER — LISINOPRIL 10 MG/1
TABLET ORAL
Qty: 90 TABLET | Refills: 0 | Status: SHIPPED | OUTPATIENT
Start: 2018-12-22 | End: 2019-04-05

## 2019-01-03 ENCOUNTER — OFFICE VISIT (OUTPATIENT)
Dept: FAMILY MEDICINE CLINIC | Facility: CLINIC | Age: 84
End: 2019-01-03
Payer: MEDICARE

## 2019-01-03 VITALS
DIASTOLIC BLOOD PRESSURE: 72 MMHG | HEART RATE: 76 BPM | TEMPERATURE: 98 F | BODY MASS INDEX: 39 KG/M2 | RESPIRATION RATE: 18 BRPM | OXYGEN SATURATION: 96 % | WEIGHT: 207 LBS | SYSTOLIC BLOOD PRESSURE: 122 MMHG

## 2019-01-03 DIAGNOSIS — J45.30 MILD PERSISTENT REACTIVE AIRWAY DISEASE WITHOUT COMPLICATION: Primary | ICD-10-CM

## 2019-01-03 PROCEDURE — 99213 OFFICE O/P EST LOW 20 MIN: CPT | Performed by: FAMILY MEDICINE

## 2019-01-03 RX ORDER — BUDESONIDE 1 MG/2ML
1 INHALANT ORAL 2 TIMES DAILY
Qty: 120 ML | Status: SHIPPED | OUTPATIENT
Start: 2019-01-03 | End: 2019-01-04

## 2019-01-03 RX ORDER — ALBUTEROL SULFATE 2.5 MG/3ML
2.5 SOLUTION RESPIRATORY (INHALATION) EVERY 4 HOURS PRN
Qty: 120 ML | Refills: 3 | Status: SHIPPED | OUTPATIENT
Start: 2019-01-03 | End: 2019-04-30

## 2019-01-03 RX ORDER — LEVOFLOXACIN 500 MG/1
500 TABLET, FILM COATED ORAL DAILY
Qty: 10 TABLET | Refills: 0 | Status: SHIPPED | OUTPATIENT
Start: 2019-01-03 | End: 2019-01-13

## 2019-01-03 NOTE — PROGRESS NOTES
Shelly Mabry is a 80year old female. Patient presents with:  Chest Congestion: started thursday room 1      HPI:   Patient complains of cough, congestion, wheezing. No fever chills or sweats. No sore throat or earache.   Symptoms of been going on for medications on file prior to visit.       Past Medical History:   Diagnosis Date   • Atrial fibrillation [facility])    • Benign positional vertigo    • Cerebral vascular accident, 1/8/2013, right occipital 1/23/2013   • Diabetes mellitus type 2, diet-controlled Former Smoker        Packs/day: 1.00        Years: 35.00        Pack years: 35        Types: Cigarettes        Quit date: 1987        Years since quittin.9      Smokeless tobacco: Never Used      Tobacco comment: 5 packs per week for 35 years.  Sree Liu Inhalation Nebu Soln 120 mL 3     Sig: Take 3 mL (2.5 mg total) by nebulization every 4 (four) hours as needed for Wheezing. • levofloxacin (LEVAQUIN) 500 MG Oral Tab 10 tablet 0     Sig: Take 1 tablet (500 mg total) by mouth daily for 10 days.        Mona Espino

## 2019-01-04 ENCOUNTER — TELEPHONE (OUTPATIENT)
Dept: FAMILY MEDICINE CLINIC | Facility: CLINIC | Age: 84
End: 2019-01-04

## 2019-01-04 RX ORDER — BUDESONIDE 1 MG/2ML
1 INHALANT ORAL DAILY
Qty: 60 ML | Refills: 0 | Status: SHIPPED | OUTPATIENT
Start: 2019-01-04 | End: 2019-01-08 | Stop reason: DRUGHIGH

## 2019-01-04 NOTE — TELEPHONE ENCOUNTER
I called the patient and advised. She was stating that her portion was going to be $1000 so she had not picked it up. I explained how Dr Justice Zimmer wants her to take it.    I did also advise that I will be sending a new script into the pharmacy

## 2019-01-04 NOTE — TELEPHONE ENCOUNTER
Thats messed up since it is a twice a day medicine. Have her use it twice a day even though we can change the prescription to once a day. After she has been on it for 5 days cut it back down to once a day.

## 2019-01-04 NOTE — TELEPHONE ENCOUNTER
Called to do prior auth   [phone]  ID# [id number]    On the Budesonide for the nebulizer we have it ordered as BID. The insurance will only allow once daily. If the patient needs it twice daily the patient will have to pay for the other half OOP.

## 2019-01-08 ENCOUNTER — TELEPHONE (OUTPATIENT)
Dept: FAMILY MEDICINE CLINIC | Facility: CLINIC | Age: 84
End: 2019-01-08

## 2019-01-08 RX ORDER — BUDESONIDE 0.5 MG/2ML
0.5 INHALANT ORAL DAILY
Qty: 60 ML | Refills: 0 | Status: SHIPPED | OUTPATIENT
Start: 2019-01-08 | End: 2019-01-12

## 2019-01-08 NOTE — TELEPHONE ENCOUNTER
The Budesonide 1mg/2mL is currently backordered  If the 0.5mg/2mL is available, per Dr Freeman De Leon, ok to fill that.     Sending a new script

## 2019-01-11 ENCOUNTER — TELEPHONE (OUTPATIENT)
Dept: FAMILY MEDICINE CLINIC | Facility: CLINIC | Age: 84
End: 2019-01-11

## 2019-01-11 NOTE — TELEPHONE ENCOUNTER
Calling Bogdan Blevins to advise that he did want her to use the albuterol solution QID     Left detailed message

## 2019-01-11 NOTE — TELEPHONE ENCOUNTER
NEBULIZER MEDICATION , [facility] HAS BEEN UNABLE TO FILL ONE OF THEM. DAUGHTER THINKS SHE SHOULD NOT BE USING ALBUTEROL SOLUTION 4X DAILY. IS AT [facility] NOW.    PLEASE ADVISE

## 2019-01-14 RX ORDER — BUDESONIDE 0.5 MG/2ML
INHALANT ORAL
Qty: 180 ML | Refills: 0 | Status: SHIPPED | OUTPATIENT
Start: 2019-01-14 | End: 2019-04-05

## 2019-01-24 ENCOUNTER — TELEPHONE (OUTPATIENT)
Dept: FAMILY MEDICINE CLINIC | Facility: CLINIC | Age: 84
End: 2019-01-24

## 2019-01-24 RX ORDER — POTASSIUM CHLORIDE 750 MG/1
TABLET, FILM COATED, EXTENDED RELEASE ORAL
Qty: 90 TABLET | Refills: 0 | Status: CANCELLED | OUTPATIENT
Start: 2019-01-24

## 2019-01-24 RX ORDER — POTASSIUM CHLORIDE 750 MG/1
10 TABLET, FILM COATED, EXTENDED RELEASE ORAL DAILY
Qty: 90 TABLET | Refills: 0 | Status: SHIPPED | OUTPATIENT
Start: 2019-01-24 | End: 2019-04-05

## 2019-01-24 NOTE — TELEPHONE ENCOUNTER
XARELTO 20 MG Oral Tab   POTASSIUM CHLORIDE ER 10 MEQ Oral Tab CR   Eddie Ruffin  Daughter is coming to town today and she really needs them she wanted to know if they can get called in ASAP.

## 2019-01-25 RX ORDER — CARVEDILOL 6.25 MG/1
TABLET ORAL
Qty: 180 TABLET | Refills: 0 | Status: SHIPPED | OUTPATIENT
Start: 2019-01-25 | End: 2019-04-05

## 2019-01-25 RX ORDER — LISINOPRIL 10 MG/1
TABLET ORAL
Qty: 90 TABLET | Refills: 0 | OUTPATIENT
Start: 2019-01-25

## 2019-01-25 RX ORDER — RIVAROXABAN 20 MG/1
TABLET, FILM COATED ORAL
Qty: 30 TABLET | Refills: 0 | OUTPATIENT
Start: 2019-01-25

## 2019-01-25 NOTE — TELEPHONE ENCOUNTER
Last office visit:  01/03/19     Last cmp:  07/06/18  Last bp:  01/03/19   122/72    CARVEDILOL:  10/26/18   #180, no refills   XARELTO:  Done yesterday-- not due  LISINOPRIL:  12/22/18   #90, no refills -- not due    No future appointments.

## 2019-02-19 ENCOUNTER — TELEPHONE (OUTPATIENT)
Dept: FAMILY MEDICINE CLINIC | Facility: CLINIC | Age: 84
End: 2019-02-19

## 2019-02-19 RX ORDER — BUDESONIDE 0.5 MG/2ML
INHALANT ORAL
Qty: 60 ML | Refills: 0 | Status: SHIPPED | OUTPATIENT
Start: 2019-02-19 | End: 2019-02-20

## 2019-02-19 RX ORDER — LEVOFLOXACIN 500 MG/1
500 TABLET, FILM COATED ORAL DAILY
Qty: 10 TABLET | Refills: 0 | Status: SHIPPED | OUTPATIENT
Start: 2019-02-19 | End: 2019-03-11 | Stop reason: ALTCHOICE

## 2019-02-19 NOTE — TELEPHONE ENCOUNTER
Last office visit: 1/03/2019    Last refill:  1/14/2019   Requested Prescriptions     Pending Prescriptions Disp Refills   • BUDESONIDE 0.5 MG/2ML Inhalation Suspension [Pharmacy Med Name: BUDESONIDE 0.5MG/2ML VIALS 30X2ML] 60 mL 0     Sig: USE 1 VIAL VIA

## 2019-02-20 ENCOUNTER — OFFICE VISIT (OUTPATIENT)
Dept: FAMILY MEDICINE CLINIC | Facility: CLINIC | Age: 84
End: 2019-02-20
Payer: MEDICARE

## 2019-02-20 VITALS
HEART RATE: 77 BPM | HEIGHT: 61 IN | SYSTOLIC BLOOD PRESSURE: 132 MMHG | DIASTOLIC BLOOD PRESSURE: 76 MMHG | WEIGHT: 214 LBS | BODY MASS INDEX: 40.4 KG/M2 | OXYGEN SATURATION: 94 %

## 2019-02-20 DIAGNOSIS — I50.9 CHRONIC CONGESTIVE HEART FAILURE, UNSPECIFIED HEART FAILURE TYPE (HCC): ICD-10-CM

## 2019-02-20 DIAGNOSIS — I48.20 CHRONIC ATRIAL FIBRILLATION (HCC): Primary | ICD-10-CM

## 2019-02-20 PROCEDURE — 99214 OFFICE O/P EST MOD 30 MIN: CPT | Performed by: FAMILY MEDICINE

## 2019-02-20 NOTE — PROGRESS NOTES
Dilma Us is a 80year old female. Patient presents with: Other: discuss shortness of breath and palpitations. ............. inrm 1       HPI:   Patient has a prior history of congestive heart failure. She also has a history of atrial fibrillation. mouth every morning before breakfast. Disp: 1 capsule Rfl: 0   Albuterol Sulfate HFA (PROAIR HFA) 108 (90 BASE) MCG/ACT Inhalation Aero Soln Inhale 2 puffs into the lungs every 6 (six) hours as needed.  Disp: 1 Inhaler Rfl: 0   [DISCONTINUED] BUDESONIDE 0.5 8/25/2014    Performed by Go Esquivel MD at [address]   • TOTAL ABDOM HYSTERECTOMY     • Harrietje 69  3/31/14    DR. WORKMAN     Family History   Problem Relation Age of Onset   • Cancer Father         Prostate   • Prosta fib which is triggering heart failure. Right now she is in a controlled rate. We will increase her furosemide from 20 mg to 40 mg daily. Restrict fluid intake. Recheck again in 5 days. If not improving will get a chest x-ray.   Also want her to be seen

## 2019-02-25 ENCOUNTER — OFFICE VISIT (OUTPATIENT)
Dept: FAMILY MEDICINE CLINIC | Facility: CLINIC | Age: 84
End: 2019-02-25
Payer: MEDICARE

## 2019-02-25 VITALS
HEART RATE: 80 BPM | DIASTOLIC BLOOD PRESSURE: 80 MMHG | BODY MASS INDEX: 40 KG/M2 | WEIGHT: 209.13 LBS | OXYGEN SATURATION: 95 % | SYSTOLIC BLOOD PRESSURE: 120 MMHG | TEMPERATURE: 98 F

## 2019-02-25 DIAGNOSIS — I50.9 CHRONIC CONGESTIVE HEART FAILURE, UNSPECIFIED HEART FAILURE TYPE (HCC): ICD-10-CM

## 2019-02-25 DIAGNOSIS — I10 ESSENTIAL HYPERTENSION: ICD-10-CM

## 2019-02-25 DIAGNOSIS — I48.20 CHRONIC ATRIAL FIBRILLATION (HCC): Primary | ICD-10-CM

## 2019-02-25 DIAGNOSIS — I27.20 PULMONARY HYPERTENSION (HCC): ICD-10-CM

## 2019-02-25 PROCEDURE — 99214 OFFICE O/P EST MOD 30 MIN: CPT | Performed by: FAMILY MEDICINE

## 2019-02-25 RX ORDER — FUROSEMIDE 20 MG/1
40 TABLET ORAL
Qty: 90 TABLET | Refills: 0 | COMMUNITY
Start: 2019-02-25 | End: 2019-03-11

## 2019-02-25 NOTE — PROGRESS NOTES
Dominick Blue        is a 80year old female. Patient presents with: Other: fup on sob, palpitations, edema. ... [facility] Man[facility] room 1      HPI:   Patient has lost 5 pounds since increasing the furosemide to 40 mg every morning. Her breathing is a little better.   She (PROAIR HFA) 108 (90 BASE) MCG/ACT Inhalation Aero Soln Inhale 2 puffs into the lungs every 6 (six) hours as needed.  Disp: 1 Inhaler Rfl: 0   [DISCONTINUED] FUROSEMIDE 20 MG Oral Tab TAKE 1 TABLET BY MOUTH DAILY Disp: 90 tablet Rfl: 0   Fluticasone Propion • UMBILICAL HERNIA REPAIR  3/31/14    DR. WORKMAN     Family History   Problem Relation Age of Onset   • Cancer Father         Prostate   • Prostate Cancer Father    • Ovarian Cancer Mother    • Heart Attack Brother    • Hypertension Brother    • Other (CA was spent counseling about diagnostic tests, results, further tests, treatment options, risks and benefits as well as prognosis and expectations. No orders of the defined types were placed in this encounter.       Meds & Refills for this Visit:  Reques

## 2019-03-11 ENCOUNTER — OFFICE VISIT (OUTPATIENT)
Dept: FAMILY MEDICINE CLINIC | Facility: CLINIC | Age: 84
End: 2019-03-11
Payer: MEDICARE

## 2019-03-11 ENCOUNTER — APPOINTMENT (OUTPATIENT)
Dept: LAB | Age: 84
End: 2019-03-11
Attending: FAMILY MEDICINE
Payer: MEDICARE

## 2019-03-11 VITALS
WEIGHT: 206.5 LBS | HEART RATE: 66 BPM | TEMPERATURE: 98 F | SYSTOLIC BLOOD PRESSURE: 122 MMHG | OXYGEN SATURATION: 98 % | BODY MASS INDEX: 39 KG/M2 | DIASTOLIC BLOOD PRESSURE: 72 MMHG

## 2019-03-11 DIAGNOSIS — E11.9 DIABETES MELLITUS TYPE 2, DIET-CONTROLLED (HCC): ICD-10-CM

## 2019-03-11 DIAGNOSIS — I50.9 CHRONIC CONGESTIVE HEART FAILURE, UNSPECIFIED HEART FAILURE TYPE (HCC): Primary | ICD-10-CM

## 2019-03-11 DIAGNOSIS — I48.20 CHRONIC ATRIAL FIBRILLATION (HCC): ICD-10-CM

## 2019-03-11 LAB
ANION GAP SERPL CALC-SCNC: 7 MMOL/L (ref 0–18)
BUN BLD-MCNC: 29 MG/DL (ref 7–18)
BUN/CREAT SERPL: 25.9 (ref 10–20)
CALCIUM BLD-MCNC: 9.5 MG/DL (ref 8.5–10.1)
CHLORIDE SERPL-SCNC: 106 MMOL/L (ref 98–107)
CO2 SERPL-SCNC: 29 MMOL/L (ref 21–32)
CREAT BLD-MCNC: 1.12 MG/DL (ref 0.55–1.02)
GLUCOSE BLD-MCNC: 92 MG/DL (ref 70–99)
OSMOLALITY SERPL CALC.SUM OF ELEC: 299 MOSM/KG (ref 275–295)
POTASSIUM SERPL-SCNC: 4.5 MMOL/L (ref 3.5–5.1)
SODIUM SERPL-SCNC: 142 MMOL/L (ref 136–145)

## 2019-03-11 PROCEDURE — 99214 OFFICE O/P EST MOD 30 MIN: CPT | Performed by: FAMILY MEDICINE

## 2019-03-11 PROCEDURE — 36415 COLL VENOUS BLD VENIPUNCTURE: CPT | Performed by: FAMILY MEDICINE

## 2019-03-11 PROCEDURE — 36415 COLL VENOUS BLD VENIPUNCTURE: CPT

## 2019-03-11 PROCEDURE — 80048 BASIC METABOLIC PNL TOTAL CA: CPT

## 2019-03-11 RX ORDER — FUROSEMIDE 20 MG/1
40 TABLET ORAL
Qty: 90 TABLET | Refills: 0 | Status: SHIPPED | OUTPATIENT
Start: 2019-03-11 | End: 2019-04-05

## 2019-03-11 RX ORDER — FUROSEMIDE 20 MG/1
TABLET ORAL
Qty: 180 TABLET | Refills: 0 | OUTPATIENT
Start: 2019-03-11

## 2019-03-11 NOTE — PROGRESS NOTES
Deon Davis is a 80year old female. Patient presents with: Other: 2 week check up. . room 2      HPI:   Patient feels her breathing is generally better. She still has episodes where her heart races. Her's weight is decreased another 3 pounds.   No c sprays by Nasal route daily. Disp: 1 Bottle Rfl: 3     No current facility-administered medications on file prior to visit.       Past Medical History:   Diagnosis Date   • Atrial fibrillation [facility])    • Benign positional vertigo    • Cerebral vascular acci BREAST CA        Social History:  Social History    Tobacco Use      Smoking status: Former Smoker        Packs/day: 1.00        Years: 35.00        Pack years: 35        Types: Cigarettes        Quit date: 1987        Years since quittin.1 Consults:  None

## 2019-03-22 RX ORDER — AMIODARONE HYDROCHLORIDE 200 MG/1
TABLET ORAL
COMMUNITY
End: 2020-03-30 | Stop reason: ALTCHOICE

## 2019-03-22 RX ORDER — FLUTICASONE PROPIONATE 50 MCG
SPRAY, SUSPENSION (ML) NASAL PRN
COMMUNITY

## 2019-03-22 RX ORDER — OMEPRAZOLE 20 MG/1
CAPSULE, DELAYED RELEASE ORAL
COMMUNITY

## 2019-03-22 RX ORDER — CARVEDILOL 6.25 MG/1
TABLET ORAL
COMMUNITY
End: 2020-03-06 | Stop reason: DRUGHIGH

## 2019-03-22 RX ORDER — FUROSEMIDE 40 MG/1
TABLET ORAL
COMMUNITY

## 2019-03-22 RX ORDER — LISINOPRIL 10 MG/1
TABLET ORAL
COMMUNITY

## 2019-03-22 RX ORDER — WARFARIN SODIUM 5 MG/1
TABLET ORAL
COMMUNITY
End: 2019-04-19 | Stop reason: ALTCHOICE

## 2019-03-22 RX ORDER — CEPHALEXIN 500 MG/1
TABLET ORAL
COMMUNITY
End: 2020-03-05 | Stop reason: ALTCHOICE

## 2019-03-22 RX ORDER — POTASSIUM CHLORIDE 750 MG/1
TABLET, FILM COATED, EXTENDED RELEASE ORAL
COMMUNITY

## 2019-04-05 ENCOUNTER — OFFICE VISIT (OUTPATIENT)
Dept: FAMILY MEDICINE CLINIC | Facility: CLINIC | Age: 84
End: 2019-04-05
Payer: MEDICARE

## 2019-04-05 VITALS
BODY MASS INDEX: 38 KG/M2 | SYSTOLIC BLOOD PRESSURE: 114 MMHG | HEART RATE: 68 BPM | DIASTOLIC BLOOD PRESSURE: 68 MMHG | WEIGHT: 202.13 LBS | TEMPERATURE: 99 F | OXYGEN SATURATION: 99 %

## 2019-04-05 DIAGNOSIS — I50.9 CHRONIC CONGESTIVE HEART FAILURE, UNSPECIFIED HEART FAILURE TYPE (HCC): Primary | ICD-10-CM

## 2019-04-05 DIAGNOSIS — I10 ESSENTIAL HYPERTENSION: ICD-10-CM

## 2019-04-05 DIAGNOSIS — N39.0 RECURRENT UTI: ICD-10-CM

## 2019-04-05 DIAGNOSIS — I48.20 CHRONIC ATRIAL FIBRILLATION (HCC): ICD-10-CM

## 2019-04-05 PROCEDURE — 99213 OFFICE O/P EST LOW 20 MIN: CPT | Performed by: FAMILY MEDICINE

## 2019-04-05 RX ORDER — LISINOPRIL 10 MG/1
TABLET ORAL
Qty: 90 TABLET | Refills: 1 | Status: SHIPPED | OUTPATIENT
Start: 2019-04-05 | End: 2020-01-21

## 2019-04-05 RX ORDER — CEPHALEXIN 500 MG/1
500 CAPSULE ORAL 3 TIMES DAILY
Qty: 30 CAPSULE | Refills: 0 | Status: SHIPPED | OUTPATIENT
Start: 2019-04-05 | End: 2019-05-29

## 2019-04-05 RX ORDER — CARVEDILOL 6.25 MG/1
6.25 TABLET ORAL 2 TIMES DAILY
Qty: 180 TABLET | Refills: 1 | Status: SHIPPED | OUTPATIENT
Start: 2019-04-05 | End: 2019-09-06

## 2019-04-05 RX ORDER — POTASSIUM CHLORIDE 750 MG/1
10 TABLET, FILM COATED, EXTENDED RELEASE ORAL DAILY
Qty: 90 TABLET | Refills: 1 | Status: SHIPPED | OUTPATIENT
Start: 2019-04-05 | End: 2019-10-16

## 2019-04-05 RX ORDER — BUDESONIDE 0.5 MG/2ML
INHALANT ORAL
Qty: 180 ML | Refills: 1 | Status: SHIPPED | OUTPATIENT
Start: 2019-04-05 | End: 2019-04-19

## 2019-04-05 RX ORDER — FUROSEMIDE 20 MG/1
40 TABLET ORAL
Qty: 180 TABLET | Refills: 1 | Status: SHIPPED | OUTPATIENT
Start: 2019-04-05 | End: 2020-02-07

## 2019-04-05 NOTE — PROGRESS NOTES
Ana Lilia Cooper is a 80year old female. Patient presents with: Other: follow up on COPD and medications, possible labs. . room 1      HPI:   Asher is generally doing well. Her breathing has been stable. No change in her edema.   She still has occasional p breakfast. Disp: 1 capsule Rfl: 0   Albuterol Sulfate HFA (PROAIR HFA) 108 (90 BASE) MCG/ACT Inhalation Aero Soln Inhale 2 puffs into the lungs every 6 (six) hours as needed.  Disp: 1 Inhaler Rfl: 0   Fluticasone Propionate (FLONASE) 50 MCG/ACT Nasal Suspen DR. Sharath Almaguer     Family History   Problem Relation Age of Onset   • Cancer Father         Prostate   • Prostate Cancer Father    • Ovarian Cancer Mother    • Heart Attack Brother    • Hypertension Brother    • Other (CAD) Brother    • Cancer Daughter 44 missed! No orders of the defined types were placed in this encounter.       Meds & Refills for this Visit:  Requested Prescriptions     Signed Prescriptions Disp Refills   • lisinopril 10 MG Oral Tab 90 tablet 1     Sig: TAKE 1 TABLET BY MOUTH EVERY Dulce Maria Pont

## 2019-04-18 SDOH — HEALTH STABILITY: MENTAL HEALTH: HOW OFTEN DO YOU HAVE A DRINK CONTAINING ALCOHOL?: NEVER

## 2019-04-19 ENCOUNTER — TELEPHONE (OUTPATIENT)
Dept: FAMILY MEDICINE CLINIC | Facility: CLINIC | Age: 84
End: 2019-04-19

## 2019-04-19 ENCOUNTER — OFFICE VISIT (OUTPATIENT)
Dept: CARDIOLOGY | Age: 84
End: 2019-04-19

## 2019-04-19 VITALS
WEIGHT: 207 LBS | SYSTOLIC BLOOD PRESSURE: 134 MMHG | BODY MASS INDEX: 38.09 KG/M2 | DIASTOLIC BLOOD PRESSURE: 82 MMHG | HEART RATE: 80 BPM | HEIGHT: 62 IN

## 2019-04-19 DIAGNOSIS — I48.0 PAROXYSMAL ATRIAL FIBRILLATION (CMD): ICD-10-CM

## 2019-04-19 DIAGNOSIS — I10 ESSENTIAL HYPERTENSION: Primary | ICD-10-CM

## 2019-04-19 DIAGNOSIS — I50.22 CHRONIC SYSTOLIC CONGESTIVE HEART FAILURE (CMD): ICD-10-CM

## 2019-04-19 PROBLEM — I48.91 ATRIAL FIBRILLATION (CMD): Status: ACTIVE | Noted: 2019-04-19

## 2019-04-19 PROCEDURE — 99214 OFFICE O/P EST MOD 30 MIN: CPT | Performed by: INTERNAL MEDICINE

## 2019-04-19 RX ORDER — OCTISALATE, AVOBENZONE, HOMOSALATE, AND OCTOCRYLENE 29.4; 29.4; 49; 25.48 MG/ML; MG/ML; MG/ML; MG/ML
1 LOTION TOPICAL DAILY
COMMUNITY

## 2019-04-19 RX ORDER — LORATADINE 10 MG/1
10 TABLET ORAL DAILY
COMMUNITY

## 2019-04-19 RX ORDER — FERROUS SULFATE 325(65) MG
325 TABLET ORAL
COMMUNITY

## 2019-04-19 RX ORDER — BUDESONIDE 0.5 MG/2ML
INHALANT ORAL
Qty: 180 ML | Refills: 1 | Status: SHIPPED | OUTPATIENT
Start: 2019-04-19 | End: 2019-04-20

## 2019-04-19 ASSESSMENT — ENCOUNTER SYMPTOMS
WEIGHT LOSS: 0
SUSPICIOUS LESIONS: 0
HEMATOCHEZIA: 0
WEIGHT GAIN: 0
BRUISES/BLEEDS EASILY: 0
ALLERGIC/IMMUNOLOGIC COMMENTS: NO NEW FOOD ALLERGIES
CHILLS: 0
FEVER: 0
COUGH: 0
BACK PAIN: 1
HEMOPTYSIS: 0

## 2019-04-19 NOTE — TELEPHONE ENCOUNTER
budesonide 0.5 MG/2ML Inhalation Suspension   PER ABRIL, SHE CALLED THE PHARMACY AND SHE WAS TOLD THE PAPERWORK WASN'T FILLED OUT. PLEASE SEND REFILL TO [facility] [place].

## 2019-04-20 ENCOUNTER — TELEPHONE (OUTPATIENT)
Dept: FAMILY MEDICINE CLINIC | Facility: CLINIC | Age: 84
End: 2019-04-20

## 2019-04-20 RX ORDER — FLUTICASONE PROPIONATE AND SALMETEROL 250; 50 UG/1; UG/1
1 POWDER RESPIRATORY (INHALATION) EVERY 12 HOURS SCHEDULED
Qty: 1 PACKAGE | Refills: 11 | Status: SHIPPED | OUTPATIENT
Start: 2019-04-20 | End: 2019-07-12

## 2019-04-20 NOTE — TELEPHONE ENCOUNTER
Medicare is not covering Budesonide, it is over a $1000.00 to fill, is there anything else she can use.  Call Paulie Candelario

## 2019-04-22 ENCOUNTER — TELEPHONE (OUTPATIENT)
Dept: FAMILY MEDICINE CLINIC | Facility: CLINIC | Age: 84
End: 2019-04-22

## 2019-04-22 DIAGNOSIS — J44.9 CHRONIC OBSTRUCTIVE PULMONARY DISEASE, UNSPECIFIED COPD TYPE (HCC): Primary | ICD-10-CM

## 2019-04-22 NOTE — TELEPHONE ENCOUNTER
I will have  sign the order and [facility]   Fax# given [phone]

## 2019-04-22 NOTE — TELEPHONE ENCOUNTER
[employer] no longer wants the oxygen so daughter called [facility] and they need a order from Dr. Sierra Matt to discontinue oxygen,

## 2019-04-23 RX ORDER — CEPHALEXIN 500 MG/1
CAPSULE ORAL
Qty: 30 CAPSULE | Refills: 0 | OUTPATIENT
Start: 2019-04-23

## 2019-04-23 RX ORDER — POTASSIUM CHLORIDE 750 MG/1
TABLET, FILM COATED, EXTENDED RELEASE ORAL
Qty: 90 TABLET | Refills: 0 | OUTPATIENT
Start: 2019-04-23

## 2019-04-30 ENCOUNTER — TELEPHONE (OUTPATIENT)
Dept: FAMILY MEDICINE CLINIC | Facility: CLINIC | Age: 84
End: 2019-04-30

## 2019-04-30 RX ORDER — ALBUTEROL SULFATE 2.5 MG/3ML
2.5 SOLUTION RESPIRATORY (INHALATION) EVERY 4 HOURS PRN
Qty: 120 ML | Refills: 3 | Status: SHIPPED | OUTPATIENT
Start: 2019-04-30 | End: 2020-06-05

## 2019-04-30 RX ORDER — ALBUTEROL SULFATE 90 UG/1
2 AEROSOL, METERED RESPIRATORY (INHALATION) EVERY 6 HOURS PRN
Qty: 1 INHALER | Refills: 0 | Status: SHIPPED | OUTPATIENT
Start: 2019-04-30 | End: 2020-05-28

## 2019-04-30 NOTE — TELEPHONE ENCOUNTER
Fax# [phone]. albuteral sulfate inhaler and albuteral for the neb machine. This is a new pharmacy for [facility].

## 2019-05-29 ENCOUNTER — TELEPHONE (OUTPATIENT)
Dept: FAMILY MEDICINE CLINIC | Facility: CLINIC | Age: 84
End: 2019-05-29

## 2019-05-29 RX ORDER — CEPHALEXIN 500 MG/1
CAPSULE ORAL
Qty: 30 CAPSULE | Refills: 0 | Status: SHIPPED | OUTPATIENT
Start: 2019-05-29 | End: 2019-10-07

## 2019-06-24 ENCOUNTER — TELEPHONE (OUTPATIENT)
Dept: FAMILY MEDICINE CLINIC | Facility: CLINIC | Age: 84
End: 2019-06-24

## 2019-06-24 NOTE — TELEPHONE ENCOUNTER
Mom is going to have a tooth pulled and daughter is wanting to know about her meds and should she stop taking any of them before the procedure.

## 2019-06-25 NOTE — TELEPHONE ENCOUNTER
I spoke to Jeovany Vicente and advised. She advised that they contacted the 2 physicians up in [place] that Dr Elio Pineda had recommended. One is not accepting patients and one does not have openings until October.  She is scheduled here for her Wellness in

## 2019-07-12 ENCOUNTER — OFFICE VISIT (OUTPATIENT)
Dept: FAMILY MEDICINE CLINIC | Facility: CLINIC | Age: 84
End: 2019-07-12
Payer: MEDICARE

## 2019-07-12 VITALS
WEIGHT: 202.5 LBS | HEIGHT: 61.25 IN | BODY MASS INDEX: 37.74 KG/M2 | DIASTOLIC BLOOD PRESSURE: 72 MMHG | HEART RATE: 74 BPM | TEMPERATURE: 97 F | OXYGEN SATURATION: 95 % | SYSTOLIC BLOOD PRESSURE: 118 MMHG

## 2019-07-12 DIAGNOSIS — I48.20 CHRONIC ATRIAL FIBRILLATION (HCC): ICD-10-CM

## 2019-07-12 DIAGNOSIS — I10 ESSENTIAL HYPERTENSION: ICD-10-CM

## 2019-07-12 DIAGNOSIS — I50.9 CHRONIC CONGESTIVE HEART FAILURE, UNSPECIFIED HEART FAILURE TYPE (HCC): ICD-10-CM

## 2019-07-12 DIAGNOSIS — Z00.00 ENCOUNTER FOR ANNUAL HEALTH EXAMINATION: Primary | ICD-10-CM

## 2019-07-12 DIAGNOSIS — E11.9 DIABETES MELLITUS TYPE 2, DIET-CONTROLLED (HCC): ICD-10-CM

## 2019-07-12 DIAGNOSIS — N39.0 RECURRENT UTI: ICD-10-CM

## 2019-07-12 DIAGNOSIS — Z13.31 DEPRESSION SCREENING: ICD-10-CM

## 2019-07-12 PROCEDURE — G0444 DEPRESSION SCREEN ANNUAL: HCPCS | Performed by: FAMILY MEDICINE

## 2019-07-12 PROCEDURE — G0439 PPPS, SUBSEQ VISIT: HCPCS | Performed by: FAMILY MEDICINE

## 2019-07-12 RX ORDER — CEPHALEXIN 500 MG/1
500 CAPSULE ORAL 3 TIMES DAILY
Qty: 30 CAPSULE | Refills: 0 | Status: SHIPPED | OUTPATIENT
Start: 2019-07-12 | End: 2019-07-31

## 2019-07-12 RX ORDER — FLUTICASONE PROPIONATE AND SALMETEROL 250; 50 UG/1; UG/1
1 POWDER RESPIRATORY (INHALATION) EVERY 12 HOURS SCHEDULED
Qty: 1 PACKAGE | Refills: 11 | Status: SHIPPED | OUTPATIENT
Start: 2019-07-12 | End: 2020-07-23

## 2019-07-12 RX ORDER — ZOSTER VACCINE RECOMBINANT, ADJUVANTED 50 MCG/0.5
KIT INTRAMUSCULAR
Refills: 0 | COMMUNITY
Start: 2019-03-16 | End: 2019-07-12 | Stop reason: ALTCHOICE

## 2019-07-12 NOTE — PROGRESS NOTES
HPI:   Jake Kaur is a 80year old female who presents for a Medicare Subsequent Annual Wellness visit (Pt already had Initial Annual Wellness).     No complaints          Fall/Risk Assessment   She has been screened for Falls and is low risk: Fall/R 5 packs per week for 35 years. Quit 1987         CAGE Alcohol screening   Pooja Rutledge was screened for Alcohol abuse and had a score of 0 so is at low risk.     Patient Care Team: Patient Care Team:  Shani Colin DO as PCP - General (Family Pract lungs every 12 (twelve) hours. Dx: COPD   lisinopril 10 MG Oral Tab TAKE 1 TABLET BY MOUTH EVERY EVENING   furosemide 20 MG Oral Tab Take 2 tablets (40 mg total) by mouth once daily.    carvedilol 6.25 MG Oral Tab Take 1 tablet (6.25 mg total) by mouth 2 (t infection prophylaxis.  (N/A, 8/25/2014); patient documented not to have experienced any of the following events (N/A, 8/25/2014); laparoscopic incisional / umbilical / ventral hernia repair (9/10/2014); injection, w/wo contrast, dx/therapeutic substance, e drink alcohol or use drugs.      REVIEW OF SYSTEMS:        EXAM:   /72   Pulse 74   Temp 97.4 °F (36.3 °C) (Tympanic)   Ht 61.25\"   Wt 202 lb 8 oz   SpO2 95%   BMI 37.95 kg/m²  Estimated body mass index is 37.95 kg/m² as calculated from the following failure, unspecified heart failure type ([facility] [address]    Recurrent UTI         Diet assessment: good     PLAN:  The patient indicates understanding of these issues and agrees to the plan. Reinforced healthy diet, lifestyle, and exercise. No follow-ups on file. Dilated Eye Exam 4/16/2014       Bone Density Screening      Dexascan Every two years No results found for this or any previous visit. No flowsheet data found.     Pap and Pelvic      Pap: Every 3 yrs age 21-68 or Pap+HPV every 5 yrs age 33-67, age 72 and o POTASSIUM (mmol/L)   Date Value   01/20/2014 4.1    No flowsheet data found. Creatinine  Annually CREATININE (mg/dL)   Date Value   01/20/2014 0.76     Creatinine (mg/dL)   Date Value   03/11/2019 1.12 (H)    No flowsheet data found.     Drug Serum C

## 2019-07-12 NOTE — PATIENT INSTRUCTIONS
Kaitlyn Chatterjee's SCREENING SCHEDULE   Tests on this list are recommended by your physician but may not be covered, or covered at this frequency, by your insurer. Please check with your insurance carrier before scheduling to verify coverage.    PREVENTATI patients who meet one of the following criteria:   • Men who are 73-68 years old and have smoked more than 100 cigarettes in their lifetime   • Anyone with a family history    Colorectal Cancer Screening  Covered up to Age 76     Colonoscopy Screen   Cover get this Mammogram regularly   Immunizations      Influenza  Covered Annually No orders found for this or any previous visit.  Please get every year    Pneumococcal 13 (Prevnar)  Covered Once after 65 Orders placed or performed in visit on 06/09/16   • MISAEL link also has information from the [address] [place] [address] regarding Advance Directives.

## 2019-07-23 RX ORDER — CARVEDILOL 6.25 MG/1
TABLET ORAL
Qty: 180 TABLET | Refills: 0 | OUTPATIENT
Start: 2019-07-23

## 2019-07-31 ENCOUNTER — OFFICE VISIT (OUTPATIENT)
Dept: FAMILY MEDICINE CLINIC | Facility: CLINIC | Age: 84
End: 2019-07-31
Payer: MEDICARE

## 2019-07-31 VITALS
OXYGEN SATURATION: 94 % | TEMPERATURE: 98 F | HEART RATE: 72 BPM | DIASTOLIC BLOOD PRESSURE: 72 MMHG | SYSTOLIC BLOOD PRESSURE: 120 MMHG

## 2019-07-31 DIAGNOSIS — K11.20 SIALADENITIS: Primary | ICD-10-CM

## 2019-07-31 PROCEDURE — 99213 OFFICE O/P EST LOW 20 MIN: CPT | Performed by: FAMILY MEDICINE

## 2019-07-31 RX ORDER — CEFUROXIME AXETIL 250 MG/1
250 TABLET ORAL 2 TIMES DAILY
Qty: 20 TABLET | Refills: 0 | Status: SHIPPED | OUTPATIENT
Start: 2019-07-31 | End: 2019-10-07

## 2019-07-31 NOTE — PROGRESS NOTES
Nury Maguire is a 80year old female. Patient presents with:  Lump: lump on LT side of neck-very red, warm, hard lump-has had this back in 2016. ...room 2      HPI:   In March 2016, patient developed a left parotitis. She went to the ER.   CT scan did n Cap Take 1 capsule by mouth daily. Disp:  Rfl:    Ferrous Sulfate 325 (65 Fe) MG Oral Tab Take 325 mg by mouth daily with breakfast.   Disp: 1 tablet Rfl: 0   loratadine (CLARITIN) 10 MG Oral Tab Take 1 tablet (10 mg total) by mouth daily.  Disp: 1 tablet R 9/8/2014    Performed by Kika Gilliland MD at [address] [facility] 8/25/2014    Performed by Kika Gilliland MD at [facility]   • TOTAL ABDOM HYSTERECTOMY     • Marco Antonio 69  3/31/14     this Visit:  Requested Prescriptions     Signed Prescriptions Disp Refills   • Cefuroxime Axetil 250 MG Oral Tab 20 tablet 0     Sig: Take 1 tablet (250 mg total) by mouth 2 (two) times daily.        Imaging & Consults:  ENT - EXTERNAL

## 2019-08-02 ENCOUNTER — HOSPITAL ENCOUNTER (OUTPATIENT)
Dept: CT IMAGING | Facility: HOSPITAL | Age: 84
Discharge: HOME | End: 2019-08-02
Attending: OTOLARYNGOLOGY
Payer: MEDICARE

## 2019-08-02 ENCOUNTER — LAB ENCOUNTER (OUTPATIENT)
Dept: LAB | Facility: HOSPITAL | Age: 84
End: 2019-08-02
Attending: OTOLARYNGOLOGY
Payer: MEDICARE

## 2019-08-02 DIAGNOSIS — K11.21 ACUTE PAROTITIS: ICD-10-CM

## 2019-08-02 LAB
ALBUMIN SERPL-MCNC: 3.1 G/DL (ref 3.4–5)
ALBUMIN/GLOB SERPL: 0.7 {RATIO} (ref 1–2)
ALP LIVER SERPL-CCNC: 101 U/L (ref 55–142)
ALT SERPL-CCNC: 16 U/L (ref 13–56)
AMYLASE SERPL-CCNC: 38 U/L (ref 25–115)
ANION GAP SERPL CALC-SCNC: 4 MMOL/L (ref 0–18)
AST SERPL-CCNC: 20 U/L (ref 15–37)
BASOPHILS # BLD AUTO: 0.03 X10(3) UL (ref 0–0.2)
BASOPHILS NFR BLD AUTO: 0.4 %
BILIRUB SERPL-MCNC: 0.4 MG/DL (ref 0.1–2)
BUN BLD-MCNC: 23 MG/DL (ref 7–18)
BUN/CREAT SERPL: 20.5 (ref 10–20)
CALCIUM BLD-MCNC: 9.5 MG/DL (ref 8.5–10.1)
CHLORIDE SERPL-SCNC: 108 MMOL/L (ref 98–112)
CO2 SERPL-SCNC: 28 MMOL/L (ref 21–32)
CREAT BLD-MCNC: 1.12 MG/DL (ref 0.55–1.02)
DEPRECATED RDW RBC AUTO: 48.8 FL (ref 35.1–46.3)
EOSINOPHIL # BLD AUTO: 0.46 X10(3) UL (ref 0–0.7)
EOSINOPHIL NFR BLD AUTO: 5.8 %
ERYTHROCYTE [DISTWIDTH] IN BLOOD BY AUTOMATED COUNT: 13.1 % (ref 11–15)
GLOBULIN PLAS-MCNC: 4.3 G/DL (ref 2.8–4.4)
GLUCOSE BLD-MCNC: 73 MG/DL (ref 70–99)
HCT VFR BLD AUTO: 39.8 % (ref 35–48)
HGB BLD-MCNC: 13.2 G/DL (ref 12–16)
IMM GRANULOCYTES # BLD AUTO: 0.03 X10(3) UL (ref 0–1)
IMM GRANULOCYTES NFR BLD: 0.4 %
LYMPHOCYTES # BLD AUTO: 1.61 X10(3) UL (ref 1–4)
LYMPHOCYTES NFR BLD AUTO: 20.4 %
M PROTEIN MFR SERPL ELPH: 7.4 G/DL (ref 6.4–8.2)
MCH RBC QN AUTO: 33.5 PG (ref 26–34)
MCHC RBC AUTO-ENTMCNC: 33.2 G/DL (ref 31–37)
MCV RBC AUTO: 101 FL (ref 80–100)
MONOCYTES # BLD AUTO: 0.79 X10(3) UL (ref 0.1–1)
MONOCYTES NFR BLD AUTO: 10 %
NEUTROPHILS # BLD AUTO: 4.98 X10 (3) UL (ref 1.5–7.7)
NEUTROPHILS # BLD AUTO: 4.98 X10(3) UL (ref 1.5–7.7)
NEUTROPHILS NFR BLD AUTO: 63 %
OSMOLALITY SERPL CALC.SUM OF ELEC: 292 MOSM/KG (ref 275–295)
PLATELET # BLD AUTO: 205 10(3)UL (ref 150–450)
POTASSIUM SERPL-SCNC: 4.4 MMOL/L (ref 3.5–5.1)
RBC # BLD AUTO: 3.94 X10(6)UL (ref 3.8–5.3)
SODIUM SERPL-SCNC: 140 MMOL/L (ref 136–145)
WBC # BLD AUTO: 7.9 X10(3) UL (ref 4–11)

## 2019-08-02 PROCEDURE — 80053 COMPREHEN METABOLIC PANEL: CPT

## 2019-08-02 PROCEDURE — 83516 IMMUNOASSAY NONANTIBODY: CPT

## 2019-08-02 PROCEDURE — 86038 ANTINUCLEAR ANTIBODIES: CPT

## 2019-08-02 PROCEDURE — 36415 COLL VENOUS BLD VENIPUNCTURE: CPT

## 2019-08-02 PROCEDURE — 86235 NUCLEAR ANTIGEN ANTIBODY: CPT

## 2019-08-02 PROCEDURE — 86225 DNA ANTIBODY NATIVE: CPT

## 2019-08-02 PROCEDURE — 70491 CT SOFT TISSUE NECK W/DYE: CPT | Performed by: OTOLARYNGOLOGY

## 2019-08-02 PROCEDURE — 85025 COMPLETE CBC W/AUTO DIFF WBC: CPT

## 2019-08-02 PROCEDURE — 82150 ASSAY OF AMYLASE: CPT

## 2019-08-05 LAB
ANTI-NUCLEAR ANTIBODY (ANA), HEP-2 IGG: DETECTED
CHROMATIN ANTIBODY,IGG: 8 UNITS
RIBONUCLEIC PROTEIN (U1) (ENA) AB, IGG: 4 AU/ML
SCLERODERMA (SCL-70) (ENA) AB, IGG: 3 AU/ML
SMITH (ENA) ANTIBODY, IGG: 1 AU/ML
SSA 52 (RO) (ENA) ANTIBODY, IGG: 3 AU/ML
SSA 60 (RO) (ENA) ANTIBODY, IGG: 3 AU/ML
SSB (LA) (ENA) ANTIBODY, IGG: 2 AU/ML

## 2019-08-07 ENCOUNTER — MED REC SCAN ONLY (OUTPATIENT)
Dept: FAMILY MEDICINE CLINIC | Facility: CLINIC | Age: 84
End: 2019-08-07

## 2019-09-06 RX ORDER — CARVEDILOL 6.25 MG/1
TABLET ORAL
Qty: 180 TABLET | Refills: 10 | Status: SHIPPED | OUTPATIENT
Start: 2019-09-06 | End: 2020-01-07

## 2019-09-11 ENCOUNTER — MED REC SCAN ONLY (OUTPATIENT)
Dept: FAMILY MEDICINE CLINIC | Facility: CLINIC | Age: 84
End: 2019-09-11

## 2019-10-07 ENCOUNTER — OFFICE VISIT (OUTPATIENT)
Dept: INTERNAL MEDICINE CLINIC | Facility: CLINIC | Age: 84
End: 2019-10-07
Payer: MEDICARE

## 2019-10-07 VITALS
SYSTOLIC BLOOD PRESSURE: 120 MMHG | HEIGHT: 62 IN | RESPIRATION RATE: 16 BRPM | TEMPERATURE: 98 F | DIASTOLIC BLOOD PRESSURE: 76 MMHG | HEART RATE: 76 BPM | WEIGHT: 206.63 LBS | BODY MASS INDEX: 38.03 KG/M2

## 2019-10-07 DIAGNOSIS — R73.03 PRE-DIABETES: ICD-10-CM

## 2019-10-07 DIAGNOSIS — N39.0 RECURRENT UTI: ICD-10-CM

## 2019-10-07 DIAGNOSIS — Z13.220 SCREENING CHOLESTEROL LEVEL: ICD-10-CM

## 2019-10-07 DIAGNOSIS — K11.8 PAROTID MASS: ICD-10-CM

## 2019-10-07 DIAGNOSIS — I10 ESSENTIAL HYPERTENSION: Primary | ICD-10-CM

## 2019-10-07 DIAGNOSIS — I48.0 PAROXYSMAL ATRIAL FIBRILLATION (HCC): ICD-10-CM

## 2019-10-07 DIAGNOSIS — I50.9 CHRONIC CONGESTIVE HEART FAILURE, UNSPECIFIED HEART FAILURE TYPE (HCC): ICD-10-CM

## 2019-10-07 PROCEDURE — G0008 ADMIN INFLUENZA VIRUS VAC: HCPCS | Performed by: INTERNAL MEDICINE

## 2019-10-07 PROCEDURE — 90662 IIV NO PRSV INCREASED AG IM: CPT | Performed by: INTERNAL MEDICINE

## 2019-10-07 PROCEDURE — 99204 OFFICE O/P NEW MOD 45 MIN: CPT | Performed by: INTERNAL MEDICINE

## 2019-10-07 RX ORDER — CEPHALEXIN 500 MG/1
CAPSULE ORAL
Qty: 30 CAPSULE | Refills: 0 | Status: SHIPPED | OUTPATIENT
Start: 2019-10-07 | End: 2020-01-03

## 2019-10-07 NOTE — PROGRESS NOTES
Patient presents with:  New Patient: cn room 9: new patient here to establish care       HPI:  Here to establish care. Has stable htn, pafib, chf and pre diabetes. All stable taking meds no se's. Pt has been having a parotid mass investigated with ent. Soln Take 3 mL (2.5 mg total) by nebulization every 4 (four) hours as needed for Wheezing. Disp: 120 mL Rfl: 3   Albuterol Sulfate HFA (PROAIR HFA) 108 (90 Base) MCG/ACT Inhalation Aero Soln Inhale 2 puffs into the lungs every 6 (six) hours as needed.  Disp distress. Abdominal: Soft. Bowel sounds are normal. Non tender, no masses, no organomegaly or hernias. Musculoskeletal: No edema  Lymphadenopathy: No cervical adenopathy. Neurological: No defecits  Skin: Skin is warm and dry. No rash.   Psychiatric: Nor

## 2019-10-16 RX ORDER — POTASSIUM CHLORIDE 750 MG/1
TABLET, FILM COATED, EXTENDED RELEASE ORAL
Qty: 30 TABLET | Refills: 0 | Status: SHIPPED | OUTPATIENT
Start: 2019-10-16 | End: 2019-12-30

## 2019-10-30 ENCOUNTER — HOSPITAL ENCOUNTER (OUTPATIENT)
Dept: CV DIAGNOSTICS | Facility: HOSPITAL | Age: 84
Discharge: HOME | End: 2019-10-30
Attending: INTERNAL MEDICINE
Payer: MEDICARE

## 2019-10-30 DIAGNOSIS — I50.9 CHRONIC CONGESTIVE HEART FAILURE, UNSPECIFIED HEART FAILURE TYPE (HCC): ICD-10-CM

## 2019-10-30 PROCEDURE — 93306 TTE W/DOPPLER COMPLETE: CPT | Performed by: INTERNAL MEDICINE

## 2019-11-05 ENCOUNTER — APPOINTMENT (OUTPATIENT)
Dept: LAB | Age: 84
End: 2019-11-05
Attending: INTERNAL MEDICINE
Payer: MEDICARE

## 2019-11-05 DIAGNOSIS — R73.03 PRE-DIABETES: ICD-10-CM

## 2019-11-05 DIAGNOSIS — Z13.220 SCREENING CHOLESTEROL LEVEL: ICD-10-CM

## 2019-11-05 DIAGNOSIS — I10 ESSENTIAL HYPERTENSION: ICD-10-CM

## 2019-11-05 PROCEDURE — 82570 ASSAY OF URINE CREATININE: CPT

## 2019-11-05 PROCEDURE — 80053 COMPREHEN METABOLIC PANEL: CPT

## 2019-11-05 PROCEDURE — 83036 HEMOGLOBIN GLYCOSYLATED A1C: CPT

## 2019-11-05 PROCEDURE — 82043 UR ALBUMIN QUANTITATIVE: CPT

## 2019-11-05 PROCEDURE — 80061 LIPID PANEL: CPT

## 2019-11-06 DIAGNOSIS — R73.03 PRE-DIABETES: Primary | ICD-10-CM

## 2019-11-06 DIAGNOSIS — I10 ESSENTIAL (PRIMARY) HYPERTENSION: ICD-10-CM

## 2019-11-06 DIAGNOSIS — Z13.220 SCREENING CHOLESTEROL LEVEL: ICD-10-CM

## 2019-12-30 ENCOUNTER — APPOINTMENT (OUTPATIENT)
Dept: GENERAL RADIOLOGY | Age: 84
End: 2019-12-30
Attending: EMERGENCY MEDICINE
Payer: MEDICARE

## 2019-12-30 ENCOUNTER — HOSPITAL ENCOUNTER (OUTPATIENT)
Age: 84
Discharge: HOME | End: 2019-12-30
Attending: EMERGENCY MEDICINE
Payer: MEDICARE

## 2019-12-30 VITALS
BODY MASS INDEX: 35.44 KG/M2 | TEMPERATURE: 99 F | DIASTOLIC BLOOD PRESSURE: 76 MMHG | WEIGHT: 200 LBS | SYSTOLIC BLOOD PRESSURE: 154 MMHG | HEIGHT: 63 IN | HEART RATE: 68 BPM | OXYGEN SATURATION: 93 % | RESPIRATION RATE: 20 BRPM

## 2019-12-30 DIAGNOSIS — J20.9 ACUTE BRONCHITIS, UNSPECIFIED ORGANISM: Primary | ICD-10-CM

## 2019-12-30 PROCEDURE — 99213 OFFICE O/P EST LOW 20 MIN: CPT

## 2019-12-30 PROCEDURE — 99214 OFFICE O/P EST MOD 30 MIN: CPT

## 2019-12-30 PROCEDURE — 71046 X-RAY EXAM CHEST 2 VIEWS: CPT | Performed by: EMERGENCY MEDICINE

## 2019-12-30 RX ORDER — POTASSIUM CHLORIDE 750 MG/1
TABLET, FILM COATED, EXTENDED RELEASE ORAL
Qty: 30 TABLET | Refills: 10 | Status: SHIPPED | OUTPATIENT
Start: 2019-12-30 | End: 2020-03-20

## 2019-12-30 RX ORDER — AZITHROMYCIN 250 MG/1
TABLET, FILM COATED ORAL
Qty: 1 PACKAGE | Refills: 0 | Status: SHIPPED | OUTPATIENT
Start: 2019-12-30 | End: 2020-01-03

## 2019-12-30 RX ORDER — PREDNISONE 20 MG/1
60 TABLET ORAL DAILY
Qty: 15 TABLET | Refills: 0 | Status: SHIPPED | OUTPATIENT
Start: 2019-12-30 | End: 2020-01-04

## 2019-12-30 NOTE — ED PROVIDER NOTES
Patient Seen in: [address]      History   Patient presents with:  Cough/URI    Stated Complaint: cough / fatigue / congestion    HPI    Patient is an 59-year-old female comes the ED for evaluation of cough and cold symptom [facility]   • LUMBAR EPIDURAL N/A 9/8/2014    Performed by Walter Leigh MD at [facility] N/A 8/25/2014    Performed by Walter Leigh MD at [facility]   • TOTAL ABDOM HYST dry  NEURO:  no focal deficits    ED Course   Labs Reviewed - No data to display       Xr Chest Pa + Lat Chest (cpt=71046)    Result Date: 12/30/2019  PROCEDURE:  XR CHEST PA + LAT CHEST (CPT=71046)  INDICATIONS:  cough / fatigue / congestion  COMPARISON: home.          Disposition and Plan     Clinical Impression:  Acute bronchitis, unspecified organism  (primary encounter diagnosis)    Disposition:  There is no disposition on file for this visit. There is no disposition time on file for this visit.     Fo

## 2019-12-30 NOTE — TELEPHONE ENCOUNTER
LOV: 10/7/19 w/Dr. Shanthi Mcdonald     LAST LAB: CMP 11/5/19; K+ 4.7    LAST RX: 10/16/19, 30 tabs, 0 refills    Next OV: No future appointments.

## 2020-01-03 ENCOUNTER — HOSPITAL ENCOUNTER (EMERGENCY)
Facility: HOSPITAL | Age: 85
Discharge: HOME | End: 2020-01-03
Attending: EMERGENCY MEDICINE
Payer: MEDICARE

## 2020-01-03 VITALS
BODY MASS INDEX: 35 KG/M2 | HEART RATE: 59 BPM | OXYGEN SATURATION: 93 % | DIASTOLIC BLOOD PRESSURE: 81 MMHG | WEIGHT: 196 LBS | TEMPERATURE: 99 F | RESPIRATION RATE: 25 BRPM | SYSTOLIC BLOOD PRESSURE: 209 MMHG

## 2020-01-03 DIAGNOSIS — I10 HYPERTENSION, UNSPECIFIED TYPE: Primary | ICD-10-CM

## 2020-01-03 LAB
ALBUMIN SERPL-MCNC: 3.2 G/DL (ref 3.4–5)
ALBUMIN/GLOB SERPL: 0.8 {RATIO} (ref 1–2)
ALP LIVER SERPL-CCNC: 100 U/L (ref 55–142)
ALT SERPL-CCNC: 30 U/L (ref 13–56)
ANION GAP SERPL CALC-SCNC: 6 MMOL/L (ref 0–18)
AST SERPL-CCNC: 38 U/L (ref 15–37)
BASOPHILS # BLD: 0 X10(3) UL (ref 0–0.2)
BASOPHILS NFR BLD: 0 %
BILIRUB SERPL-MCNC: 0.4 MG/DL (ref 0.1–2)
BUN BLD-MCNC: 24 MG/DL (ref 7–18)
BUN/CREAT SERPL: 30.4 (ref 10–20)
CALCIUM BLD-MCNC: 9.1 MG/DL (ref 8.5–10.1)
CHLORIDE SERPL-SCNC: 108 MMOL/L (ref 98–112)
CO2 SERPL-SCNC: 26 MMOL/L (ref 21–32)
CREAT BLD-MCNC: 0.79 MG/DL (ref 0.55–1.02)
DEPRECATED RDW RBC AUTO: 50.2 FL (ref 35.1–46.3)
EOSINOPHIL # BLD: 0 X10(3) UL (ref 0–0.7)
EOSINOPHIL NFR BLD: 0 %
ERYTHROCYTE [DISTWIDTH] IN BLOOD BY AUTOMATED COUNT: 13.9 % (ref 11–15)
GLOBULIN PLAS-MCNC: 4 G/DL (ref 2.8–4.4)
GLUCOSE BLD-MCNC: 120 MG/DL (ref 70–99)
HCT VFR BLD AUTO: 42.8 % (ref 35–48)
HGB BLD-MCNC: 14.1 G/DL (ref 12–16)
LYMPHOCYTES NFR BLD: 0.8 X10(3) UL (ref 1–4)
LYMPHOCYTES NFR BLD: 6 %
M PROTEIN MFR SERPL ELPH: 7.2 G/DL (ref 6.4–8.2)
MCH RBC QN AUTO: 32.8 PG (ref 26–34)
MCHC RBC AUTO-ENTMCNC: 32.9 G/DL (ref 31–37)
MCV RBC AUTO: 99.5 FL (ref 80–100)
MONOCYTES # BLD: 0.8 X10(3) UL (ref 0.1–1)
MONOCYTES NFR BLD: 6 %
MYELOCYTES # BLD: 0.4 X10(3) UL
MYELOCYTES NFR BLD: 3 %
NEUTROPHILS # BLD AUTO: 10.28 X10 (3) UL (ref 1.5–7.7)
NEUTROPHILS NFR BLD: 85 %
NEUTS HYPERSEG # BLD: 11.31 X10(3) UL (ref 1.5–7.7)
OSMOLALITY SERPL CALC.SUM OF ELEC: 295 MOSM/KG (ref 275–295)
PLATELET # BLD AUTO: 235 10(3)UL (ref 150–450)
PLATELET MORPHOLOGY: NORMAL
POTASSIUM SERPL-SCNC: 5.2 MMOL/L (ref 3.5–5.1)
RBC # BLD AUTO: 4.3 X10(6)UL (ref 3.8–5.3)
SODIUM SERPL-SCNC: 140 MMOL/L (ref 136–145)
TOTAL CELLS COUNTED: 100
WBC # BLD AUTO: 13.3 X10(3) UL (ref 4–11)

## 2020-01-03 PROCEDURE — 99284 EMERGENCY DEPT VISIT MOD MDM: CPT | Performed by: EMERGENCY MEDICINE

## 2020-01-03 PROCEDURE — 93010 ELECTROCARDIOGRAM REPORT: CPT | Performed by: EMERGENCY MEDICINE

## 2020-01-03 PROCEDURE — 96376 TX/PRO/DX INJ SAME DRUG ADON: CPT | Performed by: EMERGENCY MEDICINE

## 2020-01-03 PROCEDURE — 80053 COMPREHEN METABOLIC PANEL: CPT | Performed by: EMERGENCY MEDICINE

## 2020-01-03 PROCEDURE — 85027 COMPLETE CBC AUTOMATED: CPT | Performed by: EMERGENCY MEDICINE

## 2020-01-03 PROCEDURE — 85025 COMPLETE CBC W/AUTO DIFF WBC: CPT | Performed by: EMERGENCY MEDICINE

## 2020-01-03 PROCEDURE — 96374 THER/PROPH/DIAG INJ IV PUSH: CPT | Performed by: EMERGENCY MEDICINE

## 2020-01-03 PROCEDURE — 85007 BL SMEAR W/DIFF WBC COUNT: CPT | Performed by: EMERGENCY MEDICINE

## 2020-01-03 PROCEDURE — 93005 ELECTROCARDIOGRAM TRACING: CPT

## 2020-01-03 RX ORDER — LABETALOL HYDROCHLORIDE 5 MG/ML
10 INJECTION, SOLUTION INTRAVENOUS ONCE
Status: COMPLETED | OUTPATIENT
Start: 2020-01-03 | End: 2020-01-03

## 2020-01-04 LAB
ATRIAL RATE: 67 BPM
P AXIS: 57 DEGREES
P-R INTERVAL: 176 MS
Q-T INTERVAL: 390 MS
QRS DURATION: 72 MS
QTC CALCULATION (BEZET): 412 MS
R AXIS: 25 DEGREES
T AXIS: 29 DEGREES
VENTRICULAR RATE: 67 BPM

## 2020-01-04 NOTE — ED INITIAL ASSESSMENT (HPI)
Patient arrives from home with c/o hypertension states routine blood pressure check was elevated. States recent respiratory illness.

## 2020-01-04 NOTE — ED PROVIDER NOTES
Patient Seen in: [facility] Emergency Department      History   Patient presents with:  Hypertension    Stated Complaint: /140 at nursing home. slightly KIM    HPI    Patient here for further evaluation of asymptomatic hypertension.   She has [01/03/20 1807]   BP (!) 220/117   Pulse 76   Resp 24   Temp 98.6 °F (37 °C)   Temp src Temporal   SpO2 95 %   O2 Device None (Room air)       Current:BP (!) 209/81   Pulse 59   Temp 98.6 °F (37 °C) (Temporal)   Resp 25   Wt 88.9 kg   SpO2 93%   BMI 34.72 All other components within normal limits   CBC W/ DIFFERENTIAL - Abnormal; Notable for the following components:    WBC 13.3 (*)     RDW-SD 50.2 (*)     Neutrophil Absolute Prelim 10.28 (*)     All other components within normal limits   CBC WITH DIFFEREN

## 2020-01-07 ENCOUNTER — OFFICE VISIT (OUTPATIENT)
Dept: INTERNAL MEDICINE CLINIC | Facility: CLINIC | Age: 85
End: 2020-01-07
Payer: MEDICARE

## 2020-01-07 VITALS
BODY MASS INDEX: 36.5 KG/M2 | DIASTOLIC BLOOD PRESSURE: 70 MMHG | SYSTOLIC BLOOD PRESSURE: 138 MMHG | WEIGHT: 206 LBS | TEMPERATURE: 98 F | HEIGHT: 63 IN | OXYGEN SATURATION: 94 % | HEART RATE: 74 BPM | RESPIRATION RATE: 18 BRPM

## 2020-01-07 DIAGNOSIS — J44.9 CHRONIC OBSTRUCTIVE PULMONARY DISEASE, UNSPECIFIED COPD TYPE (HCC): ICD-10-CM

## 2020-01-07 DIAGNOSIS — I10 ESSENTIAL HYPERTENSION: ICD-10-CM

## 2020-01-07 DIAGNOSIS — I48.0 PAROXYSMAL ATRIAL FIBRILLATION (HCC): Primary | ICD-10-CM

## 2020-01-07 DIAGNOSIS — I50.9 CHRONIC CONGESTIVE HEART FAILURE, UNSPECIFIED HEART FAILURE TYPE (HCC): ICD-10-CM

## 2020-01-07 PROCEDURE — 99213 OFFICE O/P EST LOW 20 MIN: CPT | Performed by: INTERNAL MEDICINE

## 2020-01-07 RX ORDER — CARVEDILOL 6.25 MG/1
12.5 TABLET ORAL 2 TIMES DAILY WITH MEALS
COMMUNITY
Start: 2020-01-07 | End: 2020-02-10

## 2020-01-07 NOTE — PROGRESS NOTES
Patient presents with:  ER F/U: RG rm 8 F/u bronchitis and BP      HPI:  Here for f/u htn at [facility]. Pt had uri, started steroids, cuased bp elevation to 200 sbp, no symptoms with it. No ha or dizziness. No chest pain or sob. Coreg was doubled.  Her hr is norm puff into the lungs every 12 (twelve) hours. Dx: COPD 1 Package 11   • albuterol sulfate (2.5 MG/3ML) 0.083% Inhalation Nebu Soln Take 3 mL (2.5 mg total) by nebulization every 4 (four) hours as needed for Wheezing.  120 mL 3   • Albuterol Sulfate HFA (PROA (hcc)  Chronic obstructive pulmonary disease, unspecified copd type (hcc)  Essential hypertension  Avoid prednisone if able  Continue surrent meds including higher dose of coreg  bp improved  F/u with me in 2-4 weeks for a bp check.    No orders of the defi

## 2020-01-16 RX ORDER — LISINOPRIL 10 MG/1
TABLET ORAL
Qty: 90 TABLET | Refills: 0 | OUTPATIENT
Start: 2020-01-16

## 2020-01-16 NOTE — TELEPHONE ENCOUNTER
Last office visit: 7/31/19 /70  Last refill: 4/5/19  Last CMP: 1/3/2020  Future Appointments   Date Time Provider Sonal Ferguson   2/7/2020  1:15 PM Sussy Aguilera MD [facility]

## 2020-01-17 ENCOUNTER — TELEPHONE (OUTPATIENT)
Dept: INTERNAL MEDICINE CLINIC | Facility: CLINIC | Age: 85
End: 2020-01-17

## 2020-01-17 ENCOUNTER — HOSPITAL ENCOUNTER (OUTPATIENT)
Age: 85
Discharge: HOME | End: 2020-01-17
Attending: FAMILY MEDICINE
Payer: MEDICARE

## 2020-01-17 VITALS
OXYGEN SATURATION: 96 % | WEIGHT: 200 LBS | DIASTOLIC BLOOD PRESSURE: 84 MMHG | TEMPERATURE: 99 F | SYSTOLIC BLOOD PRESSURE: 146 MMHG | RESPIRATION RATE: 18 BRPM | HEART RATE: 74 BPM | BODY MASS INDEX: 35.44 KG/M2 | HEIGHT: 63 IN

## 2020-01-17 DIAGNOSIS — N30.01 ACUTE CYSTITIS WITH HEMATURIA: Primary | ICD-10-CM

## 2020-01-17 LAB
POCT BILIRUBIN URINE: NEGATIVE
POCT GLUCOSE URINE: NEGATIVE MG/DL
POCT KETONE URINE: NEGATIVE MG/DL
POCT NITRITE URINE: POSITIVE
POCT PH URINE: 5.5 (ref 5–8)
POCT SPECIFIC GRAVITY URINE: 1.02
POCT UROBILINOGEN URINE: 0.2 MG/DL

## 2020-01-17 PROCEDURE — 99214 OFFICE O/P EST MOD 30 MIN: CPT

## 2020-01-17 PROCEDURE — 87186 SC STD MICRODIL/AGAR DIL: CPT | Performed by: FAMILY MEDICINE

## 2020-01-17 PROCEDURE — 81002 URINALYSIS NONAUTO W/O SCOPE: CPT | Performed by: FAMILY MEDICINE

## 2020-01-17 PROCEDURE — 87086 URINE CULTURE/COLONY COUNT: CPT | Performed by: FAMILY MEDICINE

## 2020-01-17 PROCEDURE — 87088 URINE BACTERIA CULTURE: CPT | Performed by: FAMILY MEDICINE

## 2020-01-17 RX ORDER — LISINOPRIL 10 MG/1
TABLET ORAL
Qty: 90 TABLET | Refills: 0 | OUTPATIENT
Start: 2020-01-17

## 2020-01-17 RX ORDER — CEPHALEXIN 500 MG/1
500 CAPSULE ORAL 3 TIMES DAILY
Qty: 21 CAPSULE | Refills: 0 | Status: SHIPPED | OUTPATIENT
Start: 2020-01-17 | End: 2020-01-24 | Stop reason: WASHOUT

## 2020-01-17 RX ORDER — LISINOPRIL 10 MG/1
TABLET ORAL
Qty: 90 TABLET | Refills: 10 | OUTPATIENT
Start: 2020-01-17

## 2020-01-17 NOTE — TELEPHONE ENCOUNTER
Pt's daughter, Quinton Robbins on [employer], stated mom found blood on her pad and has lower back pain and is concerned. Pt is at [facility] assisted living.

## 2020-01-17 NOTE — TELEPHONE ENCOUNTER
Pt's dtr Avi Birmingham indicates pt lives at [facility], patient went down to the RN at that facility, called dtr and indicated pt will need to go to [facility] for evaluation due to bright red blood on her Poise pad. .  Avi Birmingham notified she should take her to  for evalua

## 2020-01-17 NOTE — ED PROVIDER NOTES
Patient Seen in: [address] [facility]      History   Patient presents with:  Urinary Symptoms    Stated Complaint: urinary complaint    HPI    49-year-old female with a history of diabetes type 2, atrial fibrillation, abdominal herni PAIN MANAGEMENT   • LUMBAR EPIDURAL N/A 8/12/2015    Performed by Ena Aguillon MD at [address] [place] [address] N/A 9/8/2014    Performed by Ena Aguillon MD at [address] [place] [address] N/A 8/25/2014 +BS. soft. NT. No rebound. No guarding. Neg Madsen's, Neg McBurney's, Neg Rovsing. No mass. Ventral hernia. No CVAT to percussion Mauricio  Neuro: A&O x3. No focal deficits. Vascular: Peripheral pulses intact. No edema  Skin: Intact. No lesions noted.   Psyc

## 2020-01-17 NOTE — TELEPHONE ENCOUNTER
Last office visit: 7/12/19 /84  Last refill: 4/5/19 Qty: 90 with 1 refill  Last comp: 1/3/2020 by Dr. Gerry Burris   Date Time Provider Sonal Ferguson   2/7/2020  1:15 PM Paul Summers MD [facility]     Hypertension Medications Protocol Passed1/17 2:34 PM   CMP or BMP in past 12 months    Last serum creatinine< 2.0    Appointment in past 6 or next 3 months     WOULD YOU LIKE TO SEE PATIENT FOR FOLLOW UP HYPERTENSION APPOINTMENT?  PLEASE ADVISE

## 2020-01-17 NOTE — ED INITIAL ASSESSMENT (HPI)
The patient states she noticed this morning her urine was very dark, almost brown in color. Later in the morning she noticed blood on her pad when she went to the bathroom.   She states it was a significant amount and larger than a half dollar and about 1/

## 2020-01-21 RX ORDER — LISINOPRIL 10 MG/1
10 TABLET ORAL DAILY
Qty: 90 TABLET | Refills: 1 | Status: SHIPPED | OUTPATIENT
Start: 2020-01-21 | End: 2020-04-21

## 2020-01-21 NOTE — TELEPHONE ENCOUNTER
Last Office Visit: 1-7-20 with [facility] for ER follow up   Last Rx Filled: historical   Last Labs: 1-3-20 cbc/cmp  Future Appointment: 2-7-20    Per protocol to provider     Spoke to patients daughter Sergio Bruner and she states long term meds should be filled through

## 2020-01-21 NOTE — TELEPHONE ENCOUNTER
[facility] called stating they have been faxing us for refills but have not heard back from us on lisinopril 10 MG Oral Tab and xarelto 20 which we have not filled here before

## 2020-02-04 RX ORDER — FUROSEMIDE 20 MG/1
TABLET ORAL
Qty: 180 TABLET | Refills: 2 | OUTPATIENT
Start: 2020-02-04

## 2020-02-04 NOTE — TELEPHONE ENCOUNTER
LOV:7-    LAST LAB : cmp 1/3/2020    LAST RX 4- x3 months    Next OV   Future Appointments   Date Time Provider Sonal Ferguson   2/7/2020  1:15 PM Alli Mendiola MD [facility]         PROTOCOL      Hypertension Medications Prot

## 2020-02-07 RX ORDER — FUROSEMIDE 20 MG/1
TABLET ORAL
Qty: 180 TABLET | Refills: 3 | Status: SHIPPED | OUTPATIENT
Start: 2020-02-07 | End: 2020-03-20

## 2020-02-07 NOTE — TELEPHONE ENCOUNTER
Volodymyr 70 [phone]. Stated pt is no longer a pt of Dr Pratibha Quijano and pt needs this refill sent. Will AS send him the refill?  Please advise   FUROSEMIDE 20 MG Oral Tab [Pharmacy Med Name: FUROSEMIDE 20 MG TABLET] 180 tablet 3 2/7/2020    Sig:

## 2020-02-07 NOTE — TELEPHONE ENCOUNTER
Prescription Refill Request - Patient advised can take 48-72 hours. Name of Medication (strength, dose, qty requested:   carvedilol 6.25 MG Oral Tab   1/7/2020     Sig - Route: Take 2 tablets (12.5 mg total) by mouth 2 (two) times daily with meals.  - Or

## 2020-02-10 RX ORDER — CARVEDILOL 6.25 MG/1
12.5 TABLET ORAL 2 TIMES DAILY WITH MEALS
Qty: 120 TABLET | Refills: 0 | Status: SHIPPED | OUTPATIENT
Start: 2020-02-10 | End: 2020-03-09

## 2020-02-19 ENCOUNTER — OFFICE VISIT (OUTPATIENT)
Dept: INTERNAL MEDICINE CLINIC | Facility: CLINIC | Age: 85
End: 2020-02-19
Payer: MEDICARE

## 2020-02-19 VITALS
SYSTOLIC BLOOD PRESSURE: 126 MMHG | HEART RATE: 82 BPM | WEIGHT: 208 LBS | TEMPERATURE: 98 F | BODY MASS INDEX: 36.86 KG/M2 | RESPIRATION RATE: 18 BRPM | HEIGHT: 63 IN | OXYGEN SATURATION: 95 % | DIASTOLIC BLOOD PRESSURE: 60 MMHG

## 2020-02-19 DIAGNOSIS — J44.9 CHRONIC OBSTRUCTIVE PULMONARY DISEASE, UNSPECIFIED COPD TYPE (HCC): ICD-10-CM

## 2020-02-19 DIAGNOSIS — I50.9 CHRONIC CONGESTIVE HEART FAILURE, UNSPECIFIED HEART FAILURE TYPE (HCC): ICD-10-CM

## 2020-02-19 DIAGNOSIS — I10 ESSENTIAL HYPERTENSION: Primary | ICD-10-CM

## 2020-02-19 PROCEDURE — 99214 OFFICE O/P EST MOD 30 MIN: CPT | Performed by: INTERNAL MEDICINE

## 2020-02-19 NOTE — PROGRESS NOTES
Patient presents with:  Hypertension: RG rm 9 f/u BP      HPI:  Here for f/u of htn, copd, chf, all stable taking meds no se's. Feels well. bp stable anc controlled. Review of Systems   No f/c/chest pain or sob. No cough. No abd pain/n/v/d.  No ha or di DAY WITH MEALS 60 tablet 0   • fluticasone-salmeterol (ADVAIR DISKUS) 250-50 MCG/DOSE Inhalation Aerosol Powder, Breath Activated Inhale 1 puff into the lungs every 12 (twelve) hours.  Dx: COPD 1 Package 11   • albuterol sulfate (2.5 MG/3ML) 0.083% Inhalati (primary encounter diagnosis)  Chronic congestive heart failure, unspecified heart failure type (hcc)  Chronic obstructive pulmonary disease, unspecified copd type (hcc)  Stable chronic issues, continue meds  See me in 6 mos for well visit  No orders of th

## 2020-02-21 ENCOUNTER — TELEPHONE (OUTPATIENT)
Dept: INTERNAL MEDICINE CLINIC | Facility: CLINIC | Age: 85
End: 2020-02-21

## 2020-02-21 NOTE — TELEPHONE ENCOUNTER
Received fax from [facility] with patients logged blood pressure and pulse. Placed in [facility] bin for review.

## 2020-03-03 ENCOUNTER — TELEPHONE (OUTPATIENT)
Dept: INTERNAL MEDICINE CLINIC | Facility: CLINIC | Age: 85
End: 2020-03-03

## 2020-03-03 NOTE — TELEPHONE ENCOUNTER
LM for pt at [phone] (M)vm to call back to discuss symptoms. Notified if s/s worsen or change that she will need to go to the ER for evaluation.

## 2020-03-03 NOTE — TELEPHONE ENCOUNTER
Patient made an appointment on my-chart   Future Appointments   Date Time Provider Snoal Ferguson   3/17/2020  2:30 PM Eduardo Ge MD [facility]           Experiencing exhaustion and racing heart. Tire easily.  Want to make sure meds are c

## 2020-03-03 NOTE — TELEPHONE ENCOUNTER
Pt's dtr Tricia Acevedo indicates pt was just here for 2/19/20 with [facility], but in the last few days pt feels her heart is racing on and off, dtr states pt panics sometimes then calms down and she feels fine.   Tricia Acevedo states pt was told to double her Carvedilol to 4 tabl

## 2020-03-04 NOTE — TELEPHONE ENCOUNTER
I called and spoke with the dtr Braden Osborne. She has spoken with her mom already this morning and it is not racing now. Advised if this returns or any other symptoms she should take her to the ER. She states understanding and agrees to plan.     Dtr Braden Osborne is [employer]

## 2020-03-09 RX ORDER — CARVEDILOL 6.25 MG/1
TABLET ORAL
Qty: 120 TABLET | Refills: 10 | Status: SHIPPED | OUTPATIENT
Start: 2020-03-09 | End: 2020-03-20

## 2020-03-14 ENCOUNTER — TELEPHONE (OUTPATIENT)
Dept: INTERNAL MEDICINE CLINIC | Facility: CLINIC | Age: 85
End: 2020-03-14

## 2020-03-16 NOTE — TELEPHONE ENCOUNTER
Pt's dtr indicates pt wanted to cancel since she has scheduled an appt with Dr Jag Melissa for 3/30/20, she lives at [facility] which is on quarantine-only meals and vitals for now. Pt has an O2 Sat which she will wear if she feels her heart racing and write the results down per Dr Ashly Vallecillo direction given to her from his staff. Dtr indicates pt denies dizziness or lightheadedness. Pt told dtr she wanted to see AS after she sees Dr Jag Melissa and will call to reschedule. FYI to AS.

## 2020-03-20 ENCOUNTER — APPOINTMENT (OUTPATIENT)
Dept: GENERAL RADIOLOGY | Facility: HOSPITAL | Age: 85
DRG: 193 | End: 2020-03-20
Attending: EMERGENCY MEDICINE
Payer: MEDICARE

## 2020-03-20 ENCOUNTER — HOSPITAL ENCOUNTER (INPATIENT)
Facility: HOSPITAL | Age: 85
LOS: 2 days | Discharge: NURSING, REHAB OR LONG-TERM CARE | DRG: 193 | End: 2020-03-22
Attending: EMERGENCY MEDICINE | Admitting: HOSPITALIST
Payer: MEDICARE

## 2020-03-20 DIAGNOSIS — R06.00 DYSPNEA ON EXERTION: ICD-10-CM

## 2020-03-20 DIAGNOSIS — N39.0 URINARY TRACT INFECTION WITHOUT HEMATURIA, SITE UNSPECIFIED: ICD-10-CM

## 2020-03-20 DIAGNOSIS — J12.9 VIRAL PNEUMONIA: ICD-10-CM

## 2020-03-20 DIAGNOSIS — R50.9 FEVER, UNSPECIFIED FEVER CAUSE: Primary | ICD-10-CM

## 2020-03-20 PROBLEM — R06.09 DYSPNEA ON EXERTION: Status: ACTIVE | Noted: 2020-03-20

## 2020-03-20 PROBLEM — D69.6 THROMBOCYTOPENIA (HCC): Status: ACTIVE | Noted: 2020-03-20

## 2020-03-20 PROBLEM — J44.1 COPD EXACERBATION (HCC): Status: ACTIVE | Noted: 2020-01-07

## 2020-03-20 PROCEDURE — 71045 X-RAY EXAM CHEST 1 VIEW: CPT | Performed by: EMERGENCY MEDICINE

## 2020-03-20 PROCEDURE — 99223 1ST HOSP IP/OBS HIGH 75: CPT | Performed by: HOSPITALIST

## 2020-03-20 RX ORDER — IBUPROFEN 600 MG/1
600 TABLET ORAL EVERY 4 HOURS PRN
Status: DISCONTINUED | OUTPATIENT
Start: 2020-03-20 | End: 2020-03-22

## 2020-03-20 RX ORDER — METOCLOPRAMIDE HYDROCHLORIDE 5 MG/ML
5 INJECTION INTRAMUSCULAR; INTRAVENOUS EVERY 8 HOURS PRN
Status: DISCONTINUED | OUTPATIENT
Start: 2020-03-20 | End: 2020-03-22

## 2020-03-20 RX ORDER — POTASSIUM CHLORIDE 750 MG/1
10 TABLET, EXTENDED RELEASE ORAL DAILY
COMMUNITY
End: 2021-11-08

## 2020-03-20 RX ORDER — FUROSEMIDE 20 MG/1
40 TABLET ORAL DAILY
COMMUNITY
End: 2021-01-19

## 2020-03-20 RX ORDER — SODIUM PHOSPHATE, DIBASIC AND SODIUM PHOSPHATE, MONOBASIC 7; 19 G/133ML; G/133ML
1 ENEMA RECTAL ONCE AS NEEDED
Status: DISCONTINUED | OUTPATIENT
Start: 2020-03-20 | End: 2020-03-22

## 2020-03-20 RX ORDER — IPRATROPIUM BROMIDE AND ALBUTEROL SULFATE 2.5; .5 MG/3ML; MG/3ML
3 SOLUTION RESPIRATORY (INHALATION) EVERY 6 HOURS PRN
Status: DISCONTINUED | OUTPATIENT
Start: 2020-03-20 | End: 2020-03-22

## 2020-03-20 RX ORDER — ONDANSETRON 2 MG/ML
4 INJECTION INTRAMUSCULAR; INTRAVENOUS EVERY 6 HOURS PRN
Status: DISCONTINUED | OUTPATIENT
Start: 2020-03-20 | End: 2020-03-22

## 2020-03-20 RX ORDER — LISINOPRIL 10 MG/1
10 TABLET ORAL DAILY
Status: DISCONTINUED | OUTPATIENT
Start: 2020-03-21 | End: 2020-03-22

## 2020-03-20 RX ORDER — METHYLPREDNISOLONE SODIUM SUCCINATE 125 MG/2ML
80 INJECTION, POWDER, LYOPHILIZED, FOR SOLUTION INTRAMUSCULAR; INTRAVENOUS EVERY 8 HOURS
Status: DISCONTINUED | OUTPATIENT
Start: 2020-03-20 | End: 2020-03-20

## 2020-03-20 RX ORDER — CARVEDILOL 6.25 MG/1
12.5 TABLET ORAL 2 TIMES DAILY WITH MEALS
COMMUNITY
End: 2021-01-19

## 2020-03-20 RX ORDER — MELATONIN
325
Status: DISCONTINUED | OUTPATIENT
Start: 2020-03-21 | End: 2020-03-22

## 2020-03-20 RX ORDER — DOCUSATE SODIUM 100 MG/1
100 CAPSULE, LIQUID FILLED ORAL 2 TIMES DAILY
Status: DISCONTINUED | OUTPATIENT
Start: 2020-03-20 | End: 2020-03-22

## 2020-03-20 RX ORDER — METHYLPREDNISOLONE 4 MG/1
8 TABLET ORAL
Status: COMPLETED | OUTPATIENT
Start: 2020-03-21 | End: 2020-03-21

## 2020-03-20 RX ORDER — IBUPROFEN 200 MG
200 TABLET ORAL EVERY 4 HOURS PRN
Status: DISCONTINUED | OUTPATIENT
Start: 2020-03-20 | End: 2020-03-22

## 2020-03-20 RX ORDER — CARVEDILOL 12.5 MG/1
12.5 TABLET ORAL 2 TIMES DAILY WITH MEALS
Status: DISCONTINUED | OUTPATIENT
Start: 2020-03-20 | End: 2020-03-22

## 2020-03-20 RX ORDER — ACETAMINOPHEN 325 MG/1
650 TABLET ORAL ONCE
Status: COMPLETED | OUTPATIENT
Start: 2020-03-20 | End: 2020-03-20

## 2020-03-20 RX ORDER — ACETAMINOPHEN 500 MG
1000 TABLET ORAL EVERY 6 HOURS PRN
Status: DISCONTINUED | OUTPATIENT
Start: 2020-03-20 | End: 2020-03-22

## 2020-03-20 RX ORDER — PANTOPRAZOLE SODIUM 20 MG/1
20 TABLET, DELAYED RELEASE ORAL
Status: DISCONTINUED | OUTPATIENT
Start: 2020-03-21 | End: 2020-03-22

## 2020-03-20 RX ORDER — ALBUTEROL SULFATE 2.5 MG/3ML
2.5 SOLUTION RESPIRATORY (INHALATION) EVERY 4 HOURS PRN
Status: DISCONTINUED | OUTPATIENT
Start: 2020-03-20 | End: 2020-03-22

## 2020-03-20 RX ORDER — BISACODYL 10 MG
10 SUPPOSITORY, RECTAL RECTAL
Status: DISCONTINUED | OUTPATIENT
Start: 2020-03-20 | End: 2020-03-22

## 2020-03-20 RX ORDER — METHYLPREDNISOLONE 4 MG/1
4 TABLET ORAL
Status: DISCONTINUED | OUTPATIENT
Start: 2020-03-22 | End: 2020-03-22

## 2020-03-20 RX ORDER — ALBUTEROL SULFATE 90 UG/1
2 AEROSOL, METERED RESPIRATORY (INHALATION) EVERY 6 HOURS PRN
Status: DISCONTINUED | OUTPATIENT
Start: 2020-03-20 | End: 2020-03-20 | Stop reason: ALTCHOICE

## 2020-03-20 RX ORDER — POLYETHYLENE GLYCOL 3350 17 G/17G
17 POWDER, FOR SOLUTION ORAL DAILY PRN
Status: DISCONTINUED | OUTPATIENT
Start: 2020-03-20 | End: 2020-03-22

## 2020-03-20 RX ORDER — METHYLPREDNISOLONE 4 MG/1
4 TABLET ORAL
Status: DISCONTINUED | OUTPATIENT
Start: 2020-03-25 | End: 2020-03-22

## 2020-03-20 RX ORDER — METHYLPREDNISOLONE 4 MG/1
4 TABLET ORAL 2 TIMES DAILY
Status: DISCONTINUED | OUTPATIENT
Start: 2020-03-24 | End: 2020-03-22

## 2020-03-20 RX ORDER — IBUPROFEN 400 MG/1
400 TABLET ORAL EVERY 4 HOURS PRN
Status: DISCONTINUED | OUTPATIENT
Start: 2020-03-20 | End: 2020-03-22

## 2020-03-20 RX ORDER — FUROSEMIDE 10 MG/ML
40 INJECTION INTRAMUSCULAR; INTRAVENOUS
Status: DISCONTINUED | OUTPATIENT
Start: 2020-03-20 | End: 2020-03-22

## 2020-03-20 RX ORDER — OSELTAMIVIR PHOSPHATE 30 MG/1
30 CAPSULE ORAL EVERY 12 HOURS SCHEDULED
Status: DISCONTINUED | OUTPATIENT
Start: 2020-03-20 | End: 2020-03-22

## 2020-03-20 RX ORDER — METHYLPREDNISOLONE 4 MG/1
4 TABLET ORAL
Status: DISCONTINUED | OUTPATIENT
Start: 2020-03-23 | End: 2020-03-22

## 2020-03-20 RX ORDER — FUROSEMIDE 10 MG/ML
40 INJECTION INTRAMUSCULAR; INTRAVENOUS ONCE
Status: COMPLETED | OUTPATIENT
Start: 2020-03-20 | End: 2020-03-20

## 2020-03-20 RX ORDER — METHYLPREDNISOLONE 4 MG/1
12 TABLET ORAL
Status: COMPLETED | OUTPATIENT
Start: 2020-03-20 | End: 2020-03-20

## 2020-03-20 RX ORDER — CETIRIZINE HYDROCHLORIDE 5 MG/1
5 TABLET ORAL DAILY
Status: DISCONTINUED | OUTPATIENT
Start: 2020-03-21 | End: 2020-03-22

## 2020-03-20 RX ORDER — METHYLPREDNISOLONE 4 MG/1
4 TABLET ORAL
Status: COMPLETED | OUTPATIENT
Start: 2020-03-21 | End: 2020-03-21

## 2020-03-20 NOTE — PROGRESS NOTES
03/20/20 1625   Clinical Encounter Type   Visited With Health care provider  ( scanned completed POLST at request of TAM Angeles.   Form dated 8/13/17 was scanned into patient's electronic Resuscitation Wishes/POLST medical record.)   Continue Visiti

## 2020-03-20 NOTE — H&P
[facility] HOSPITALIST  History and Physical     Shira Clint Patient Status:  Inpatient    1933 MRN [id number]   [facility] Attending Handy Goff MD   Hosp Day # 0 PCP Jamia Contreras MD     Chief Complaint: SOB    History 9/10/2014    Performed by Maryse Gonsalves DO at [place] [address] N/A 9/15/2015    Performed by Satya Farrar MD at [address] [place] [address] N/A 8/27/2015    Performed by Satya Farrar MD at [facility] daily., Disp: , Rfl:   lisinopril 10 MG Oral Tab, Take 1 tablet (10 mg total) by mouth daily. , Disp: 90 tablet, Rfl: 1  Rivaroxaban (XARELTO) 20 MG Oral Tab, Take 1 tablet (20 mg total) by mouth every morning., Disp: 90 tablet, Rfl: 1  fluticasone-salmeter deformity. Abdomen: Soft, nontender, nondistended. Positive bowel sounds. No rebound or guarding. Neurologic: CNII-XII grossly intact. No focal neurological deficits. Musculoskeletal: Moves all extremities. Extremities: + edema. No cyanosis.   Integum

## 2020-03-20 NOTE — CONSULTS
Pulmonary H&P/Consult       NAME: Nevin Adler - ROOM: [facility]822-J - MRN: [id number] - Age: 80year old - :  1933    Date of Admission: 3/20/2020 11:19 AM  Admission Diagnosis: Dyspnea on exertion [R06.09]  Viral pneumonia [J12.9]  Fever, unspecif • EGD     • LAPAROSCOPIC CHOLECYSTECTOMY      3/2014   • LAPAROSCOPIC VENTRAL HERNIA REPAIR N/A 9/10/2014    Performed by Shoaib Douglass DO at [facility] 9/15/2015    Performed by Ena Aguillon MD at [address] [place] [address] lungs every 6 (six) hours as needed. , Disp: 1 Inhaler, Rfl: 0, 3/20/2020 at 0900  CRANBERRY EXTRACT OR, Take 500 mg by mouth daily. , Disp: , Rfl: , 3/20/2020 at 0900  Probiotic Product (ALIGN) Oral Cap, Take 1 capsule by mouth daily. , Disp: , Rfl: , 3/20 year      Drug use: No      Sexual activity: Not on file    Lifestyle      Physical activity:        Days per week: Not on file        Minutes per session: Not on file      Stress: Not on file    Relationships      Social connections:        Talks on phone 1 tablet (10 mg total) by mouth daily. , Disp: 90 tablet, Rfl: 1  Rivaroxaban (XARELTO) 20 MG Oral Tab, Take 1 tablet (20 mg total) by mouth every morning., Disp: 90 tablet, Rfl: 1  fluticasone-salmeterol (ADVAIR DISKUS) 250-50 MCG/DOSE Inhalation Aerosol P Continuous Infusing Medication:    PRN Medication:albuterol sulfate, acetaminophen, ibuprofen **OR** ibuprofen **OR** ibuprofen, ipratropium-albuterol, PEG 3350, magnesium hydroxide, bisacodyl, Fleet Enema, ondansetron HCl, Metoclopramide HCl     REVIE clear, EOM's intact, both eyes   Throat:   Lips, mucosa, and tongue normal; teeth and gums normal   Neck:   Supple, symmetrical, trachea midline, no adenopathy;        thyroid:  No enlargement/tenderness/nodules; no carotid    bruit or JVD   Back:     Symm

## 2020-03-20 NOTE — ED PROVIDER NOTES
Patient Seen in: [facility] Emergency Department      History   Patient presents with:  Dyspnea KIM SOB    Stated Complaint: KIM    HPI     43-year-old patient with a history of atrial fibrillation, CVA, diabetes, GERD hypertensionatrial fibrillatio MANAGEMENT   • LUMBAR EPIDURAL N/A 8/27/2015    Performed by Allyson Eli MD at [address] [place] [address] N/A 8/12/2015    Performed by Allyson Eli MD at [address] [place] [address] N/A 9/8/2014    Per significant exudate. Her neck is supple nontender to palpation, no significant JVD no anterior cervical lymphadenopathy lungs are with diffuse wheezes and crackles bubbling lung sounds. Her heart has an irregularly irregular rapid rate and rhythm.   Her s Abnormal            Final result                 Please view results for these tests on the individual orders.    ABG PANEL W ELECT AND LACTATE   RAINBOW DRAW BLUE   RAINBOW DRAW LAVENDER   RAINBOW DRAW LIGHT GREEN   RAINBOW DRAW GOLD   BLOOD CULTURE   B on 3/20/2020 at 12:20 PM                     MDM     Very pleasant 51-year-old woman with a history of atrial fibrillation, congestive heart failure, COPD, who lives in a nursing home presents to the emergency department with cough shortness of breath and testing comes back negative perhaps nebulization or steroids would be more indicated however at this time not knowing how that is going to go, I think an intensive care unit bed is the best policy.   We will discussed this with the hospitalist and the inten

## 2020-03-20 NOTE — PLAN OF CARE
Pt received from ED to room 453, droplet and contact plus isolation maintained d/t r/o Covid-19. Pt is A&OX4. O2 maintained on 3L NC.  BP stable, afebrile, NSR w/PAC on monitor. Cardiac diet. Pt voiding per commode. Up with SBA. IV abx.   Pt tested po output and patient weight  - Monitor urine specific gravity, serum osmolarity and serum sodium as indicated or ordered  - Monitor response to interventions for patient's volume status, including labs, urine output, blood pressure (other measures as availab

## 2020-03-20 NOTE — CONSULTS
[facility] Pharmacy Note:  Renal Dose Adjustment for Metoclopramide (REGLAN)    Pooja Rutledge has been prescribed Metoclopramide (REGLAN) 10 mg every 8 hours as needed for nausea/vomiting.     Estimated Creatinine Clearance: 31.8 mL/min (A) (based on SCr of 1.05

## 2020-03-20 NOTE — PROGRESS NOTES
Patient to ICU. Patient states she is feeling much better since arrival to the hospital.  POLST form noted. Patient verifies that she does not want CPR or intubation. Respirations easy.       PRECIOUS Walsh  Critical Care NP  Phone [phone], pager [phone]

## 2020-03-20 NOTE — ED NOTES
Confirmed with RT that Covid test was done, RT will send the label to the lab to be placed with patient resp swab.

## 2020-03-20 NOTE — CONSULTS
INFECTIOUS DISEASE [address] [facility] Patient Status:  Inpatient    1933 MRN [id number]   [facility] 4SW-A Attending Pamela Joseph MD   Hosp Day # 0 PCP Tima Carmichael 9/8/2014    Performed by Andrew Roger MD at [address] [facility] 8/25/2014    Performed by Andrew Roger MD at [facility]   • TOTAL ABDOM HYSTERECTOMY     • Marco Antonio 69  3/31/14     Oral, QAM AC  •  Rivaroxaban (XARELTO) tab 20 mg, 20 mg, Oral, QAM  •  acetaminophen (TYLENOL EXTRA STRENGTH) tab 1,000 mg, 1,000 mg, Oral, Q6H PRN  •  ibuprofen (MOTRIN) tab 200 mg, 200 mg, Oral, Q4H PRN **OR** ibuprofen (MOTRIN) tab 400 mg, 400 mg, Oral, Breath Activated, Inhale 1 puff into the lungs every 12 (twelve) hours.  Dx: COPD, Disp: 1 Package, Rfl: 11  albuterol sulfate (2.5 MG/3ML) 0.083% Inhalation Nebu Soln, Take 3 mL (2.5 mg total) by nebulization every 4 (four) hours as needed for Wheezing., D HGB 14.0   HCT 43.6   .6*   MCH 32.9   MCHC 32.1   RDW 13.9   NEPRELIM 4.51   WBC 6.4   .0*       Recent Labs   Lab 03/20/20  1142   GLU 85   BUN 17   CREATSERUM 1.05*   GFRAA 56*   GFRNAA 48*   CA 8.9   ALB 3.2*      K 4.4

## 2020-03-20 NOTE — ED NOTES
Pt was given lasix so a bedside commode was placed in the room with some baby wipes. Pt instructed to call RN if she needs assistance. Pt ambulates at home with walker.

## 2020-03-21 PROCEDURE — 99232 SBSQ HOSP IP/OBS MODERATE 35: CPT | Performed by: HOSPITALIST

## 2020-03-21 RX ORDER — POTASSIUM CHLORIDE 20 MEQ/1
40 TABLET, EXTENDED RELEASE ORAL ONCE
Status: COMPLETED | OUTPATIENT
Start: 2020-03-21 | End: 2020-03-21

## 2020-03-21 NOTE — PROGRESS NOTES
[facility] HOSPITALIST  Progress Note     Yasmin Díaz Patient Status:  Inpatient    1933 MRN [id number]   [facility] 8NE-A Attending Lay Xie MD   Hosp Day # 1 PCP Duncan Arechiga MD     Chief Complaint: SOB  S:  Feels little Oral Daily   • Pantoprazole Sodium  20 mg Oral QAM AC   • Rivaroxaban  20 mg Oral QAM   • docusate sodium  100 mg Oral BID   • furosemide  40 mg Intravenous BID (Diuretic)   • cefTRIAXone  1 g Intravenous Q24H   • methylPREDNISolone  4 mg Oral TID CC   • m

## 2020-03-21 NOTE — PROGRESS NOTES
Pt. transferred here from ICU , alert and o x 4 , on O2 at 2 li/min via nc. Still c/o SOB with exertion , needs x 1 assist with ambulation with walker. Tele shows SR with frequent PAC's. POC instructed , verbalized understanding. Cont.  Monitor per t

## 2020-03-21 NOTE — PROGRESS NOTES
Pulmonary Progress Note      NAME: Yasmin Richardlles - ROOM: [id number] - MRN: [id number] - Age: 80year old - : 1933    Assessment/Plan:  1. COPD exacerbation due to Flu-A  -steroids- oral   -breo for advair  -bd protocol  2.  Influenza-A  -tamiflu kg/m²   Physical Exam:   General: alert, cooperative, no respiratory distress. HEENT: Normocephalic atraumatic. Lips, mucosa, and tongue normal.  No thrush noted.    Neck: supple without mass   Lungs: mild end exp wheeze, good air movement   Chest wall: No

## 2020-03-21 NOTE — PLAN OF CARE
AOx4. Calm, cooperative. Up with standby assist and walker. Ambulating in room to bathroom. NSR-ST on cardiac monitor. Adequate saturation on 2L NC. IGLESIAS and expiratory wheezing present. Neb treatments prn, respiratory contacted. On medrol and tamiflu. Monitor urine specific gravity, serum osmolarity and serum sodium as indicated or ordered  - Monitor response to interventions for patient's volume status, including labs, urine output, blood pressure (other measures as available)  - Encourage oral intake

## 2020-03-21 NOTE — PLAN OF CARE
Received after rn report at [address]. Pt is alert and oriented x4. On 3L nc. Continues to have dyspnea on exertion but reports overall improved breathing. Sr on monitor wit pac's. Up to bedside commode. Tolerating activity fair.  An update ws given to daughter

## 2020-03-22 VITALS
RESPIRATION RATE: 20 BRPM | HEART RATE: 94 BPM | SYSTOLIC BLOOD PRESSURE: 144 MMHG | BODY MASS INDEX: 36.14 KG/M2 | TEMPERATURE: 98 F | HEIGHT: 63 IN | DIASTOLIC BLOOD PRESSURE: 90 MMHG | OXYGEN SATURATION: 96 % | WEIGHT: 203.94 LBS

## 2020-03-22 PROCEDURE — 99239 HOSP IP/OBS DSCHRG MGMT >30: CPT | Performed by: HOSPITALIST

## 2020-03-22 RX ORDER — METHYLPREDNISOLONE 4 MG/1
TABLET ORAL
Qty: 21 TABLET | Refills: 0 | Status: SHIPPED | OUTPATIENT
Start: 2020-03-22 | End: 2020-08-03

## 2020-03-22 RX ORDER — OSELTAMIVIR PHOSPHATE 30 MG/1
30 CAPSULE ORAL EVERY 12 HOURS SCHEDULED
Qty: 6 CAPSULE | Refills: 0 | Status: SHIPPED | OUTPATIENT
Start: 2020-03-22 | End: 2020-08-03

## 2020-03-22 RX ORDER — FUROSEMIDE 40 MG/1
40 TABLET ORAL DAILY
Status: DISCONTINUED | OUTPATIENT
Start: 2020-03-22 | End: 2020-03-22

## 2020-03-22 RX ORDER — CEFUROXIME AXETIL 500 MG/1
500 TABLET ORAL 2 TIMES DAILY
Qty: 10 TABLET | Refills: 0 | Status: SHIPPED | OUTPATIENT
Start: 2020-03-22 | End: 2020-03-27

## 2020-03-22 NOTE — PROGRESS NOTES
Pulmonary Progress Note      NAME: Simran Montoya - ROOM: [facility]8160-T - MRN: [id number] - Age: 80year old - : 1933    Assessment/Plan:  1. COPD exacerbation due to Flu-A  -steroids- oral   -breo for advair  -bd protocol  2.  Influenza-A  -tamiflu and tongue normal.  No thrush noted. Neck: supple without mass   Lungs: mild wheezing but great air movement   Chest wall: No tenderness or deformity. Heart: irregularly irregular   Abdomen: soft, non-tender, non-distended, positive BS.    Extremity: no

## 2020-03-22 NOTE — DISCHARGE SUMMARY
Yina Okeefe HOSPITALIST  DISCHARGE SUMMARY     [facility] Patient Status:  Inpatient    1933 MRN [id number]   [facility] 8NE-A Attending Vijay Ricketts MD   Hosp Day # 2 PCP Shweta Ricardo MD     Date of Admission: 3/20/2020  Date o Synopsis: patient isolated and placed on O2. ID and pulmonary consulted. Patient  overloaded as well. Patient diuresed and placed on tamiflu for +H1N1 influenza. Patient  improved and t/f out of ICU.   She continued to defervesce throughout the hospital name, and this prescription was added. Make sure you understand how and when to take each. Take 1 tablet (15 mg total) by mouth daily with food.    Quantity:  30 tablet  Refills:  0        CONTINUE taking these medications      Instructions Prescriptio by mouth daily. Refills:  0           Where to Get Your Medications      These medications were sent to [facility]. [address], [address] [phone], [phone] [address] [facility], [address] [address]    Phone:  [phone]

## 2020-03-22 NOTE — PROGRESS NOTES
[facility] HOSPITALIST  Progress Note     Albaro Olson Patient Status:  Inpatient    1933 MRN [id number]   [facility] 8NE-A Attending Catherine Guillory MD   Hosp Day # 2 PCP Sawyer Fine MD     Chief Complaint: SOB  S:  Feels little Furoate-Vilanterol  1 puff Inhalation Daily   • lisinopril  10 mg Oral Daily   • Cetirizine HCl  5 mg Oral Daily   • Pantoprazole Sodium  20 mg Oral QAM AC   • Rivaroxaban  20 mg Oral QAM   • docusate sodium  100 mg Oral BID   • furosemide  40 mg Intraveno

## 2020-03-22 NOTE — CM/SW NOTE
Pt is ready for d/c today. Pt is from [address] [place] [address]. She can return to [address] [facility] today. Pt says her dtr is unable to pick her up.   Pt would like a medicar for transport - she understands and is agreeable to the cost.  [employer]

## 2020-03-22 NOTE — PROGRESS NOTES
[facility]                INFECTIOUS DISEASE PROGRESS NOTE    Nevin Adler Patient Status:  Inpatient    1933 MRN [id number]   [facility] 8NE-A Attending Saurabh Avelar MD   Hosp Day # 2 PCP MD Virginia Camargo Time: 03/20/20 11:44 AM   Result Value Ref Range    Blood Culture Result No Growth 2 Days N/A           Problem list reviewed:  Patient Active Problem List:     HTN (hypertension)     Atrial fibrillation (HCC)     CHF (congestive heart failure) ([facility]     R

## 2020-03-22 NOTE — PLAN OF CARE
NURSING DISCHARGE NOTE    Discharged Nursing home via Ambulance. Accompanied by Support staff  Belongings Taken by patient/family. Pt verbalizes understanding of d/c teaching. Pt understands to  medications and to schedule f/u appointments.  Pt

## 2020-03-22 NOTE — PLAN OF CARE
Rcv'd A/o/3  Denies pain or discomfort  On tele with SR and pvc's  Xarelto  Lung sounds clear/diminished bilateral  HOB @ 30  UTI  IV antibiotics  Tamiflu  Droplet Isolation for H1N1  Fall risk bed alarm noted   Problem: RESPIRATORY - ADULT  Goal: Achieves appropriate  - Instruct patient on fluid and nutrition restrictions as appropriate  Outcome: Progressing

## 2020-03-22 NOTE — PLAN OF CARE
Received patient at 0730. Alert and Oriented x4. Tele Rhythm NSR/ST. O2 saturation 96% on room air. IGLESIAS, Breath sounds diminished. Bed is locked and in low position. Call light and personal items within reach. No C/O chest pain.  Pt ambulated in hallway W/ RN  Outcome: Progressing  3/22/2020 0957 by Erik Fletcher RN  Outcome: Progressing     Problem: METABOLIC/FLUID AND ELECTROLYTES - ADULT  Goal: Electrolytes maintained within normal limits  Description  INTERVENTIONS:  - Monitor labs and rhythm and asse

## 2020-03-30 ENCOUNTER — OFFICE VISIT (OUTPATIENT)
Dept: CARDIOLOGY | Age: 85
End: 2020-03-30

## 2020-03-30 ENCOUNTER — APPOINTMENT (OUTPATIENT)
Dept: CARDIOLOGY | Age: 85
End: 2020-03-30

## 2020-03-30 ENCOUNTER — MED REC SCAN ONLY (OUTPATIENT)
Dept: FAMILY MEDICINE CLINIC | Facility: CLINIC | Age: 85
End: 2020-03-30

## 2020-03-30 VITALS — WEIGHT: 200 LBS | BODY MASS INDEX: 36.8 KG/M2 | HEIGHT: 62 IN

## 2020-03-30 DIAGNOSIS — J10.1 H1N1 INFLUENZA: ICD-10-CM

## 2020-03-30 DIAGNOSIS — I48.0 PAROXYSMAL ATRIAL FIBRILLATION (CMD): Primary | ICD-10-CM

## 2020-03-30 DIAGNOSIS — R06.09 DYSPNEA ON EXERTION: ICD-10-CM

## 2020-03-30 DIAGNOSIS — I10 ESSENTIAL HYPERTENSION: ICD-10-CM

## 2020-03-30 PROBLEM — J18.9 PNEUMONIA: Status: ACTIVE | Noted: 2017-08-12

## 2020-03-30 PROBLEM — D69.6 THROMBOCYTOPENIA (CMD): Status: ACTIVE | Noted: 2020-03-20

## 2020-03-30 PROCEDURE — 99442 TELEPHONE E&M BY PHYSICIAN EST PT NOT ORIG PREV 7 DAYS 11-20 MIN: CPT | Performed by: CLINICAL NURSE SPECIALIST

## 2020-03-30 RX ORDER — RIVAROXABAN 15 MG/1
15 TABLET, FILM COATED ORAL
COMMUNITY
Start: 2020-03-23 | End: 2021-04-19 | Stop reason: ALTCHOICE

## 2020-03-30 RX ORDER — CARVEDILOL 12.5 MG/1
12.5 TABLET ORAL 2 TIMES DAILY WITH MEALS
COMMUNITY

## 2020-03-30 SDOH — HEALTH STABILITY: MENTAL HEALTH: HOW OFTEN DO YOU HAVE A DRINK CONTAINING ALCOHOL?: NEVER

## 2020-03-30 ASSESSMENT — ENCOUNTER SYMPTOMS
COUGH: 0
CHILLS: 0
ALLERGIC/IMMUNOLOGIC COMMENTS: NO NEW FOOD ALLERGIES
HEMATOCHEZIA: 0
BRUISES/BLEEDS EASILY: 0
FEVER: 0
SUSPICIOUS LESIONS: 0
WEIGHT GAIN: 0
WEIGHT LOSS: 0
HEMOPTYSIS: 0
SHORTNESS OF BREATH: 1

## 2020-04-21 RX ORDER — LISINOPRIL 10 MG/1
TABLET ORAL
Qty: 90 TABLET | Refills: 3 | Status: SHIPPED | OUTPATIENT
Start: 2020-04-21 | End: 2021-02-01 | Stop reason: CLARIF

## 2020-04-21 RX ORDER — DRONEDARONE 400 MG/1
TABLET, FILM COATED ORAL
Qty: 180 TABLET | Refills: 3 | OUTPATIENT
Start: 2020-04-21

## 2020-04-29 ENCOUNTER — TELEPHONE (OUTPATIENT)
Dept: INTERNAL MEDICINE CLINIC | Facility: CLINIC | Age: 85
End: 2020-04-29

## 2020-04-29 DIAGNOSIS — Z20.822 ENCOUNTER FOR LABORATORY TESTING FOR COVID-19 VIRUS: Primary | ICD-10-CM

## 2020-04-29 NOTE — TELEPHONE ENCOUNTER
Pt daughter called stating [facility] called her and asked her to call PCP to get an order for COVID 19 faxed to them for the patient to have-daughter Padma Carbajal not sure why they want the order but asked that we fax it to [phone]

## 2020-04-29 NOTE — TELEPHONE ENCOUNTER
Bonita at [facility] indicates pt's will be tested and stay in their own environments while awaiting results. Faxed order and signed paperwork to [facility] at [phone]. Confirmation received.

## 2020-04-29 NOTE — TELEPHONE ENCOUNTER
Spoke to [employer] at [facility], pt is in the Supportive Living area of [facility], pt is asymptomatic, [facility] as a whole are trying to be proactive and testing all residents due to a couple positive COVID 19 cases in a different building, they have

## 2020-05-04 RX ORDER — RIVAROXABAN 15 MG/1
TABLET, FILM COATED ORAL
Qty: 30 TABLET | Refills: 0 | Status: SHIPPED | OUTPATIENT
Start: 2020-05-04 | End: 2020-05-28

## 2020-05-04 NOTE — TELEPHONE ENCOUNTER
Last OV: 2/19/20 f/u BP    Upcoming OV: no upcoming appt     Latest labs: 11/5/19 Microalb, CMP, Lipid and A1c    Latest RX: XARELTO 15 MG TABLET 30 tabs 0 refills on 3/22/20    Per protocol, not on protocol. Rx pending.

## 2020-05-06 ENCOUNTER — TELEPHONE (OUTPATIENT)
Dept: INTERNAL MEDICINE CLINIC | Facility: CLINIC | Age: 85
End: 2020-05-06

## 2020-05-06 NOTE — TELEPHONE ENCOUNTER
Received fax [facility] with [id number] and Respiratory Panel test results. Placed in AS bin for review.

## 2020-05-28 ENCOUNTER — TELEPHONE (OUTPATIENT)
Dept: INTERNAL MEDICINE CLINIC | Facility: CLINIC | Age: 85
End: 2020-05-28

## 2020-05-28 RX ORDER — RIVAROXABAN 15 MG/1
TABLET, FILM COATED ORAL
Qty: 30 TABLET | Refills: 4 | Status: SHIPPED | OUTPATIENT
Start: 2020-05-28 | End: 2020-07-30

## 2020-05-28 NOTE — TELEPHONE ENCOUNTER
LOV:7-    LAST LAB:3-20-20    LAST RX :    Albuterol Sulfate HFA (PROAIR HFA) 108 (90 Base) MCG/ACT Inhalation Aero Soln 1 Inhaler 0refill 4/30/2019    Sig:   Inhale 2 puffs into the lungs every 6 (six) hours as needed.          Next OV :  Future Navi

## 2020-05-28 NOTE — TELEPHONE ENCOUNTER
Received fax from [facility] that requires MD signature. Placed in AS bin for review.

## 2020-05-28 NOTE — TELEPHONE ENCOUNTER
Last OV: 2/19/20 BP f/u    Future Appointments   Date Time Provider Sonal Ferguson   6/24/2020 11:40 AM Andres Bullock MD [facility]  [facility]        Latest labs: 11/5/19 Microalb, CMP, Lipid and A1C    Latest RX: VENTOLIN HFA 90 MCG INHALER refil

## 2020-05-28 NOTE — TELEPHONE ENCOUNTER
Last VISIT 2/19/20 AS HTN    Last REFILL 5/4/20 Xarelto 15 30T 0R    Last LABS 3/22/20 Potassium, 3/21/20 CBC,  BMP    Future Appointments   Date Time Provider Sonal Ferguson   6/24/2020 11:40 AM Andres Rojas MD [facility]  SPAL         Per

## 2020-06-04 RX ORDER — ALBUTEROL SULFATE 2.5 MG/3ML
SOLUTION RESPIRATORY (INHALATION)
Qty: 150 ML | Refills: 0 | OUTPATIENT
Start: 2020-06-04

## 2020-06-05 RX ORDER — ALBUTEROL SULFATE 2.5 MG/3ML
SOLUTION RESPIRATORY (INHALATION)
Qty: 150 ML | Refills: 0 | Status: SHIPPED | OUTPATIENT
Start: 2020-06-05 | End: 2020-08-03

## 2020-06-05 NOTE — TELEPHONE ENCOUNTER
Last Ov: 2/19/20, AS, HTN f/u  Last labs: Potassium 3/22/20  Last Rx: albuterol sulfate 2.5mg/3mL, 120mL, 3R 4/30/19    Future Appointments   Date Time Provider Sonal Tiptoni   6/24/2020 11:40 AM Andres Serra MD [facility]  SP[facility]       Per P

## 2020-06-09 ENCOUNTER — PATIENT MESSAGE (OUTPATIENT)
Dept: INTERNAL MEDICINE CLINIC | Facility: CLINIC | Age: 85
End: 2020-06-09

## 2020-06-09 DIAGNOSIS — R82.90 BAD ODOR OF URINE: ICD-10-CM

## 2020-06-09 DIAGNOSIS — R35.0 URINARY FREQUENCY: Primary | ICD-10-CM

## 2020-06-09 RX ORDER — CEFUROXIME AXETIL 500 MG/1
500 TABLET ORAL 2 TIMES DAILY
Qty: 14 TABLET | Refills: 0 | Status: SHIPPED | OUTPATIENT
Start: 2020-06-09 | End: 2020-12-28

## 2020-06-09 NOTE — TELEPHONE ENCOUNTER
Daughter advised of AS instructions of any worsening s/s to call office for OV at that time. Daughter verbalized understanding of BID medication x 7 days. Daughter denies questions or concerns.

## 2020-06-09 NOTE — TELEPHONE ENCOUNTER
From: Iliana Rubio  To: Tima Carmichael MD  Sent: 6/9/2020 8:55 AM CDT  Subject: Prescription Question    Pacheco Lowry is my mom, she’s 80. She has recurring UTI’s, she most recently had one when she was hospitalized in March.  She is experiencing the familiar

## 2020-06-09 NOTE — TELEPHONE ENCOUNTER
Patients daughter on hipaa states mother brought up concerns of frequent urination and urine odor. Daughter states patient has hx of UTI's and last treated UTI was 3/20/20 when in hospital with pneumonia.  Patient was treated with ceftin 500 mg BID x 5 days

## 2020-06-22 ENCOUNTER — OFFICE VISIT (OUTPATIENT)
Dept: CARDIOLOGY | Age: 85
End: 2020-06-22

## 2020-06-22 VITALS
DIASTOLIC BLOOD PRESSURE: 68 MMHG | SYSTOLIC BLOOD PRESSURE: 124 MMHG | BODY MASS INDEX: 37.39 KG/M2 | HEART RATE: 84 BPM | WEIGHT: 211 LBS | HEIGHT: 63 IN

## 2020-06-22 DIAGNOSIS — I48.0 PAROXYSMAL ATRIAL FIBRILLATION (CMD): ICD-10-CM

## 2020-06-22 DIAGNOSIS — I10 ESSENTIAL HYPERTENSION: ICD-10-CM

## 2020-06-22 DIAGNOSIS — R06.09 DYSPNEA ON EXERTION: Primary | ICD-10-CM

## 2020-06-22 DIAGNOSIS — I50.22 CHRONIC SYSTOLIC CONGESTIVE HEART FAILURE (CMD): ICD-10-CM

## 2020-06-22 DIAGNOSIS — J10.00 PNEUMONIA DUE TO INFLUENZA A VIRUS, UNSPECIFIED LATERALITY, UNSPECIFIED PART OF LUNG: ICD-10-CM

## 2020-06-22 DIAGNOSIS — J10.1 H1N1 INFLUENZA: ICD-10-CM

## 2020-06-22 PROCEDURE — 99214 OFFICE O/P EST MOD 30 MIN: CPT | Performed by: INTERNAL MEDICINE

## 2020-06-22 SDOH — HEALTH STABILITY: PHYSICAL HEALTH: ON AVERAGE, HOW MANY DAYS PER WEEK DO YOU ENGAGE IN MODERATE TO STRENUOUS EXERCISE (LIKE A BRISK WALK)?: 0 DAYS

## 2020-06-22 SDOH — HEALTH STABILITY: MENTAL HEALTH: HOW OFTEN DO YOU HAVE A DRINK CONTAINING ALCOHOL?: NEVER

## 2020-06-22 SDOH — HEALTH STABILITY: PHYSICAL HEALTH: ON AVERAGE, HOW MANY MINUTES DO YOU ENGAGE IN EXERCISE AT THIS LEVEL?: 0 MIN

## 2020-06-22 ASSESSMENT — ENCOUNTER SYMPTOMS
WEIGHT GAIN: 0
COUGH: 0
CHILLS: 0
HEMATOCHEZIA: 0
ALLERGIC/IMMUNOLOGIC COMMENTS: NO NEW FOOD ALLERGIES
SUSPICIOUS LESIONS: 0
FEVER: 0
HEMOPTYSIS: 0
SHORTNESS OF BREATH: 1
WEIGHT LOSS: 0
BRUISES/BLEEDS EASILY: 0

## 2020-06-26 ENCOUNTER — TELEPHONE (OUTPATIENT)
Dept: INTERNAL MEDICINE CLINIC | Facility: CLINIC | Age: 85
End: 2020-06-26

## 2020-06-26 DIAGNOSIS — M25.511 RIGHT SHOULDER PAIN, UNSPECIFIED CHRONICITY: Primary | ICD-10-CM

## 2020-06-26 NOTE — TELEPHONE ENCOUNTER
Pt stated her right shoulder freezes up on her and it has been on and off for about a 1yr. Pt stated she would like physical therapy and is a resident at [facility]. Please advise.

## 2020-06-26 NOTE — TELEPHONE ENCOUNTER
GXP-9/89/9926  A/P:     Essential hypertension  (primary encounter diagnosis)  Chronic congestive heart failure, unspecified heart failure type (hcc)  Chronic obstructive pulmonary disease, unspecified copd type (hcc)  Stable chronic issues, continue meds

## 2020-06-29 NOTE — TELEPHONE ENCOUNTER
Spoke with patient notified PT order placed, will fax to [phone] as requested by patient. Patient verbalized understanding and agreeable to POC.

## 2020-07-23 ENCOUNTER — TELEPHONE (OUTPATIENT)
Dept: INTERNAL MEDICINE CLINIC | Facility: CLINIC | Age: 85
End: 2020-07-23

## 2020-07-23 RX ORDER — DRONEDARONE 400 MG/1
TABLET, FILM COATED ORAL
Qty: 180 TABLET | Refills: 11 | OUTPATIENT
Start: 2020-07-23

## 2020-07-23 RX ORDER — FLUTICASONE PROPIONATE AND SALMETEROL 250; 50 UG/1; UG/1
POWDER RESPIRATORY (INHALATION)
Qty: 1 PACKAGE | Refills: 11 | Status: SHIPPED | OUTPATIENT
Start: 2020-07-23 | End: 2021-02-01 | Stop reason: CLARIF

## 2020-07-23 NOTE — TELEPHONE ENCOUNTER
Last OV 2.19.20 w/ AS (HTN)  Last PE No recent PE on file   Last REFILL No recent refill on file   Last LABS 11.5.19 Microalb, CMP, Lipid, HgA1c    Future Appointments   Date Time Provider [facility]   10/21/2020  1:40 PM Lc Locke MD SP

## 2020-07-23 NOTE — TELEPHONE ENCOUNTER
LOV:  7/31/2019     LAB:    3/20/20    LRX:    fluticasone- 1 Package 11 7/12/2019       NOV:     Future Appointments   Date Time Provider Sonal Ferguson   10/21/2020  1:40 PM Rafaela Campos IV, MD [facility]  [facility]       PROTOCOL:    Asthma & COPD

## 2020-07-27 RX ORDER — DRONEDARONE 400 MG/1
TABLET, FILM COATED ORAL
Qty: 180 TABLET | Refills: 0 | OUTPATIENT
Start: 2020-07-27

## 2020-07-27 NOTE — TELEPHONE ENCOUNTER
LOV: 01/07/20 - Dr. Paul [place] LAB: 3/22/20    LAST RX: 8/10/18    Next OV:   Future Appointments   Date Time Provider Sonal Ferguson   10/21/2020  1:40 PM Sal Campos IV, MD [facility]  [facility]       PROTOCOL: n/a      Pt is now seeing

## 2020-07-30 RX ORDER — RIVAROXABAN 15 MG/1
TABLET, FILM COATED ORAL
Qty: 30 TABLET | Refills: 0 | Status: SHIPPED | OUTPATIENT
Start: 2020-07-30 | End: 2020-11-10

## 2020-07-30 NOTE — TELEPHONE ENCOUNTER
[facility] calling about this rx-are we going to fill it?  Please call them [phone]

## 2020-07-30 NOTE — TELEPHONE ENCOUNTER
Last OV 2.19.20 w/ AS (HTN)  Last PE No recent PE on file   Last REFILL 5.28.20 Xarelto 15mg #30 4R  Last LABS 11.5.19 HgA1c, Lipid, CMP, Microalb    Future Appointments   Date Time Provider Sonal Ferguson   8/3/2020  1:00 PM Pa Gutierrez MD [facility]

## 2020-08-03 ENCOUNTER — OFFICE VISIT (OUTPATIENT)
Dept: INTERNAL MEDICINE CLINIC | Facility: CLINIC | Age: 85
End: 2020-08-03
Payer: MEDICARE

## 2020-08-03 VITALS
RESPIRATION RATE: 16 BRPM | BODY MASS INDEX: 36.86 KG/M2 | HEIGHT: 63 IN | TEMPERATURE: 97 F | HEART RATE: 80 BPM | SYSTOLIC BLOOD PRESSURE: 106 MMHG | DIASTOLIC BLOOD PRESSURE: 66 MMHG | WEIGHT: 208 LBS

## 2020-08-03 DIAGNOSIS — N39.0 RECURRENT UTI: ICD-10-CM

## 2020-08-03 DIAGNOSIS — M75.01 ADHESIVE CAPSULITIS OF RIGHT SHOULDER: ICD-10-CM

## 2020-08-03 DIAGNOSIS — Z00.00 ENCOUNTER FOR ANNUAL HEALTH EXAMINATION: Primary | ICD-10-CM

## 2020-08-03 DIAGNOSIS — I48.0 PAROXYSMAL ATRIAL FIBRILLATION (HCC): ICD-10-CM

## 2020-08-03 DIAGNOSIS — I50.9 CHRONIC CONGESTIVE HEART FAILURE, UNSPECIFIED HEART FAILURE TYPE (HCC): ICD-10-CM

## 2020-08-03 DIAGNOSIS — K11.8 PAROTID MASS: ICD-10-CM

## 2020-08-03 DIAGNOSIS — I10 ESSENTIAL HYPERTENSION: ICD-10-CM

## 2020-08-03 DIAGNOSIS — R73.03 PRE-DIABETES: ICD-10-CM

## 2020-08-03 DIAGNOSIS — D69.6 THROMBOCYTOPENIA (HCC): ICD-10-CM

## 2020-08-03 DIAGNOSIS — J43.8 OTHER EMPHYSEMA (HCC): ICD-10-CM

## 2020-08-03 PROBLEM — J12.9 VIRAL PNEUMONIA: Status: RESOLVED | Noted: 2020-03-20 | Resolved: 2020-08-03

## 2020-08-03 PROBLEM — R06.09 DYSPNEA ON EXERTION: Status: RESOLVED | Noted: 2020-03-20 | Resolved: 2020-08-03

## 2020-08-03 PROBLEM — J44.1 COPD EXACERBATION (HCC): Status: RESOLVED | Noted: 2020-01-07 | Resolved: 2020-08-03

## 2020-08-03 PROBLEM — R06.00 DYSPNEA ON EXERTION: Status: RESOLVED | Noted: 2020-03-20 | Resolved: 2020-08-03

## 2020-08-03 PROCEDURE — G0439 PPPS, SUBSEQ VISIT: HCPCS | Performed by: INTERNAL MEDICINE

## 2020-08-03 PROCEDURE — 99213 OFFICE O/P EST LOW 20 MIN: CPT | Performed by: INTERNAL MEDICINE

## 2020-08-03 NOTE — PATIENT INSTRUCTIONS
Peyton Chatterjee's SCREENING SCHEDULE   Tests on this list are recommended by your physician but may not be covered, or covered at this frequency, by your insurer. Please check with your insurance carrier before scheduling to verify coverage.    PREVENTATI Limited to patients who meet one of the following criteria:   • Men who are 73-68 years old and have smoked more than 100 cigarettes in their lifetime   • Anyone with a family history    Colorectal Cancer Screening  Covered up to Age 76     Colonoscopy Scr patient.  Please get this Mammogram regularly   Immunizations      Influenza  Covered Annually Orders placed or performed in visit on 10/07/19   • FLU VACC HIGH DOSE PRSV FREE    Please get every year    Pneumococcal 13 (Prevnar)  Covered Once after 65 Orde available in [address])  www. [email or web address]. org  This link also has information from the [address] [place] [address] regarding Advance Directives.

## 2020-08-03 NOTE — PROGRESS NOTES
HPI:   Laly Lundy is a 80year old female who presents for a Medicare Subsequent Annual Wellness visit (Pt already had Initial Annual Wellness). Here for awv.  Pt has stable chronic recurrent uit, no symptoms currently, copd, htn, pre diabetes, af patient has this document but we do not have it in [employer], and patient is instructed to get our office a copy of it for scanning into Epic. She smoked tobacco in the past but quit greater than 12 months ago.   Social History    Tobacco Use      Smok Powder, Breath Activated, INHALE 1 PUFF BY MOUTH INTO THE LUNGS EVERY 12 HOURS  VENTOLIN  (90 Base) MCG/ACT Inhalation Aero Soln, INHALE TWO PUFFS INTO THE LUNGS EVERY SIX HOURS AS NEEDED  LISINOPRIL 10 MG Oral Tab, TAKE ONE TABLET BY MOUTH EVERY EV pneumonia (3/20/2020).     She  has a past surgical history that includes appendectomy; total abdom hysterectomy; colonoscopy; laparoscopic cholecystectomy; Umbilical hernia repair (3/31/14); injection, w/wo contrast, dx/therapeutic substance, epidural/suba CAD in her brother; Cancer in her father; Cancer (age of onset: 44) in her daughter; Heart Attack in her brother; Hypertension in her brother; Ovarian Cancer in her mother; Prostate Cancer in her father.    SOCIAL HISTORY:   She  reports that she quit iloho symmetric   Skin: Skin color, texture, turgor normal, no rashes or lesions   Lymph nodes: Cervical, supraclavicular, and axillary nodes normal   Neurologic: Normal       Vaccination History     Immunization History   Administered Date(s) Administered   • F at [facility]  Encounter for annual health examination         Diet assessment: good     PLAN:  The patient indicates understanding of these issues and agrees to the plan. Reinforced healthy diet, lifestyle, and exercise.     Return in 6 months (on 2/3/20 Eye Exam 12/16/2019       Bone Density Screening      Dexascan Every two years No results found for this or any previous visit. No flowsheet data found.     Pap and Pelvic      Pap: Every 3 yrs age 21-65 or Pap+HPV every 5 yrs age 33-67, age 72 and older at POTASSIUM (mmol/L)   Date Value   01/20/2014 4.1    No flowsheet data found. Creatinine  Annually CREATININE (mg/dL)   Date Value   01/20/2014 0.76     Creatinine (mg/dL)   Date Value   03/21/2020 0.96    No flowsheet data found.     Drug Serum Conc

## 2020-08-04 ENCOUNTER — NURSE ONLY (OUTPATIENT)
Dept: LAB | Age: 85
End: 2020-08-04
Attending: INTERNAL MEDICINE
Payer: MEDICARE

## 2020-08-04 DIAGNOSIS — D69.6 THROMBOCYTOPENIA (HCC): ICD-10-CM

## 2020-08-04 DIAGNOSIS — R73.03 PRE-DIABETES: ICD-10-CM

## 2020-08-04 DIAGNOSIS — R71.8 ELEVATED MCV: ICD-10-CM

## 2020-08-04 DIAGNOSIS — I10 ESSENTIAL HYPERTENSION: ICD-10-CM

## 2020-08-04 LAB
ALBUMIN SERPL-MCNC: 2.8 G/DL (ref 3.4–5)
ALBUMIN/GLOB SERPL: 0.8 {RATIO} (ref 1–2)
ALP LIVER SERPL-CCNC: 75 U/L (ref 55–142)
ALT SERPL-CCNC: 14 U/L (ref 13–56)
ANION GAP SERPL CALC-SCNC: 5 MMOL/L (ref 0–18)
AST SERPL-CCNC: 18 U/L (ref 15–37)
BASOPHILS # BLD AUTO: 0.02 X10(3) UL (ref 0–0.2)
BASOPHILS NFR BLD AUTO: 0.3 %
BILIRUB SERPL-MCNC: 0.4 MG/DL (ref 0.1–2)
BUN BLD-MCNC: 26 MG/DL (ref 7–18)
BUN/CREAT SERPL: 24.8 (ref 10–20)
CALCIUM BLD-MCNC: 9.4 MG/DL (ref 8.5–10.1)
CHLORIDE SERPL-SCNC: 108 MMOL/L (ref 98–112)
CHOLEST SMN-MCNC: 112 MG/DL (ref ?–200)
CO2 SERPL-SCNC: 28 MMOL/L (ref 21–32)
CREAT BLD-MCNC: 1.05 MG/DL (ref 0.55–1.02)
DEPRECATED RDW RBC AUTO: 52.1 FL (ref 35.1–46.3)
EOSINOPHIL # BLD AUTO: 0.31 X10(3) UL (ref 0–0.7)
EOSINOPHIL NFR BLD AUTO: 4.4 %
ERYTHROCYTE [DISTWIDTH] IN BLOOD BY AUTOMATED COUNT: 13.6 % (ref 11–15)
EST. AVERAGE GLUCOSE BLD GHB EST-MCNC: 123 MG/DL (ref 68–126)
GLOBULIN PLAS-MCNC: 3.6 G/DL (ref 2.8–4.4)
GLUCOSE BLD-MCNC: 94 MG/DL (ref 70–99)
HBA1C MFR BLD HPLC: 5.9 % (ref ?–5.7)
HCT VFR BLD AUTO: 40.8 % (ref 35–48)
HDLC SERPL-MCNC: 37 MG/DL (ref 40–59)
HGB BLD-MCNC: 12.8 G/DL (ref 12–16)
IMM GRANULOCYTES # BLD AUTO: 0.03 X10(3) UL (ref 0–1)
IMM GRANULOCYTES NFR BLD: 0.4 %
LDLC SERPL CALC-MCNC: 61 MG/DL (ref ?–100)
LYMPHOCYTES # BLD AUTO: 1.49 X10(3) UL (ref 1–4)
LYMPHOCYTES NFR BLD AUTO: 21.1 %
M PROTEIN MFR SERPL ELPH: 6.4 G/DL (ref 6.4–8.2)
MCH RBC QN AUTO: 32.7 PG (ref 26–34)
MCHC RBC AUTO-ENTMCNC: 31.4 G/DL (ref 31–37)
MCV RBC AUTO: 104.1 FL (ref 80–100)
MONOCYTES # BLD AUTO: 0.76 X10(3) UL (ref 0.1–1)
MONOCYTES NFR BLD AUTO: 10.8 %
NEUTROPHILS # BLD AUTO: 4.45 X10 (3) UL (ref 1.5–7.7)
NEUTROPHILS # BLD AUTO: 4.45 X10(3) UL (ref 1.5–7.7)
NEUTROPHILS NFR BLD AUTO: 63 %
NONHDLC SERPL-MCNC: 75 MG/DL (ref ?–130)
OSMOLALITY SERPL CALC.SUM OF ELEC: 297 MOSM/KG (ref 275–295)
PATIENT FASTING Y/N/NP: YES
PATIENT FASTING Y/N/NP: YES
PLATELET # BLD AUTO: 157 10(3)UL (ref 150–450)
POTASSIUM SERPL-SCNC: 4 MMOL/L (ref 3.5–5.1)
RBC # BLD AUTO: 3.92 X10(6)UL (ref 3.8–5.3)
SODIUM SERPL-SCNC: 141 MMOL/L (ref 136–145)
T4 FREE SERPL-MCNC: 1.2 NG/DL (ref 0.8–1.7)
TRIGL SERPL-MCNC: 72 MG/DL (ref 30–149)
TSI SER-ACNC: 4.74 MIU/ML (ref 0.36–3.74)
VIT B12 SERPL-MCNC: 324 PG/ML (ref 193–986)
VLDLC SERPL CALC-MCNC: 14 MG/DL (ref 0–30)
WBC # BLD AUTO: 7.1 X10(3) UL (ref 4–11)

## 2020-08-04 PROCEDURE — 83036 HEMOGLOBIN GLYCOSYLATED A1C: CPT

## 2020-08-04 PROCEDURE — 80061 LIPID PANEL: CPT

## 2020-08-04 PROCEDURE — 84439 ASSAY OF FREE THYROXINE: CPT

## 2020-08-04 PROCEDURE — 85025 COMPLETE CBC W/AUTO DIFF WBC: CPT

## 2020-08-04 PROCEDURE — 84443 ASSAY THYROID STIM HORMONE: CPT

## 2020-08-04 PROCEDURE — 82607 VITAMIN B-12: CPT

## 2020-08-04 PROCEDURE — 36415 COLL VENOUS BLD VENIPUNCTURE: CPT

## 2020-08-04 PROCEDURE — 80053 COMPREHEN METABOLIC PANEL: CPT

## 2020-08-06 ENCOUNTER — TELEPHONE (OUTPATIENT)
Dept: INTERNAL MEDICINE CLINIC | Facility: CLINIC | Age: 85
End: 2020-08-06

## 2020-08-06 NOTE — TELEPHONE ENCOUNTER
Received coordination of care document from [facility] 8/4/20. Document placed in AS bin for review. FYI document no barcode needed.

## 2020-08-10 ENCOUNTER — TELEPHONE (OUTPATIENT)
Dept: INTERNAL MEDICINE CLINIC | Facility: CLINIC | Age: 85
End: 2020-08-10

## 2020-08-10 NOTE — TELEPHONE ENCOUNTER
Received PT Plan of Care Order #[id number] from [address] [place] [address]. Requires MD signature. Placed order on provider AS desk for review/signature.

## 2020-08-19 ENCOUNTER — TELEPHONE (OUTPATIENT)
Dept: INTERNAL MEDICINE CLINIC | Facility: CLINIC | Age: 85
End: 2020-08-19

## 2020-08-28 ENCOUNTER — TELEPHONE (OUTPATIENT)
Dept: INTERNAL MEDICINE CLINIC | Facility: CLINIC | Age: 85
End: 2020-08-28

## 2020-08-28 NOTE — TELEPHONE ENCOUNTER
Fax received from [facility] home health form. Needs to be reviewed and signed by AS. F: [phone]    Also needs last encounter sent.

## 2020-08-31 ENCOUNTER — TELEPHONE (OUTPATIENT)
Dept: INTERNAL MEDICINE CLINIC | Facility: CLINIC | Age: 85
End: 2020-08-31

## 2020-09-02 NOTE — TELEPHONE ENCOUNTER
Orders signed and per [facility] comment, forms faxed back to [phone]. Confirmation received and forms sent to scan.

## 2020-09-09 RX ORDER — ALBUTEROL SULFATE 2.5 MG/3ML
SOLUTION RESPIRATORY (INHALATION)
Qty: 150 ML | Refills: 11 | Status: SHIPPED | OUTPATIENT
Start: 2020-09-09 | End: 2021-02-01 | Stop reason: CLARIF

## 2020-09-09 NOTE — TELEPHONE ENCOUNTER
Last OV: 8/3/20 physical    Future Appointments   Date Time Provider Sonal Phyllis   10/21/2020  1:40 PM Andres MG, Deshaun Rojo MD [facility]  [facility]        Latest labs: 8/4/20 CBC, CMP, Lipid, A1c, TSH w/ref, T4 and Vit B12    Latest RX: ALBUTEROL SUL

## 2020-09-14 NOTE — TELEPHONE ENCOUNTER
Last OV 8.3.20 w/ AS (annual pe)   Last PE 8. 3.20   Last REFILL 5.28.20 VENTOLIN  (90 Base) MCG/ACT #18g 0R  Last LABS 8.4.20 CBC, CMP, Lipid, HgA1c, TSH w/reflex, T4Free, VitB12     Future Appointments   Date Time Provider Sonal Ferguson   10/21

## 2020-11-05 ENCOUNTER — HOSPITAL ENCOUNTER (OUTPATIENT)
Dept: GENERAL RADIOLOGY | Facility: HOSPITAL | Age: 85
Discharge: HOME | End: 2020-11-05
Attending: FAMILY MEDICINE
Payer: MEDICARE

## 2020-11-05 ENCOUNTER — TELEPHONE (OUTPATIENT)
Dept: INTERNAL MEDICINE CLINIC | Facility: CLINIC | Age: 85
End: 2020-11-05

## 2020-11-05 ENCOUNTER — OFFICE VISIT (OUTPATIENT)
Dept: INTERNAL MEDICINE CLINIC | Facility: CLINIC | Age: 85
End: 2020-11-05
Payer: MEDICARE

## 2020-11-05 VITALS
DIASTOLIC BLOOD PRESSURE: 70 MMHG | HEART RATE: 88 BPM | TEMPERATURE: 99 F | BODY MASS INDEX: 38 KG/M2 | HEIGHT: 63 IN | RESPIRATION RATE: 18 BRPM | SYSTOLIC BLOOD PRESSURE: 124 MMHG

## 2020-11-05 DIAGNOSIS — M79.89 SWELLING OF LEFT LOWER EXTREMITY: ICD-10-CM

## 2020-11-05 DIAGNOSIS — I50.9 CHRONIC CONGESTIVE HEART FAILURE, UNSPECIFIED HEART FAILURE TYPE (HCC): ICD-10-CM

## 2020-11-05 DIAGNOSIS — M79.671 RIGHT FOOT PAIN: ICD-10-CM

## 2020-11-05 DIAGNOSIS — M79.671 RIGHT FOOT PAIN: Primary | ICD-10-CM

## 2020-11-05 DIAGNOSIS — I48.0 PAROXYSMAL ATRIAL FIBRILLATION (HCC): ICD-10-CM

## 2020-11-05 PROCEDURE — 99214 OFFICE O/P EST MOD 30 MIN: CPT | Performed by: FAMILY MEDICINE

## 2020-11-05 PROCEDURE — 73630 X-RAY EXAM OF FOOT: CPT | Performed by: FAMILY MEDICINE

## 2020-11-05 NOTE — TELEPHONE ENCOUNTER
Pt's dtr Shannan Roldan indicates pt lives at [facility], has been quarantined, experiencing left great toe swollen and painful, never has been dx with gout, no injury. Appt scheduled with [facility] for today 11/5/20 at 3:30pm.  Shannan Roldan verbalizes understanding.  FYI to [facility]

## 2020-11-05 NOTE — TELEPHONE ENCOUNTER
Pts daughter called and stated the pt has been c/o what she thinks is gout.      Pts daughter would like to know if she needs to be seen here or UC     Please advise

## 2020-11-05 NOTE — PROGRESS NOTES
Dayanara Fink  11/25/1933    Patient presents with:  Leg Swelling: MR rm 9 foot and leg swelling x4 days, pt states that it is painful to walk, pt states she \"feels off\"       HPI:   Dayanara Fink is a 80year old female who presents with L foot pain mouth daily. • Probiotic Product (ALIGN) Oral Cap Take 1 capsule by mouth daily.      • Ferrous Sulfate 325 (65 Fe) MG Oral Tab Take 325 mg by mouth daily with breakfast.   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 Donna Orantes   • Venous insufficiency    • Ventral hernia 8/11/2014   • Viral pneumonia 3/20/2020      Patient Active Problem List:     HTN (hypertension)     Atrial fibrillation (HCC)     CHF (congestive heart failure) (H fever or chills  EYES:denies vision changes  HEENT: denies congestion  LUNGS: denies new or worsening shortness of breath  CARDIOVASCULAR: denies chest pain on exertion  GI: no nausea or abdominal pain  NEURO: denies headaches    EXAM:   /70 (BP Loca and elevation. Follow-up will be pending the results of her x-ray. 1. Right foot pain  - XR FOOT, COMPLETE (MIN 3 VIEWS), LEFT (CPT=73630); Future    2. Swelling of left lower extremity  - COMP METABOLIC PANEL (14); Future    3.  Chronic congestive hear

## 2020-11-10 ENCOUNTER — NURSE ONLY (OUTPATIENT)
Dept: LAB | Age: 85
End: 2020-11-10
Attending: FAMILY MEDICINE
Payer: MEDICARE

## 2020-11-10 DIAGNOSIS — M79.89 SWELLING OF LEFT LOWER EXTREMITY: ICD-10-CM

## 2020-11-10 DIAGNOSIS — R79.89 ABNORMAL TSH: ICD-10-CM

## 2020-11-10 DIAGNOSIS — E03.9 HYPOTHYROIDISM, UNSPECIFIED TYPE: Primary | ICD-10-CM

## 2020-11-10 DIAGNOSIS — E03.9 HYPOTHYROIDISM, UNSPECIFIED TYPE: ICD-10-CM

## 2020-11-10 PROCEDURE — 36415 COLL VENOUS BLD VENIPUNCTURE: CPT

## 2020-11-10 PROCEDURE — 84439 ASSAY OF FREE THYROXINE: CPT

## 2020-11-10 PROCEDURE — 80053 COMPREHEN METABOLIC PANEL: CPT

## 2020-11-10 PROCEDURE — 84443 ASSAY THYROID STIM HORMONE: CPT

## 2020-11-10 NOTE — TELEPHONE ENCOUNTER
Last VISIT 11/05/20    Last REFILL 07/30/20 qty 30 w/0 refills    Last LABS CMP, TSH, T4 done 11/10/20    No future appointments. Per PROTOCOL? Not on protocol      Please Approve or Deny.

## 2020-11-11 ENCOUNTER — TELEPHONE (OUTPATIENT)
Dept: INTERNAL MEDICINE CLINIC | Facility: CLINIC | Age: 85
End: 2020-11-11

## 2020-11-11 RX ORDER — RIVAROXABAN 15 MG/1
TABLET, FILM COATED ORAL
Qty: 90 TABLET | Refills: 1 | Status: ON HOLD | OUTPATIENT
Start: 2020-11-11 | End: 2021-01-25

## 2020-11-11 NOTE — TELEPHONE ENCOUNTER
Received fax w/ lab results an orders for AS to sign, placed in AS bin for review, lolab results abstracted in chart.

## 2020-12-10 ENCOUNTER — TELEPHONE (OUTPATIENT)
Dept: CARDIOLOGY | Age: 85
End: 2020-12-10

## 2020-12-18 RX ORDER — POTASSIUM CHLORIDE 750 MG/1
TABLET, FILM COATED, EXTENDED RELEASE ORAL
Qty: 30 TABLET | Refills: 0 | Status: SHIPPED | OUTPATIENT
Start: 2020-12-18 | End: 2021-01-19

## 2020-12-18 NOTE — TELEPHONE ENCOUNTER
Last OV 11.5.20 w/ MK (acute)   Last PE 8. 3.20   Last REFILL No recent refill on file (listed as external/pt reported)   Last LABS 11.10.20 T4Free, TSH w/reflex, CMP    No future appointments. Per PROTOCOL?  Not on protocol     Please Advise

## 2020-12-28 ENCOUNTER — TELEPHONE (OUTPATIENT)
Dept: INTERNAL MEDICINE CLINIC | Facility: CLINIC | Age: 85
End: 2020-12-28

## 2020-12-28 DIAGNOSIS — R82.90 BAD ODOR OF URINE: ICD-10-CM

## 2020-12-28 DIAGNOSIS — R35.0 URINARY FREQUENCY: ICD-10-CM

## 2020-12-28 RX ORDER — CEFUROXIME AXETIL 500 MG/1
500 TABLET ORAL 2 TIMES DAILY
Qty: 14 TABLET | Refills: 0 | Status: SHIPPED | OUTPATIENT
Start: 2020-12-28 | End: 2021-01-04

## 2020-12-28 NOTE — TELEPHONE ENCOUNTER
Pt is in [facility] and stated her mother is complaining of a UTI. In the past we have just called something in for her. She would like the same to happen this time as well.  Please advise

## 2020-12-28 NOTE — TELEPHONE ENCOUNTER
Rx sent to [facility] in [place], confirmed pharm per daughter.      Daughter aware of recs if symptoms persist.

## 2021-01-04 ENCOUNTER — TELEPHONE (OUTPATIENT)
Dept: INTERNAL MEDICINE CLINIC | Facility: CLINIC | Age: 86
End: 2021-01-04

## 2021-01-04 NOTE — TELEPHONE ENCOUNTER
Thalia Lambert, pt's dtr indicates pt has chronic UTI's, last UTI was June or July.   Pt had an episode of a gush of blood in the toilet on 12/31, not rectal bleeding, bright red blood in the toilet, second episode today 1/4/21, frequency of urination, streaks of bl

## 2021-01-04 NOTE — TELEPHONE ENCOUNTER
Pts daughter Radha Aquino called and stated that she would like a call back to discuss the pt. The pt states that she has been c/o vaginal bleeding.  This is the 2nd bout in 5 days     Please advise

## 2021-01-05 NOTE — TELEPHONE ENCOUNTER
Jemima, pt's dtr notified pt should be taken to the UC for evaluation, possible imaging, labs. Jemima verbalizes understanding.

## 2021-01-19 RX ORDER — FUROSEMIDE 20 MG/1
TABLET ORAL
Qty: 180 TABLET | Refills: 0 | Status: SHIPPED | OUTPATIENT
Start: 2021-01-19 | End: 2021-02-01 | Stop reason: CLARIF

## 2021-01-19 RX ORDER — CARVEDILOL 6.25 MG/1
TABLET ORAL
Qty: 120 TABLET | Refills: 0 | Status: SHIPPED | OUTPATIENT
Start: 2021-01-19 | End: 2021-02-01 | Stop reason: CLARIF

## 2021-01-19 NOTE — TELEPHONE ENCOUNTER
Last VISIT 11/05/20    Last REFILL 12/18/20 qty 30 w/0 refills    Last LABS 11/10/20 CMP, TSH, T4 done    No future appointments. Per PROTOCOL? Not on protocol      Please Approve or Deny.

## 2021-01-20 RX ORDER — POTASSIUM CHLORIDE 750 MG/1
10 TABLET, FILM COATED, EXTENDED RELEASE ORAL DAILY
Qty: 90 TABLET | Refills: 0 | Status: ON HOLD | OUTPATIENT
Start: 2021-01-20 | End: 2021-01-25

## 2021-01-21 ENCOUNTER — NURSE ONLY (OUTPATIENT)
Dept: LAB | Age: 86
End: 2021-01-21
Attending: INTERNAL MEDICINE
Payer: MEDICARE

## 2021-01-21 DIAGNOSIS — N39.0 RECURRENT UTI: ICD-10-CM

## 2021-01-21 DIAGNOSIS — I50.9 CONGESTIVE HEART FAILURE, UNSPECIFIED HF CHRONICITY, UNSPECIFIED HEART FAILURE TYPE (HCC): ICD-10-CM

## 2021-01-21 DIAGNOSIS — I48.91 ATRIAL FIBRILLATION, UNSPECIFIED TYPE (HCC): ICD-10-CM

## 2021-01-21 DIAGNOSIS — I10 HYPERTENSION, UNSPECIFIED TYPE: ICD-10-CM

## 2021-01-21 DIAGNOSIS — D69.6 THROMBOCYTOPENIA (HCC): ICD-10-CM

## 2021-01-21 LAB
ANION GAP SERPL CALC-SCNC: 4 MMOL/L (ref 0–18)
BILIRUB UR QL STRIP.AUTO: NEGATIVE
BUN BLD-MCNC: 24 MG/DL (ref 7–18)
BUN/CREAT SERPL: 21.6 (ref 10–20)
CALCIUM BLD-MCNC: 9.3 MG/DL (ref 8.5–10.1)
CHLORIDE SERPL-SCNC: 109 MMOL/L (ref 98–112)
CO2 SERPL-SCNC: 27 MMOL/L (ref 21–32)
COLOR UR AUTO: YELLOW
CREAT BLD-MCNC: 1.11 MG/DL
DEPRECATED RDW RBC AUTO: 54.2 FL (ref 35.1–46.3)
ERYTHROCYTE [DISTWIDTH] IN BLOOD BY AUTOMATED COUNT: 14.1 % (ref 11–15)
GLUCOSE BLD-MCNC: 83 MG/DL (ref 70–99)
GLUCOSE UR STRIP.AUTO-MCNC: NEGATIVE MG/DL
HCT VFR BLD AUTO: 38.1 %
HGB BLD-MCNC: 12.4 G/DL
KETONES UR STRIP.AUTO-MCNC: NEGATIVE MG/DL
MCH RBC QN AUTO: 34 PG (ref 26–34)
MCHC RBC AUTO-ENTMCNC: 32.5 G/DL (ref 31–37)
MCV RBC AUTO: 104.4 FL
NITRITE UR QL STRIP.AUTO: NEGATIVE
OSMOLALITY SERPL CALC.SUM OF ELEC: 293 MOSM/KG (ref 275–295)
PATIENT FASTING Y/N/NP: YES
PH UR STRIP.AUTO: 9 [PH] (ref 4.5–8)
PLATELET # BLD AUTO: 152 10(3)UL (ref 150–450)
POTASSIUM SERPL-SCNC: 4.5 MMOL/L (ref 3.5–5.1)
PROT UR STRIP.AUTO-MCNC: >=500 MG/DL
RBC # BLD AUTO: 3.65 X10(6)UL
RBC #/AREA URNS AUTO: >10 /HPF
SODIUM SERPL-SCNC: 140 MMOL/L (ref 136–145)
SP GR UR STRIP.AUTO: 1.02 (ref 1–1.03)
TSI SER-ACNC: 3.12 MIU/ML (ref 0.36–3.74)
UROBILINOGEN UR STRIP.AUTO-MCNC: <2 MG/DL
WBC # BLD AUTO: 7.5 X10(3) UL (ref 4–11)

## 2021-01-21 PROCEDURE — 87086 URINE CULTURE/COLONY COUNT: CPT

## 2021-01-21 PROCEDURE — 87186 SC STD MICRODIL/AGAR DIL: CPT

## 2021-01-21 PROCEDURE — 84443 ASSAY THYROID STIM HORMONE: CPT

## 2021-01-21 PROCEDURE — 87077 CULTURE AEROBIC IDENTIFY: CPT

## 2021-01-21 PROCEDURE — 85027 COMPLETE CBC AUTOMATED: CPT

## 2021-01-21 PROCEDURE — 80048 BASIC METABOLIC PNL TOTAL CA: CPT

## 2021-01-21 PROCEDURE — 36415 COLL VENOUS BLD VENIPUNCTURE: CPT

## 2021-01-21 PROCEDURE — 81001 URINALYSIS AUTO W/SCOPE: CPT

## 2021-01-23 ENCOUNTER — APPOINTMENT (OUTPATIENT)
Dept: ULTRASOUND IMAGING | Facility: HOSPITAL | Age: 86
End: 2021-01-23
Attending: EMERGENCY MEDICINE
Payer: MEDICARE

## 2021-01-23 ENCOUNTER — HOSPITAL ENCOUNTER (OUTPATIENT)
Facility: HOSPITAL | Age: 86
Setting detail: OBSERVATION
Discharge: HOME | End: 2021-01-25
Attending: EMERGENCY MEDICINE | Admitting: INTERNAL MEDICINE
Payer: MEDICARE

## 2021-01-23 DIAGNOSIS — R33.9 URINARY RETENTION: ICD-10-CM

## 2021-01-23 DIAGNOSIS — R31.0 GROSS HEMATURIA: Primary | ICD-10-CM

## 2021-01-23 DIAGNOSIS — N30.01 ACUTE CYSTITIS WITH HEMATURIA: ICD-10-CM

## 2021-01-23 LAB
ALBUMIN SERPL-MCNC: 3.1 G/DL (ref 3.4–5)
ALBUMIN/GLOB SERPL: 0.8 {RATIO} (ref 1–2)
ALP LIVER SERPL-CCNC: 90 U/L
ALT SERPL-CCNC: 16 U/L
ANION GAP SERPL CALC-SCNC: 7 MMOL/L (ref 0–18)
AST SERPL-CCNC: 18 U/L (ref 15–37)
BASOPHILS # BLD AUTO: 0.05 X10(3) UL (ref 0–0.2)
BASOPHILS NFR BLD AUTO: 0.5 %
BILIRUB SERPL-MCNC: 0.6 MG/DL (ref 0.1–2)
BILIRUB UR QL STRIP.AUTO: NEGATIVE
BUN BLD-MCNC: 21 MG/DL (ref 7–18)
BUN/CREAT SERPL: 17.8 (ref 10–20)
CALCIUM BLD-MCNC: 9.1 MG/DL (ref 8.5–10.1)
CHLORIDE SERPL-SCNC: 110 MMOL/L (ref 98–112)
CO2 SERPL-SCNC: 22 MMOL/L (ref 21–32)
CREAT BLD-MCNC: 1.18 MG/DL
DEPRECATED RDW RBC AUTO: 53.1 FL (ref 35.1–46.3)
EOSINOPHIL # BLD AUTO: 0.17 X10(3) UL (ref 0–0.7)
EOSINOPHIL NFR BLD AUTO: 1.7 %
ERYTHROCYTE [DISTWIDTH] IN BLOOD BY AUTOMATED COUNT: 14.2 % (ref 11–15)
GLOBULIN PLAS-MCNC: 3.8 G/DL (ref 2.8–4.4)
GLUCOSE BLD-MCNC: 108 MG/DL (ref 70–99)
GLUCOSE UR STRIP.AUTO-MCNC: NEGATIVE MG/DL
HCT VFR BLD AUTO: 39.9 %
HGB BLD-MCNC: 13.2 G/DL
IMM GRANULOCYTES # BLD AUTO: 0.06 X10(3) UL (ref 0–1)
IMM GRANULOCYTES NFR BLD: 0.6 %
LYMPHOCYTES # BLD AUTO: 1.09 X10(3) UL (ref 1–4)
LYMPHOCYTES NFR BLD AUTO: 10.7 %
M PROTEIN MFR SERPL ELPH: 6.9 G/DL (ref 6.4–8.2)
MCH RBC QN AUTO: 33.9 PG (ref 26–34)
MCHC RBC AUTO-ENTMCNC: 33.1 G/DL (ref 31–37)
MCV RBC AUTO: 102.6 FL
MONOCYTES # BLD AUTO: 0.86 X10(3) UL (ref 0.1–1)
MONOCYTES NFR BLD AUTO: 8.4 %
NEUTROPHILS # BLD AUTO: 7.97 X10 (3) UL (ref 1.5–7.7)
NEUTROPHILS # BLD AUTO: 7.97 X10(3) UL (ref 1.5–7.7)
NEUTROPHILS NFR BLD AUTO: 78.1 %
NITRITE UR QL STRIP.AUTO: POSITIVE
OSMOLALITY SERPL CALC.SUM OF ELEC: 292 MOSM/KG (ref 275–295)
PH UR STRIP.AUTO: 9 [PH] (ref 4.5–8)
POTASSIUM SERPL-SCNC: 4.3 MMOL/L (ref 3.5–5.1)
PROT UR STRIP.AUTO-MCNC: 100 MG/DL
RBC # BLD AUTO: 3.89 X10(6)UL
RBC #/AREA URNS AUTO: >10 /HPF
SARS-COV-2 RNA RESP QL NAA+PROBE: NOT DETECTED
SODIUM SERPL-SCNC: 139 MMOL/L (ref 136–145)
SP GR UR STRIP.AUTO: 1.01 (ref 1–1.03)
UROBILINOGEN UR STRIP.AUTO-MCNC: <2 MG/DL
WBC # BLD AUTO: 10.2 X10(3) UL (ref 4–11)

## 2021-01-23 PROCEDURE — 76857 US EXAM PELVIC LIMITED: CPT | Performed by: EMERGENCY MEDICINE

## 2021-01-23 RX ORDER — CARVEDILOL 12.5 MG/1
12.5 TABLET ORAL 2 TIMES DAILY WITH MEALS
Status: DISCONTINUED | OUTPATIENT
Start: 2021-01-23 | End: 2021-01-25

## 2021-01-23 RX ORDER — ALBUTEROL SULFATE 90 UG/1
2 AEROSOL, METERED RESPIRATORY (INHALATION) EVERY 6 HOURS PRN
Status: DISCONTINUED | OUTPATIENT
Start: 2021-01-23 | End: 2021-01-25

## 2021-01-23 RX ORDER — MAGNESIUM HYDROXIDE 1200 MG/15ML
3000 LIQUID ORAL CONTINUOUS
Status: DISCONTINUED | OUTPATIENT
Start: 2021-01-23 | End: 2021-01-25

## 2021-01-23 RX ORDER — LISINOPRIL 10 MG/1
10 TABLET ORAL EVERY EVENING
Status: DISCONTINUED | OUTPATIENT
Start: 2021-01-23 | End: 2021-01-25

## 2021-01-23 RX ORDER — PANTOPRAZOLE SODIUM 20 MG/1
20 TABLET, DELAYED RELEASE ORAL
Refills: 0 | Status: DISCONTINUED | OUTPATIENT
Start: 2021-01-24 | End: 2021-01-25

## 2021-01-23 RX ORDER — LEVOFLOXACIN 5 MG/ML
750 INJECTION, SOLUTION INTRAVENOUS ONCE
Status: COMPLETED | OUTPATIENT
Start: 2021-01-23 | End: 2021-01-23

## 2021-01-23 NOTE — ED INITIAL ASSESSMENT (HPI)
A/o x3, pt ambulatory with walker, reports vaginal bleeding since 2am, bright maroon color, states she has been bleeding on and off since New years, but it stops.  This morning the bleeding did not stop, also reports feeling tired more than before with lowe

## 2021-01-23 NOTE — ED NOTES
Pt soiled, cleaned and made comfortable. 1 pad noted with 25% bright red blood and 75% dark blood, pad was 85% saturated with blood.

## 2021-01-23 NOTE — ED PROVIDER NOTES
Patient Seen in: [facility] Emergency Department      History   Patient presents with:  Eval-G  Urinary Symptoms    Stated Complaint: uti vaginal bleeding     HPI/Subjective:   HPI    40-year-old female presents emergency department who has been hav Teddy Zamorano DO at [facility] MAIN OR   • LUMBAR EPIDURAL N/A 9/15/2015    Performed by David Fitzpatrick MD at [address] [place] [address] N/A 8/27/2015    Performed by David Fitzpatrick MD at [address] [place] [address] Handwashing was performed prior to and after the exam.  Stethoscope and any equipment used during my examination was cleaned with super sani-cloth germicidal wipes following the exam.         Vital signs reviewed  General appearance: Patient is alert and i other components within normal limits   RAPID SARS-COV-2 BY PCR - Normal   CBC WITH DIFFERENTIAL WITH PLATELET    Narrative: The following orders were created for panel order CBC WITH DIFFERENTIAL WITH PLATELET.   Procedure retention due to clots I feel she should be admitted. I will contact her primary. MDM      Patient was evaluated in the emergency department and at this point patient will need admission for further management of medical condition.   Patient was stable

## 2021-01-24 LAB
ALBUMIN SERPL-MCNC: 2.7 G/DL (ref 3.4–5)
ALBUMIN/GLOB SERPL: 0.8 {RATIO} (ref 1–2)
ALP LIVER SERPL-CCNC: 82 U/L
ALT SERPL-CCNC: 16 U/L
ANION GAP SERPL CALC-SCNC: 4 MMOL/L (ref 0–18)
AST SERPL-CCNC: 21 U/L (ref 15–37)
BASOPHILS # BLD AUTO: 0.03 X10(3) UL (ref 0–0.2)
BASOPHILS NFR BLD AUTO: 0.3 %
BILIRUB SERPL-MCNC: 0.9 MG/DL (ref 0.1–2)
BUN BLD-MCNC: 17 MG/DL (ref 7–18)
BUN/CREAT SERPL: 18.1 (ref 10–20)
CALCIUM BLD-MCNC: 9.6 MG/DL (ref 8.5–10.1)
CHLORIDE SERPL-SCNC: 107 MMOL/L (ref 98–112)
CO2 SERPL-SCNC: 26 MMOL/L (ref 21–32)
CREAT BLD-MCNC: 0.94 MG/DL
DEPRECATED RDW RBC AUTO: 54.5 FL (ref 35.1–46.3)
EOSINOPHIL # BLD AUTO: 0.15 X10(3) UL (ref 0–0.7)
EOSINOPHIL NFR BLD AUTO: 1.5 %
ERYTHROCYTE [DISTWIDTH] IN BLOOD BY AUTOMATED COUNT: 14 % (ref 11–15)
GLOBULIN PLAS-MCNC: 3.6 G/DL (ref 2.8–4.4)
GLUCOSE BLD-MCNC: 93 MG/DL (ref 70–99)
HCT VFR BLD AUTO: 41.3 %
HGB BLD-MCNC: 13.3 G/DL
IMM GRANULOCYTES # BLD AUTO: 0.06 X10(3) UL (ref 0–1)
IMM GRANULOCYTES NFR BLD: 0.6 %
INR BLD: 1.54 (ref 0.89–1.11)
LYMPHOCYTES # BLD AUTO: 1.18 X10(3) UL (ref 1–4)
LYMPHOCYTES NFR BLD AUTO: 11.6 %
M PROTEIN MFR SERPL ELPH: 6.3 G/DL (ref 6.4–8.2)
MCH RBC QN AUTO: 33.8 PG (ref 26–34)
MCHC RBC AUTO-ENTMCNC: 32.2 G/DL (ref 31–37)
MCV RBC AUTO: 105.1 FL
MONOCYTES # BLD AUTO: 0.98 X10(3) UL (ref 0.1–1)
MONOCYTES NFR BLD AUTO: 9.6 %
NEUTROPHILS # BLD AUTO: 7.81 X10 (3) UL (ref 1.5–7.7)
NEUTROPHILS # BLD AUTO: 7.81 X10(3) UL (ref 1.5–7.7)
NEUTROPHILS NFR BLD AUTO: 76.4 %
OSMOLALITY SERPL CALC.SUM OF ELEC: 285 MOSM/KG (ref 275–295)
PLATELET # BLD AUTO: 174 10(3)UL (ref 150–450)
POTASSIUM SERPL-SCNC: 4.3 MMOL/L (ref 3.5–5.1)
PSA SERPL DL<=0.01 NG/ML-MCNC: 18.9 SECONDS (ref 12.4–14.6)
RBC # BLD AUTO: 3.93 X10(6)UL
SODIUM SERPL-SCNC: 137 MMOL/L (ref 136–145)
WBC # BLD AUTO: 10.2 X10(3) UL (ref 4–11)

## 2021-01-24 PROCEDURE — 99202 OFFICE O/P NEW SF 15 MIN: CPT | Performed by: UROLOGY

## 2021-01-24 RX ORDER — ACETAMINOPHEN 325 MG/1
650 TABLET ORAL EVERY 6 HOURS PRN
Status: DISCONTINUED | OUTPATIENT
Start: 2021-01-24 | End: 2021-01-25

## 2021-01-24 RX ORDER — FUROSEMIDE 40 MG/1
40 TABLET ORAL DAILY
Status: DISCONTINUED | OUTPATIENT
Start: 2021-01-24 | End: 2021-01-25

## 2021-01-24 RX ORDER — POTASSIUM CHLORIDE 750 MG/1
10 TABLET, EXTENDED RELEASE ORAL DAILY
Status: DISCONTINUED | OUTPATIENT
Start: 2021-01-24 | End: 2021-01-25

## 2021-01-24 NOTE — PLAN OF CARE
NURSING ADMISSION NOTE      Patient admitted via  bed  Oriented to room. Safety precautions initiated. Bed in low position. Call light in reach. Daughter at bedside to provide assist in Admission. Reviewed medications.  Concern's over patients man management  - Manage/alleviate anxiety  - Utilize distraction and/or relaxation techniques  - Monitor for opioid side effects  - Notify MD/LIP if interventions unsuccessful or patient reports new pain  - Anticipate increased pain with activity and pre-medi physician/LIP order or complex needs related to functional status, cognitive ability or social support system  Outcome: Progressing

## 2021-01-24 NOTE — H&P
[facility] [address] Patient Status:  Observation    1933 MRN [id number]   [facility] 3NW-A Attending Monica Moreno MD   Hosp Day # 0 PCP Saundra Maurice MD     History of Present Illness:  Ene Schultz • LAPAROSCOPIC CHOLECYSTECTOMY      3/2014   • LAPAROSCOPIC VENTRAL HERNIA REPAIR N/A 9/10/2014    Performed by Pepper Veliz DO at [facility] 9/15/2015    Performed by Meredith Elias MD at [address] [place] [address] Disp: 180 tablet, Rfl: 0, 1/22/2021 at Unknown time    •  Ferrous Sulfate ER (IRON SLOW RELEASE) 140 (45 Fe) MG Oral Tab CR, Take 1 tablet by mouth daily. , Disp: , Rfl: , 1/22/2021 at Unknown time    •  XARELTO 15 MG Oral Tab, TAKE ONE TABLET BY MOUTH AIDAN 1/22/2021 at Unknown time        Review of Systems:  A comprehensive 10 point review of systems was completed. Pertinent positives and negatives noted in the the HPI.     Physical Exam:  Vital signs: Blood pressure 130/78, pulse 89, temperature 98.1 °F (36 is not well distended but take contains nonspecific internal debris some of which demonstrates increased echogenicity. Direct visualization recommended as clinically indicated.       Dictated by (CST): Tabby Duarte MD on 1/23/2021 at 10:01 AM     Finalized b

## 2021-01-24 NOTE — PLAN OF CARE
A&ox4, VSS, afebrile. A fib on tele monitoring. Room air. Tolerating diet, denies nausea. CBI running at fast rate, light pink urine draining. IV saline locked. Denies pain. Pt updated on plan of care, verbalizes understanding.         @8924: Pt reports belia ADULT  Goal: Absence of fever/infection during anticipated neutropenic period  Description: INTERVENTIONS  - Monitor WBC  - Administer growth factors as ordered  - Implement neutropenic guidelines  Outcome: Progressing     Problem: [facility]

## 2021-01-24 NOTE — PROGRESS NOTES
Dr. Laci Castrejon on floor and would like urine culture sent from UA sample from yesterday. Lab called and stated they will look for sample and send urine culture if possible. New order placed. Will continue to monitor.

## 2021-01-25 ENCOUNTER — APPOINTMENT (OUTPATIENT)
Dept: ULTRASOUND IMAGING | Facility: HOSPITAL | Age: 86
End: 2021-01-25
Attending: INTERNAL MEDICINE
Payer: MEDICARE

## 2021-01-25 ENCOUNTER — TELEPHONE (OUTPATIENT)
Dept: SURGERY | Facility: CLINIC | Age: 86
End: 2021-01-25

## 2021-01-25 VITALS
OXYGEN SATURATION: 97 % | HEART RATE: 78 BPM | TEMPERATURE: 98 F | SYSTOLIC BLOOD PRESSURE: 109 MMHG | WEIGHT: 213 LBS | HEIGHT: 63 IN | BODY MASS INDEX: 37.74 KG/M2 | DIASTOLIC BLOOD PRESSURE: 56 MMHG | RESPIRATION RATE: 18 BRPM

## 2021-01-25 PROCEDURE — 99211 OFF/OP EST MAY X REQ PHY/QHP: CPT | Performed by: UROLOGY

## 2021-01-25 PROCEDURE — 76770 US EXAM ABDO BACK WALL COMP: CPT | Performed by: INTERNAL MEDICINE

## 2021-01-25 RX ORDER — ACETAMINOPHEN 325 MG/1
650 TABLET ORAL EVERY 6 HOURS PRN
Refills: 0 | Status: SHIPPED | COMMUNITY
Start: 2021-01-25 | End: 2021-03-31

## 2021-01-25 RX ORDER — SULFAMETHOXAZOLE AND TRIMETHOPRIM 800; 160 MG/1; MG/1
1 TABLET ORAL EVERY 12 HOURS SCHEDULED
Qty: 20 TABLET | Refills: 0 | Status: SHIPPED | OUTPATIENT
Start: 2021-01-25 | End: 2021-01-25

## 2021-01-25 RX ORDER — SULFAMETHOXAZOLE AND TRIMETHOPRIM 800; 160 MG/1; MG/1
1 TABLET ORAL EVERY 12 HOURS SCHEDULED
Status: DISCONTINUED | OUTPATIENT
Start: 2021-01-25 | End: 2021-01-25

## 2021-01-25 RX ORDER — SULFAMETHOXAZOLE AND TRIMETHOPRIM 800; 160 MG/1; MG/1
1 TABLET ORAL EVERY 12 HOURS SCHEDULED
Qty: 20 TABLET | Refills: 0 | Status: SHIPPED | OUTPATIENT
Start: 2021-01-25 | End: 2021-02-04

## 2021-01-25 NOTE — CM/SW NOTE
81 yo pt from [address] [facility]. Admitted with hematuria, cystitis. CM acknowledging order for discharge planning. Attempted to meet with pt but she is off unit for US. Left message for Audie Boothe at [address].    Per RN during rounds

## 2021-01-25 NOTE — PLAN OF CARE
Written and verbal discharge instructions given to patient and daughter, Lynnette Pal and verbalize understanding. Prescription given for Xarelto though daughter is wondering why pt  was given a prescription by Dr Sean Hernandez when this is not a new medication. Explain

## 2021-01-25 NOTE — PROGRESS NOTES
The urine is clear jerica. Patient is comfortable and afebrile. Urine culture shows E. coli which is pansensitive. Bladder ultrasound demonstrated some internal debris with increased echogenicity and renal ultrasound is pending.   Patient may be dischar

## 2021-01-25 NOTE — TELEPHONE ENCOUNTER
RN called patient to set up appt in 3 weeks. Spoke to Javi Navarro, said patient is still at the hospital waiting to be discharged today. RN to call back at AM to set up cystoscopy appointment with Dr Babatunde Tucker.  She agreed to patient back at AM.     \"Schedule

## 2021-01-25 NOTE — PROGRESS NOTES
Pt. Resting in bed today. Pt assisted to chair with one person without difficulty. CBI infusion at slow rate. Urine remains clear. Pt c/o of intermittent discomfort, tylenol given with some relief. Oates irrigated earlier this am with not clots noted.

## 2021-01-25 NOTE — PLAN OF CARE
Yellow urine noted in faith. CBI off since 0600. Will  discontinue faith once bladder US is completed. Poc updated, pt verbalized understanding.   Problem: PAIN - ADULT  Goal: Verbalizes/displays adequate comfort level or patient's stated pain goal  Descrip

## 2021-01-25 NOTE — CONSULTS
[facility]    Report of Consultation    Onita Mealing Patient Status:  Observation    1933 MRN [id number]   [facility] 3NW-A Attending Keyana Charles MD   [facility] Day # 0 PCP Rosita Frank MD     Date of Admission:  2021  [facility])    • COPD exacerbation ([facility] [facility] 1/7/2020   • Diabetes mellitus type 2, diet-controlled [facility] 1/23/2013   • Dyspnea on exertion 3/20/2020   • GERD (gastroesophageal reflux disease)    • H1N1 influenza    • High blood pressure    • HTN (hypertension)    • (CAD) Brother    • Cancer Daughter 44        BREAST CA       Social History  Patient Guardians:  Not on file    Other Topics            Concern  Caffeine Concern        No  Exercise                Yes    Comment:1 time/week    Social History Narrative    N (2.5 MG/3ML) 0.083% Inhalation Nebu Soln, USE 1 VIAL VIA NEBULIZER EVERY 6 HOUR AS NEEDED FOR WHEEZING    •  fluticasone-salmeterol 250-50 MCG/DOSE Inhalation Aerosol Powder, Breath Activated, INHALE 1 PUFF BY MOUTH INTO THE LUNGS EVERY 12 HOURS    •  BETY BUN 17 01/24/2021     01/24/2021    K 4.3 01/24/2021     01/24/2021    CO2 26.0 01/24/2021    GLU 93 01/24/2021    CA 9.6 01/24/2021    ALB 2.7 (L) 01/24/2021    ALKPHO 82 01/24/2021    TP 6.3 (L) 01/24/2021    AST 21 01/24/2021    ALT 16 01/24 use of Kegel exercises the patient. She may want to consider replacing her incontinence pads more frequently.  -Plan clamping CBI assuming her urine is clear in the morning. If she continues to feel well.   May remove Oates catheter prior to discharge tai

## 2021-01-25 NOTE — TELEPHONE ENCOUNTER
Schedule patient for flexible cystoscopy in the office in about 3 weeks. She is still an inpatient in the hospital this morning.   May go home this afternoon

## 2021-01-25 NOTE — PLAN OF CARE
Pt a&ox4, vss, afebrile. A flutter on tele monitoring. Room air. Denies pain. CBI running very slow tonight, light yellow urine draining, no clots noted. IV saline locked. Plan to clamp CBI at 0600. Receiving rocephin. US bladder pending.  Pt updated on gagandeep Assess pt frequently for physical needs  - Identify cognitive and physical deficits and behaviors that affect risk of falls.   - [place] fall precautions as indicated by assessment.  - Educate pt/family on patient safety including physical limitations  -

## 2021-01-26 DIAGNOSIS — Z23 NEED FOR VACCINATION: ICD-10-CM

## 2021-01-28 RX ORDER — LORAZEPAM 0.5 MG/1
0.5 TABLET ORAL ONCE
Qty: 1 TABLET | Refills: 0 | Status: SHIPPED | OUTPATIENT
Start: 2021-01-28 | End: 2021-01-28

## 2021-01-28 NOTE — TELEPHONE ENCOUNTER
RN received a call back from the daughter, Isi Mccrary and requested to set up cystoscopy after Feb 10. She is agreeable to 2/12 Friday at 2pm.     She also requested for an order of Ativan for her mom, as she had this procedure before.  Daughter will be with mom

## 2021-02-01 ENCOUNTER — HOSPITAL ENCOUNTER (OUTPATIENT)
Facility: HOSPITAL | Age: 86
Setting detail: OBSERVATION
Discharge: HOME | End: 2021-02-03
Attending: EMERGENCY MEDICINE | Admitting: INTERNAL MEDICINE
Payer: MEDICARE

## 2021-02-01 DIAGNOSIS — R31.0 GROSS HEMATURIA: Primary | ICD-10-CM

## 2021-02-01 LAB
ALBUMIN SERPL-MCNC: 2.9 G/DL (ref 3.4–5)
ALBUMIN/GLOB SERPL: 0.7 {RATIO} (ref 1–2)
ALP LIVER SERPL-CCNC: 81 U/L
ALT SERPL-CCNC: 21 U/L
ANION GAP SERPL CALC-SCNC: 4 MMOL/L (ref 0–18)
AST SERPL-CCNC: 31 U/L (ref 15–37)
BASOPHILS # BLD AUTO: 0.06 X10(3) UL (ref 0–0.2)
BASOPHILS NFR BLD AUTO: 0.8 %
BILIRUB SERPL-MCNC: 0.3 MG/DL (ref 0.1–2)
BILIRUB UR QL STRIP.AUTO: NEGATIVE
BUN BLD-MCNC: 26 MG/DL (ref 7–18)
BUN/CREAT SERPL: 20.2 (ref 10–20)
CALCIUM BLD-MCNC: 9.5 MG/DL (ref 8.5–10.1)
CHLORIDE SERPL-SCNC: 108 MMOL/L (ref 98–112)
CO2 SERPL-SCNC: 26 MMOL/L (ref 21–32)
CREAT BLD-MCNC: 1.29 MG/DL
DEPRECATED RDW RBC AUTO: 54.1 FL (ref 35.1–46.3)
EOSINOPHIL # BLD AUTO: 0.18 X10(3) UL (ref 0–0.7)
EOSINOPHIL NFR BLD AUTO: 2.3 %
ERYTHROCYTE [DISTWIDTH] IN BLOOD BY AUTOMATED COUNT: 14.1 % (ref 11–15)
GLOBULIN PLAS-MCNC: 4.1 G/DL (ref 2.8–4.4)
GLUCOSE BLD-MCNC: 82 MG/DL (ref 70–99)
GLUCOSE UR STRIP.AUTO-MCNC: NEGATIVE MG/DL
HCT VFR BLD AUTO: 37.8 %
HGB BLD-MCNC: 12.3 G/DL
IMM GRANULOCYTES # BLD AUTO: 0.03 X10(3) UL (ref 0–1)
IMM GRANULOCYTES NFR BLD: 0.4 %
INR BLD: 2.5 (ref 0.89–1.11)
KETONES UR STRIP.AUTO-MCNC: NEGATIVE MG/DL
LYMPHOCYTES # BLD AUTO: 1.03 X10(3) UL (ref 1–4)
LYMPHOCYTES NFR BLD AUTO: 13.4 %
M PROTEIN MFR SERPL ELPH: 7 G/DL (ref 6.4–8.2)
MCH RBC QN AUTO: 34.1 PG (ref 26–34)
MCHC RBC AUTO-ENTMCNC: 32.5 G/DL (ref 31–37)
MCV RBC AUTO: 104.7 FL
MONOCYTES # BLD AUTO: 0.58 X10(3) UL (ref 0.1–1)
MONOCYTES NFR BLD AUTO: 7.5 %
NEUTROPHILS # BLD AUTO: 5.83 X10 (3) UL (ref 1.5–7.7)
NEUTROPHILS # BLD AUTO: 5.83 X10(3) UL (ref 1.5–7.7)
NEUTROPHILS NFR BLD AUTO: 75.6 %
NITRITE UR QL STRIP.AUTO: NEGATIVE
OSMOLALITY SERPL CALC.SUM OF ELEC: 290 MOSM/KG (ref 275–295)
PH UR STRIP.AUTO: 6 [PH] (ref 4.5–8)
PLATELET # BLD AUTO: 225 10(3)UL (ref 150–450)
POTASSIUM SERPL-SCNC: 4.8 MMOL/L (ref 3.5–5.1)
PROT UR STRIP.AUTO-MCNC: 100 MG/DL
PSA SERPL DL<=0.01 NG/ML-MCNC: 27.6 SECONDS (ref 12.4–14.6)
RBC # BLD AUTO: 3.61 X10(6)UL
RBC #/AREA URNS AUTO: >10 /HPF
SARS-COV-2 RNA RESP QL NAA+PROBE: NOT DETECTED
SODIUM SERPL-SCNC: 138 MMOL/L (ref 136–145)
SP GR UR STRIP.AUTO: 1.01 (ref 1–1.03)
UROBILINOGEN UR STRIP.AUTO-MCNC: <2 MG/DL
WBC # BLD AUTO: 7.7 X10(3) UL (ref 4–11)
WBC #/AREA URNS AUTO: >50 /HPF

## 2021-02-01 PROCEDURE — 99213 OFFICE O/P EST LOW 20 MIN: CPT | Performed by: UROLOGY

## 2021-02-01 RX ORDER — PANTOPRAZOLE SODIUM 40 MG/1
40 TABLET, DELAYED RELEASE ORAL
Refills: 0 | Status: DISCONTINUED | OUTPATIENT
Start: 2021-02-02 | End: 2021-02-03

## 2021-02-01 RX ORDER — ALBUTEROL SULFATE 90 UG/1
2 AEROSOL, METERED RESPIRATORY (INHALATION) EVERY 6 HOURS PRN
Status: DISCONTINUED | OUTPATIENT
Start: 2021-02-01 | End: 2021-02-03

## 2021-02-01 RX ORDER — FLUTICASONE PROPIONATE AND SALMETEROL 250; 50 UG/1; UG/1
1 POWDER RESPIRATORY (INHALATION) EVERY 12 HOURS SCHEDULED
COMMUNITY
End: 2021-11-08

## 2021-02-01 RX ORDER — GARLIC EXTRACT 500 MG
1 CAPSULE ORAL DAILY
Status: DISCONTINUED | OUTPATIENT
Start: 2021-02-02 | End: 2021-02-03

## 2021-02-01 RX ORDER — ALBUTEROL SULFATE 90 UG/1
2 AEROSOL, METERED RESPIRATORY (INHALATION) EVERY 6 HOURS PRN
COMMUNITY

## 2021-02-01 RX ORDER — CARVEDILOL 6.25 MG/1
12.5 TABLET ORAL 2 TIMES DAILY WITH MEALS
COMMUNITY
End: 2021-05-07

## 2021-02-01 RX ORDER — FUROSEMIDE 20 MG/1
40 TABLET ORAL DAILY
COMMUNITY
End: 2021-03-24

## 2021-02-01 RX ORDER — FLUTICASONE PROPIONATE 50 MCG
2 SPRAY, SUSPENSION (ML) NASAL DAILY
Status: DISCONTINUED | OUTPATIENT
Start: 2021-02-02 | End: 2021-02-03

## 2021-02-01 RX ORDER — LISINOPRIL 10 MG/1
10 TABLET ORAL DAILY
Status: DISCONTINUED | OUTPATIENT
Start: 2021-02-02 | End: 2021-02-03

## 2021-02-01 RX ORDER — LISINOPRIL 10 MG/1
10 TABLET ORAL NIGHTLY
COMMUNITY
End: 2021-05-07

## 2021-02-01 RX ORDER — SULFAMETHOXAZOLE AND TRIMETHOPRIM 800; 160 MG/1; MG/1
1 TABLET ORAL EVERY 12 HOURS SCHEDULED
Status: DISCONTINUED | OUTPATIENT
Start: 2021-02-01 | End: 2021-02-03

## 2021-02-01 RX ORDER — ACETAMINOPHEN 325 MG/1
650 TABLET ORAL EVERY 6 HOURS PRN
Status: DISCONTINUED | OUTPATIENT
Start: 2021-02-01 | End: 2021-02-03

## 2021-02-01 RX ORDER — FLUTICASONE PROPIONATE 50 MCG
2 SPRAY, SUSPENSION (ML) NASAL DAILY PRN
COMMUNITY

## 2021-02-01 RX ORDER — ALBUTEROL SULFATE 2.5 MG/3ML
2.5 SOLUTION RESPIRATORY (INHALATION) EVERY 6 HOURS PRN
COMMUNITY

## 2021-02-01 RX ORDER — CARVEDILOL 12.5 MG/1
12.5 TABLET ORAL 2 TIMES DAILY WITH MEALS
Status: DISCONTINUED | OUTPATIENT
Start: 2021-02-01 | End: 2021-02-03

## 2021-02-01 RX ORDER — MELATONIN
325 DAILY
Status: DISCONTINUED | OUTPATIENT
Start: 2021-02-02 | End: 2021-02-03

## 2021-02-01 NOTE — PROGRESS NOTES
NURSING ADMISSION NOTE      Patient admitted via Cart  Oriented to room. Safety precautions initiated. Bed in low position. Call light in reach. Patient arrived to the unit in stable condition from the ED. Patient is alert and oriented x4.  Lung

## 2021-02-01 NOTE — ED INITIAL ASSESSMENT (HPI)
Patient has recent UTI for which she was admitted for 2 days and has been taking Bactrim PO outpatient. Has cystoscopy scheduled for next week. Patient reports increase in incontinence and hematuria since discharge. AAO x 4.  Patient is ambulatory with one

## 2021-02-01 NOTE — PLAN OF CARE
Problem: Patient/Family Goals  Goal: Patient/Family Long Term Goal  Description: Patient's Long Term Goal: Discharge Home    Interventions:  - Pain Tolerance   -Diet Tolerance  - See additional Care Plan goals for specific interventions  Outcome: Progres

## 2021-02-01 NOTE — ED PROVIDER NOTES
Patient Seen in: [facility] Emergency Department      History   Patient presents with:  Eval-G    Stated Complaint: eval g    HPI/Subjective:   HPI    80-year-old female here for hematuria.   The patient was recently hospitalized for 3 days about a w • HYSTERECTOMY     • LAPAROSCOPIC CHOLECYSTECTOMY      3/2014   • LAPAROSCOPIC VENTRAL HERNIA REPAIR N/A 9/10/2014    Performed by Dolores Mcdaniel DO at [facility] [place], operated by [facility] 9/15/2015    Performed by Dominick Nails MD at [place] [address] auscultation. Cardiovascular exam regular rate and rhythm without murmurs. Abdomen is soft and mild suprapubic tenderness without rebound or guarding. Extremities no clubbing cyanosis or edema. Skin there is no rash.   Neurologic exam is within normal l IV antibiotics admitted with urology on consult for possible cystoscopy tomorrow.   Admission disposition: 2/1/2021 11:12 AM                              Disposition and Plan     Clinical Impression:  Gross hematuria  (primary encounter diagnosis)    Dispos

## 2021-02-01 NOTE — CONSULTS
[facility]     Report of Consultation    [address]  [phone]       Bhupinder Noble Patient Status:  Emergency    1933 MRN [id number]   Location [facility] [facility] Attending Sonia Suero MD   [facility] Day [facility])    • Urethral stenosis 03-    dr. Do Zamudio   • Venous insufficiency    • Ventral hernia 8/11/2014   • Viral pneumonia 3/20/2020     Past Surgical History:   Procedure Laterality Date   • APPENDECTOMY     • CHOLECYSTECTOMY     • COLONOSCOPY this encounter.      Review of Systems:  Genitourinary review: As above    Physical Exam:  /88   Pulse 102   Temp 97.8 °F (36.6 °C) (Temporal)   Resp 15   Wt 213 lb 13.5 oz (97 kg)   SpO2 92%   BMI 37.88 kg/m²   No intake or output data in the 24 hour Technologist)     MEASUREMENTS:   FINDINGS:   BLADDER:  Bladder is not well distended but take contains nonspecific internal debris some of which demonstrates increased echogenicity. Direct visualization recommended as clinically indicated.       Dictated

## 2021-02-02 ENCOUNTER — ANESTHESIA EVENT (OUTPATIENT)
Dept: SURGERY | Facility: HOSPITAL | Age: 86
End: 2021-02-02
Payer: MEDICARE

## 2021-02-02 ENCOUNTER — APPOINTMENT (OUTPATIENT)
Dept: GENERAL RADIOLOGY | Facility: HOSPITAL | Age: 86
End: 2021-02-02
Attending: UROLOGY
Payer: MEDICARE

## 2021-02-02 ENCOUNTER — ANESTHESIA (OUTPATIENT)
Dept: SURGERY | Facility: HOSPITAL | Age: 86
End: 2021-02-02
Payer: MEDICARE

## 2021-02-02 ENCOUNTER — TELEPHONE (OUTPATIENT)
Dept: SURGERY | Facility: CLINIC | Age: 86
End: 2021-02-02

## 2021-02-02 LAB
ALBUMIN SERPL-MCNC: 2.7 G/DL (ref 3.4–5)
ALBUMIN/GLOB SERPL: 0.7 {RATIO} (ref 1–2)
ALP LIVER SERPL-CCNC: 75 U/L
ALT SERPL-CCNC: 19 U/L
ANION GAP SERPL CALC-SCNC: 4 MMOL/L (ref 0–18)
AST SERPL-CCNC: 20 U/L (ref 15–37)
BASOPHILS # BLD AUTO: 0.04 X10(3) UL (ref 0–0.2)
BASOPHILS NFR BLD AUTO: 0.6 %
BILIRUB SERPL-MCNC: 0.4 MG/DL (ref 0.1–2)
BUN BLD-MCNC: 24 MG/DL (ref 7–18)
BUN/CREAT SERPL: 21.2 (ref 10–20)
CALCIUM BLD-MCNC: 9 MG/DL (ref 8.5–10.1)
CHLORIDE SERPL-SCNC: 108 MMOL/L (ref 98–112)
CO2 SERPL-SCNC: 26 MMOL/L (ref 21–32)
CREAT BLD-MCNC: 1.13 MG/DL
DEPRECATED RDW RBC AUTO: 54.5 FL (ref 35.1–46.3)
EOSINOPHIL # BLD AUTO: 0.19 X10(3) UL (ref 0–0.7)
EOSINOPHIL NFR BLD AUTO: 2.6 %
ERYTHROCYTE [DISTWIDTH] IN BLOOD BY AUTOMATED COUNT: 14.1 % (ref 11–15)
GLOBULIN PLAS-MCNC: 3.7 G/DL (ref 2.8–4.4)
GLUCOSE BLD-MCNC: 80 MG/DL (ref 70–99)
HCT VFR BLD AUTO: 37.2 %
HGB BLD-MCNC: 11.8 G/DL
IMM GRANULOCYTES # BLD AUTO: 0.04 X10(3) UL (ref 0–1)
IMM GRANULOCYTES NFR BLD: 0.6 %
INR BLD: 1.48 (ref 0.89–1.11)
LYMPHOCYTES # BLD AUTO: 0.96 X10(3) UL (ref 1–4)
LYMPHOCYTES NFR BLD AUTO: 13.2 %
M PROTEIN MFR SERPL ELPH: 6.4 G/DL (ref 6.4–8.2)
MCH RBC QN AUTO: 33.6 PG (ref 26–34)
MCHC RBC AUTO-ENTMCNC: 31.7 G/DL (ref 31–37)
MCV RBC AUTO: 106 FL
MONOCYTES # BLD AUTO: 0.58 X10(3) UL (ref 0.1–1)
MONOCYTES NFR BLD AUTO: 8 %
NEUTROPHILS # BLD AUTO: 5.45 X10 (3) UL (ref 1.5–7.7)
NEUTROPHILS # BLD AUTO: 5.45 X10(3) UL (ref 1.5–7.7)
NEUTROPHILS NFR BLD AUTO: 75 %
OSMOLALITY SERPL CALC.SUM OF ELEC: 289 MOSM/KG (ref 275–295)
PLATELET # BLD AUTO: 202 10(3)UL (ref 150–450)
POTASSIUM SERPL-SCNC: 4.7 MMOL/L (ref 3.5–5.1)
PSA SERPL DL<=0.01 NG/ML-MCNC: 18.3 SECONDS (ref 12.4–14.6)
RBC # BLD AUTO: 3.51 X10(6)UL
SODIUM SERPL-SCNC: 138 MMOL/L (ref 136–145)
WBC # BLD AUTO: 7.3 X10(3) UL (ref 4–11)

## 2021-02-02 PROCEDURE — 52001 CYSTO W/IRRG&EVAC MLT CLOTS: CPT | Performed by: UROLOGY

## 2021-02-02 PROCEDURE — 99215 OFFICE O/P EST HI 40 MIN: CPT | Performed by: INTERNAL MEDICINE

## 2021-02-02 PROCEDURE — 0TCB8ZZ EXTIRPATION OF MATTER FROM BLADDER, VIA NATURAL OR ARTIFICIAL OPENING ENDOSCOPIC: ICD-10-PCS | Performed by: UROLOGY

## 2021-02-02 RX ORDER — HYDROMORPHONE HYDROCHLORIDE 1 MG/ML
0.4 INJECTION, SOLUTION INTRAMUSCULAR; INTRAVENOUS; SUBCUTANEOUS EVERY 5 MIN PRN
Status: DISCONTINUED | OUTPATIENT
Start: 2021-02-02 | End: 2021-02-02 | Stop reason: HOSPADM

## 2021-02-02 RX ORDER — SODIUM CHLORIDE, SODIUM LACTATE, POTASSIUM CHLORIDE, CALCIUM CHLORIDE 600; 310; 30; 20 MG/100ML; MG/100ML; MG/100ML; MG/100ML
INJECTION, SOLUTION INTRAVENOUS CONTINUOUS
Status: DISCONTINUED | OUTPATIENT
Start: 2021-02-02 | End: 2021-02-02 | Stop reason: HOSPADM

## 2021-02-02 RX ORDER — DEXAMETHASONE SODIUM PHOSPHATE 4 MG/ML
VIAL (ML) INJECTION AS NEEDED
Status: DISCONTINUED | OUTPATIENT
Start: 2021-02-02 | End: 2021-02-02 | Stop reason: SURG

## 2021-02-02 RX ORDER — DEXTROSE MONOHYDRATE 25 G/50ML
50 INJECTION, SOLUTION INTRAVENOUS
Status: DISCONTINUED | OUTPATIENT
Start: 2021-02-02 | End: 2021-02-02 | Stop reason: HOSPADM

## 2021-02-02 RX ORDER — LIDOCAINE HYDROCHLORIDE 10 MG/ML
INJECTION, SOLUTION EPIDURAL; INFILTRATION; INTRACAUDAL; PERINEURAL AS NEEDED
Status: DISCONTINUED | OUTPATIENT
Start: 2021-02-02 | End: 2021-02-02 | Stop reason: SURG

## 2021-02-02 RX ORDER — ONDANSETRON 2 MG/ML
INJECTION INTRAMUSCULAR; INTRAVENOUS AS NEEDED
Status: DISCONTINUED | OUTPATIENT
Start: 2021-02-02 | End: 2021-02-02 | Stop reason: SURG

## 2021-02-02 RX ORDER — HYDROCODONE BITARTRATE AND ACETAMINOPHEN 5; 325 MG/1; MG/1
2 TABLET ORAL AS NEEDED
Status: DISCONTINUED | OUTPATIENT
Start: 2021-02-02 | End: 2021-02-02 | Stop reason: HOSPADM

## 2021-02-02 RX ORDER — MAGNESIUM HYDROXIDE 1200 MG/15ML
3000 LIQUID ORAL CONTINUOUS
Status: DISCONTINUED | OUTPATIENT
Start: 2021-02-02 | End: 2021-02-03

## 2021-02-02 RX ORDER — MEPERIDINE HYDROCHLORIDE 25 MG/ML
12.5 INJECTION INTRAMUSCULAR; INTRAVENOUS; SUBCUTANEOUS AS NEEDED
Status: DISCONTINUED | OUTPATIENT
Start: 2021-02-02 | End: 2021-02-02 | Stop reason: HOSPADM

## 2021-02-02 RX ORDER — HYDROCODONE BITARTRATE AND ACETAMINOPHEN 5; 325 MG/1; MG/1
1 TABLET ORAL AS NEEDED
Status: DISCONTINUED | OUTPATIENT
Start: 2021-02-02 | End: 2021-02-02 | Stop reason: HOSPADM

## 2021-02-02 RX ORDER — ONDANSETRON 2 MG/ML
4 INJECTION INTRAMUSCULAR; INTRAVENOUS AS NEEDED
Status: DISCONTINUED | OUTPATIENT
Start: 2021-02-02 | End: 2021-02-02 | Stop reason: HOSPADM

## 2021-02-02 RX ORDER — NALOXONE HYDROCHLORIDE 0.4 MG/ML
80 INJECTION, SOLUTION INTRAMUSCULAR; INTRAVENOUS; SUBCUTANEOUS AS NEEDED
Status: DISCONTINUED | OUTPATIENT
Start: 2021-02-02 | End: 2021-02-02 | Stop reason: HOSPADM

## 2021-02-02 RX ADMIN — ONDANSETRON 4 MG: 2 INJECTION INTRAMUSCULAR; INTRAVENOUS at 07:50:00

## 2021-02-02 RX ADMIN — DEXAMETHASONE SODIUM PHOSPHATE 8 MG: 4 MG/ML VIAL (ML) INJECTION at 07:28:00

## 2021-02-02 RX ADMIN — LIDOCAINE HYDROCHLORIDE 50 MG: 10 INJECTION, SOLUTION EPIDURAL; INFILTRATION; INTRACAUDAL; PERINEURAL at 07:22:00

## 2021-02-02 NOTE — PLAN OF CARE
Patient is alert and oriented x4. Lung sounds are clear in all lobes. Patient is on room air. O2 sat 98%. Abdomen is soft and round. Active bowel sounds. Redness underneath the right abdominal fold. Patient denies nausea and pain.  CBI running at a slow rat

## 2021-02-02 NOTE — PLAN OF CARE
Pt axo4, denies pain, three way faith placed CBI infusion clear pink urine in faith bag. Plan of care updated with patient, pt verbalized understanding.        Problem: GENITOURINARY - ADULT  Goal: Absence of urinary retention  Description: INTERVENTIONS:

## 2021-02-02 NOTE — ANESTHESIA PROCEDURE NOTES
Airway  Urgency: elective      General Information and Staff    Patient location during procedure: OR  Anesthesiologist: Colton Stone MD  Performed: anesthesiologist     Indications and Patient Condition  Indications for airway management: anesthesia  S

## 2021-02-02 NOTE — HISTORICAL OFFICE NOTE
Progress Notes  - documented in this encounter  Wendy Brantley MD - 06/22/2020 12:30 PM CDT  Formatting of this note might be different from the original.    1991 [facility]    PCP: Sawyer Fine MD    No chief complaint on file.       HPI Myopathy  Myopathy    • Propoxyphene And Methadone Other (See Comments)   CLASS   • Propoxyphene N-Apap RASH   • Tramadol HIVES   SENSATIVITY  SENSATIVITY      Presenting Medications  Current Outpatient Medications   Medication Sig   • carvedilol (COREG) are equal, round, and reactive to light. Neck: Normal range of motion. Cardiovascular: Regular rhythm, S1 normal, S2 normal and intact distal pulses.    No carotid bruits bilaterally   Pulmonary/Chest: Effort normal and breath sounds normal. She has no

## 2021-02-02 NOTE — TELEPHONE ENCOUNTER
RN called daughter Mono Rivas, retired RN as well at  [phone] and she requested a call back from Dr Adriel Correa that they missed. The message was broken and didn't understand the message well.  \"Promise, it will be brief\"    Note, patient had cysto wit

## 2021-02-02 NOTE — PROGRESS NOTES
1332: Paged Dr. Ti Mason regarding scheduling of the next surgery. 1402: Dr. Ti Mason called back. Will call scheduling office to reschedule procedure next week. (Originally scheduled for Friday) and they will contact the patient's family.      1409: Richard

## 2021-02-02 NOTE — CM/SW NOTE
02/02/21 1000   CM/SW Referral Data   Referral Source    Reason for Referral Discharge planning   Informant Patient  (and dtr Tracy)   Pertinent Medical Hx   Primary Care Physician Name Imani Cunha   Patient Info   Patient's Mental Status A

## 2021-02-02 NOTE — PROGRESS NOTES
Spoke with Dr. Mike Leigh after the procedure. Informed her of findings. Patient will need TUR biopsies of the bladder wall and a CT scan. Because of her coagulopathy with an INR of 2.5 I was not able to do that today.   We did identify an issue that will nee

## 2021-02-02 NOTE — ANESTHESIA POSTPROCEDURE EVALUATION
[address] Patient Status:  Observation   Age/Gender 80year old female MRN [id number]   Location [facility] Attending Lucille Bell MD   Hosp Day # 0 PCP Bri Enriquez MD       Anesthesia Post-op Note

## 2021-02-02 NOTE — H&P
[facility] [facility] [address] Patient Status:  Observation    1933 MRN [id number]   [facility] 3NW-A Attending Amarilis Hull MD   Hosp Day # 0 PCP Juan Daniel Crawford MD     History of Present Illness:  Gianna King • Venous insufficiency    • Ventral hernia 8/11/2014   • Viral pneumonia 3/20/2020     Past Surgical History:   Procedure Laterality Date   • APPENDECTOMY     • CHOLECYSTECTOMY     • COLONOSCOPY      3/2014   • EGD     • HERNIA SURGERY     • HYSTERECTOMY puff into the lungs every 12 (twelve) hours. , Disp: , Rfl: , 2/1/2021 at 0700    •  Albuterol Sulfate HFA (PROAIR HFA) 108 (90 Base) MCG/ACT Inhalation Aero Soln, Inhale 2 puffs into the lungs every 6 (six) hours as needed for Wheezing., Disp: , Rfl: , Pas 2/1/2021 at 0700    •  dronedarone HCl (MULTAQ) 400 MG Oral Tab, Take 400 mg by mouth 2 (two) times daily with meals. , Disp: , Rfl: , 2/1/2021 at 0700    •  Potassium Chloride ER 10 MEQ Oral Tab CR, Take 10 mEq by mouth daily. , Disp: , Rfl: , 2/1/2021 at 0 CO2 26.0 02/02/2021    GLU 80 02/02/2021    CA 9.0 02/02/2021    ALB 2.7 02/02/2021    ALKPHO 75 02/02/2021    BILT 0.4 02/02/2021    TP 6.4 02/02/2021    AST 20 02/02/2021    ALT 19 02/02/2021    INR 1.48 02/02/2021    PTP 18.3 02/02/2021       Imaging: AM       Xr Or - N/c    Result Date: 2/2/2021            PROCEDURE:  XR OR - N/C  INDICATIONS:  Cystoscopy  COMPARISON:  None.   TECHNIQUE:   FLUOROSCOPY IMAGES OBTAINED:  10 FLUOROSCOPY TIME:  40 seconds TECHNOLOGIST TIME:  20 minutes RADIATION DOSE (AIR K

## 2021-02-02 NOTE — CONSULTS
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 HEENT: No icterus, mucous membranes moist.  Neck: No JVD, carotids 2+ no bruits. Cardiac: Regular rhythm, S1, S2 tachycardic but otherwise normal, no murmur, rub or gallop.   Lungs: Clear without wheeze ([facility] [address]     Gross hematuria     Acute cystitis with hematuria     Urinary retention    · Gross hematuria - now with 3 way faith for irrigation, urology following, s/p cysto today  · Abnormal bladder on cysto with several areas of thick, whitish appearing tis observation of her remote cardiac telemetry, which is to be started today. All of the above was reviewed with Dr. Karthik Sanchez (PCP). Discussed with patient and nursing in detail. Jermain Alcocer  2/2/2021  4:21 PM    Seen and examined.   Agree with above as am

## 2021-02-02 NOTE — ANESTHESIA PREPROCEDURE EVALUATION
PRE-OP EVALUATION    Patient Name: Tu Glaser    Pre-op Diagnosis: INPT    Procedure(s):  CYSTOSCOPY, CLOT EVACUATION, RETROGRADE PYLOGRAM, POSSIBLE BLADDER BIOPSY    Surgeon(s) and Role:     * Jackson Naqvi MD - Primary    Pre-op vitals reviewed (six) hours as needed for Wheezing., Disp: , Rfl: , Past Week at Unknown time    •  albuterol sulfate (2.5 MG/3ML) 0.083% Inhalation Nebu Soln, Take 2.5 mg by nebulization every 6 (six) hours as needed for Wheezing., Disp: , Rfl: , Past Week at Unknown sawyer mouth daily. , Disp: , Rfl: , 2/1/2021 at 0700    •  CRANBERRY EXTRACT OR, Take 500 mg by mouth 2 (two) times a day.  , Disp: , Rfl: , 2/1/2021 at 0700    •  Probiotic Product (ALIGN) Oral Cap, Take 1 capsule by mouth daily. , Disp: , Rfl: , 2/1/2021 at 0700 MAIN OR   • LUMBAR EPIDURAL N/A 9/15/2015    Performed by David Fitzpatrick MD at [address] [place] [address] N/A 8/27/2015    Performed by David Fitzpatrick MD at [address] [place] [address] N/A 8/12/2015    Perfo and patient meets guidelines.         Comment:  I explained intrinsic risks of general anesthesia, including nausea, dental damage, sore throat, mouth injury,and hoarseness from airway management.  All questions were answered and understanding was Carol Ann Weiner

## 2021-02-02 NOTE — OPERATIVE REPORT
Operative Note    Patient Name: Tu Glaser    Date of Procedure: 2/2/2021    Preoperative Diagnosis: Recurrent gross hematuria    Postoperative Diagnosis: Recurrent gross hematuria with clots    Primary Surgeon: Jenifer Ward. Malcolm Rivera M.D.      Procedure then performed.   There was a moderately large clot on the posterior surface of the bladder which was then evacuated and further inspection of the bladder demonstrated several areas of thick whitish appearing tissue one was somewhat nodular on the left late

## 2021-02-03 ENCOUNTER — TELEPHONE (OUTPATIENT)
Dept: SURGERY | Facility: CLINIC | Age: 86
End: 2021-02-03

## 2021-02-03 ENCOUNTER — APPOINTMENT (OUTPATIENT)
Dept: CT IMAGING | Facility: HOSPITAL | Age: 86
End: 2021-02-03
Attending: UROLOGY
Payer: MEDICARE

## 2021-02-03 VITALS
WEIGHT: 213.88 LBS | TEMPERATURE: 98 F | HEART RATE: 105 BPM | SYSTOLIC BLOOD PRESSURE: 120 MMHG | OXYGEN SATURATION: 95 % | BODY MASS INDEX: 38 KG/M2 | DIASTOLIC BLOOD PRESSURE: 65 MMHG | RESPIRATION RATE: 13 BRPM

## 2021-02-03 DIAGNOSIS — C67.9 MALIGNANT NEOPLASM OF URINARY BLADDER, UNSPECIFIED SITE (HCC): Primary | ICD-10-CM

## 2021-02-03 LAB
ALBUMIN SERPL-MCNC: 2.8 G/DL (ref 3.4–5)
ALBUMIN/GLOB SERPL: 0.8 {RATIO} (ref 1–2)
ALP LIVER SERPL-CCNC: 78 U/L
ALT SERPL-CCNC: 22 U/L
ANION GAP SERPL CALC-SCNC: 3 MMOL/L (ref 0–18)
AST SERPL-CCNC: 22 U/L (ref 15–37)
BASOPHILS # BLD AUTO: 0.01 X10(3) UL (ref 0–0.2)
BASOPHILS NFR BLD AUTO: 0.1 %
BILIRUB SERPL-MCNC: 0.2 MG/DL (ref 0.1–2)
BUN BLD-MCNC: 27 MG/DL (ref 7–18)
BUN/CREAT SERPL: 21.1 (ref 10–20)
CALCIUM BLD-MCNC: 8.9 MG/DL (ref 8.5–10.1)
CHLORIDE SERPL-SCNC: 106 MMOL/L (ref 98–112)
CO2 SERPL-SCNC: 27 MMOL/L (ref 21–32)
CREAT BLD-MCNC: 1.28 MG/DL
DEPRECATED RDW RBC AUTO: 54.1 FL (ref 35.1–46.3)
EOSINOPHIL # BLD AUTO: 0 X10(3) UL (ref 0–0.7)
EOSINOPHIL NFR BLD AUTO: 0 %
ERYTHROCYTE [DISTWIDTH] IN BLOOD BY AUTOMATED COUNT: 13.8 % (ref 11–15)
GLOBULIN PLAS-MCNC: 3.7 G/DL (ref 2.8–4.4)
GLUCOSE BLD-MCNC: 113 MG/DL (ref 70–99)
HCT VFR BLD AUTO: 34.7 %
HGB BLD-MCNC: 11.1 G/DL
IMM GRANULOCYTES # BLD AUTO: 0.1 X10(3) UL (ref 0–1)
IMM GRANULOCYTES NFR BLD: 0.8 %
LYMPHOCYTES # BLD AUTO: 0.94 X10(3) UL (ref 1–4)
LYMPHOCYTES NFR BLD AUTO: 7.9 %
M PROTEIN MFR SERPL ELPH: 6.5 G/DL (ref 6.4–8.2)
MCH RBC QN AUTO: 33.9 PG (ref 26–34)
MCHC RBC AUTO-ENTMCNC: 32 G/DL (ref 31–37)
MCV RBC AUTO: 106.1 FL
MONOCYTES # BLD AUTO: 0.52 X10(3) UL (ref 0.1–1)
MONOCYTES NFR BLD AUTO: 4.4 %
NEUTROPHILS # BLD AUTO: 10.36 X10 (3) UL (ref 1.5–7.7)
NEUTROPHILS # BLD AUTO: 10.36 X10(3) UL (ref 1.5–7.7)
NEUTROPHILS NFR BLD AUTO: 86.8 %
OSMOLALITY SERPL CALC.SUM OF ELEC: 288 MOSM/KG (ref 275–295)
PLATELET # BLD AUTO: 208 10(3)UL (ref 150–450)
POTASSIUM SERPL-SCNC: 5.2 MMOL/L (ref 3.5–5.1)
RBC # BLD AUTO: 3.27 X10(6)UL
SODIUM SERPL-SCNC: 136 MMOL/L (ref 136–145)
WBC # BLD AUTO: 11.9 X10(3) UL (ref 4–11)

## 2021-02-03 PROCEDURE — 99212 OFFICE O/P EST SF 10 MIN: CPT | Performed by: UROLOGY

## 2021-02-03 PROCEDURE — 99214 OFFICE O/P EST MOD 30 MIN: CPT | Performed by: NURSE PRACTITIONER

## 2021-02-03 PROCEDURE — 74178 CT ABD&PLV WO CNTR FLWD CNTR: CPT | Performed by: UROLOGY

## 2021-02-03 NOTE — PLAN OF CARE
Pt is alert and oriented x4. Lung sound clear pt on 2L NC abdomen soft, round, and distended with active bowel sounds. Pt denies any pain, bladder biopsy on hold due to INR level increased, CBI clamped urine yellow clear, NPO after midnight.   PIV in righ

## 2021-02-03 NOTE — PROGRESS NOTES
NURSING DISCHARGE NOTE    Discharged Nursing home via Wheelchair. Accompanied by Family member  Belongings Taken by patient/family. Pt assessed and ready for discharge.   Discharge instructions and medications given and reviewed with patient and lauro

## 2021-02-03 NOTE — TELEPHONE ENCOUNTER
Received a call from Dr. Woodroe Alpers asking to schedule this patient for cysto and tur bladder biopsies. After talking with OR and Dr. Woodroe Alpers, patient was scheduled for 2/12/21 at 2:30 at [address]    Talked with daughters Ross Drew and Anita Trujillo on a 3 way

## 2021-02-03 NOTE — PROGRESS NOTES
Oates is out and patient is voiding. Urine is clear. CT shows bladder thickening. Scheduling cysto and bladder biopsy next Friday. OK for discharge today if medically OK.

## 2021-02-03 NOTE — PROGRESS NOTES
[facility] Progress Note    Subjective:   OOb in chair on exam, eager to go home today. She curretnly denies CP, dizzinesss, SOB, or palpitations. Remains in Afib rates .   No further hematuria, Lashon removed this am, Xarelto on hold f 01/11/2013    PT 14.8 01/10/2013    PT 14.4 01/09/2013    INR 1.48 (H) 02/02/2021    INR 2.50 (H) 02/01/2021    INR 1.54 (H) 01/24/2021       Medications:    No current facility-administered medications on file prior to encounter.      •  fluticasone-salmet Potassium Chloride ER 10 MEQ Oral Tab CR, Take 10 mEq by mouth daily. , Disp: , Rfl:     •  CRANBERRY EXTRACT OR, Take 500 mg by mouth 2 (two) times a day.  , Disp: , Rfl:     •  Probiotic Product (ALIGN) Oral Cap, Take 1 capsule by mouth daily. , Disp: , R

## 2021-02-03 NOTE — PROGRESS NOTES
** I AGREE WITH CHARTING OF ARIEL ZACARIAS RN.     1130: DR. SHARIF SAW PATIENT AND STATED HE IS OK WITH HER DISCHARGING TODAY. SURGERY SCHEDULER TO SET UP PROCEDURE FOR NEXT WEEK.

## 2021-02-04 ENCOUNTER — TELEPHONE (OUTPATIENT)
Dept: SURGERY | Facility: CLINIC | Age: 86
End: 2021-02-04

## 2021-02-04 ENCOUNTER — TELEPHONE (OUTPATIENT)
Dept: CARDIOLOGY | Age: 86
End: 2021-02-04

## 2021-02-04 LAB
ATRIAL RATE: 104 BPM
Q-T INTERVAL: 342 MS
QRS DURATION: 66 MS
QTC CALCULATION (BEZET): 452 MS
R AXIS: 39 DEGREES
T AXIS: -4 DEGREES
VENTRICULAR RATE: 105 BPM

## 2021-02-04 RX ORDER — DRONEDARONE 400 MG/1
TABLET, FILM COATED ORAL
Qty: 60 TABLET | Refills: 0 | Status: SHIPPED | OUTPATIENT
Start: 2021-02-04

## 2021-02-04 NOTE — TELEPHONE ENCOUNTER
Received call from [facility] at [facility] at [address] to revise the consent as I used the abbreviation for TUR. Completed this date.

## 2021-02-04 NOTE — DISCHARGE SUMMARY
[facility]  Discharge Summary    Tarsha Odonnell Patient Status:  Observation    1933 MRN [id number]   [facility] 3NW-A Attending No att. providers found   Hosp Day # 0 PCP Nani Aguilar MD     Date of Admission: 2021    Richard times daily with meals. , Historical    Potassium Chloride ER 10 MEQ Oral Tab CR  Take 10 mEq by mouth daily. , Historical    CRANBERRY EXTRACT OR  Take 500 mg by mouth 2 (two) times a day.  , Historical    Probiotic Product (ALIGN) Oral Cap  Take 1 capsule

## 2021-02-07 ENCOUNTER — ANESTHESIA EVENT (OUTPATIENT)
Dept: SURGERY | Facility: HOSPITAL | Age: 86
End: 2021-02-07
Payer: MEDICARE

## 2021-02-09 ENCOUNTER — TELEPHONE (OUTPATIENT)
Dept: CARDIOLOGY | Age: 86
End: 2021-02-09

## 2021-02-12 ENCOUNTER — HOSPITAL ENCOUNTER (OUTPATIENT)
Facility: HOSPITAL | Age: 86
Discharge: HOME | End: 2021-02-13
Attending: UROLOGY | Admitting: UROLOGY
Payer: MEDICARE

## 2021-02-12 ENCOUNTER — ANESTHESIA (OUTPATIENT)
Dept: SURGERY | Facility: HOSPITAL | Age: 86
End: 2021-02-12
Payer: MEDICARE

## 2021-02-12 DIAGNOSIS — C67.9 MALIGNANT NEOPLASM OF URINARY BLADDER, UNSPECIFIED SITE (HCC): ICD-10-CM

## 2021-02-12 DIAGNOSIS — C67.9 BLADDER CANCER (HCC): Primary | ICD-10-CM

## 2021-02-12 LAB
GLUCOSE BLD-MCNC: 98 MG/DL (ref 70–99)
POTASSIUM SERPL-SCNC: 4.9 MMOL/L (ref 3.5–5.1)

## 2021-02-12 PROCEDURE — 0TBB8ZX EXCISION OF BLADDER, VIA NATURAL OR ARTIFICIAL OPENING ENDOSCOPIC, DIAGNOSTIC: ICD-10-PCS | Performed by: UROLOGY

## 2021-02-12 PROCEDURE — 52240 CYSTOSCOPY AND TREATMENT: CPT | Performed by: UROLOGY

## 2021-02-12 RX ORDER — PANTOPRAZOLE SODIUM 20 MG/1
20 TABLET, DELAYED RELEASE ORAL
Status: DISCONTINUED | OUTPATIENT
Start: 2021-02-13 | End: 2021-02-13

## 2021-02-12 RX ORDER — DEXTROSE MONOHYDRATE 25 G/50ML
50 INJECTION, SOLUTION INTRAVENOUS
Status: DISCONTINUED | OUTPATIENT
Start: 2021-02-12 | End: 2021-02-12 | Stop reason: HOSPADM

## 2021-02-12 RX ORDER — MEPERIDINE HYDROCHLORIDE 25 MG/ML
12.5 INJECTION INTRAMUSCULAR; INTRAVENOUS; SUBCUTANEOUS AS NEEDED
Status: DISCONTINUED | OUTPATIENT
Start: 2021-02-12 | End: 2021-02-12 | Stop reason: HOSPADM

## 2021-02-12 RX ORDER — NYSTATIN 100000 U/G
CREAM TOPICAL 2 TIMES DAILY
Status: DISCONTINUED | OUTPATIENT
Start: 2021-02-12 | End: 2021-02-13

## 2021-02-12 RX ORDER — FUROSEMIDE 40 MG/1
40 TABLET ORAL DAILY
Status: DISCONTINUED | OUTPATIENT
Start: 2021-02-13 | End: 2021-02-13

## 2021-02-12 RX ORDER — FLUTICASONE PROPIONATE 50 MCG
2 SPRAY, SUSPENSION (ML) NASAL 2 TIMES DAILY
Status: DISCONTINUED | OUTPATIENT
Start: 2021-02-12 | End: 2021-02-13

## 2021-02-12 RX ORDER — PHENYLEPHRINE HCL 10 MG/ML
VIAL (ML) INJECTION AS NEEDED
Status: DISCONTINUED | OUTPATIENT
Start: 2021-02-12 | End: 2021-02-12 | Stop reason: SURG

## 2021-02-12 RX ORDER — CARVEDILOL 12.5 MG/1
12.5 TABLET ORAL 2 TIMES DAILY WITH MEALS
Status: DISCONTINUED | OUTPATIENT
Start: 2021-02-12 | End: 2021-02-13

## 2021-02-12 RX ORDER — ALBUTEROL SULFATE 90 UG/1
2 AEROSOL, METERED RESPIRATORY (INHALATION) EVERY 6 HOURS PRN
Status: DISCONTINUED | OUTPATIENT
Start: 2021-02-12 | End: 2021-02-13

## 2021-02-12 RX ORDER — HYDROCODONE BITARTRATE AND ACETAMINOPHEN 5; 325 MG/1; MG/1
1 TABLET ORAL EVERY 4 HOURS PRN
Status: DISCONTINUED | OUTPATIENT
Start: 2021-02-12 | End: 2021-02-13

## 2021-02-12 RX ORDER — ONDANSETRON 2 MG/ML
4 INJECTION INTRAMUSCULAR; INTRAVENOUS AS NEEDED
Status: DISCONTINUED | OUTPATIENT
Start: 2021-02-12 | End: 2021-02-12 | Stop reason: HOSPADM

## 2021-02-12 RX ORDER — HYDROMORPHONE HYDROCHLORIDE 1 MG/ML
0.2 INJECTION, SOLUTION INTRAMUSCULAR; INTRAVENOUS; SUBCUTANEOUS EVERY 5 MIN PRN
Status: DISCONTINUED | OUTPATIENT
Start: 2021-02-12 | End: 2021-02-12 | Stop reason: HOSPADM

## 2021-02-12 RX ORDER — DEXTROSE AND SODIUM CHLORIDE 5; .45 G/100ML; G/100ML
INJECTION, SOLUTION INTRAVENOUS CONTINUOUS
Status: DISCONTINUED | OUTPATIENT
Start: 2021-02-12 | End: 2021-02-13

## 2021-02-12 RX ORDER — MELATONIN
325 DAILY
Status: DISCONTINUED | OUTPATIENT
Start: 2021-02-13 | End: 2021-02-13

## 2021-02-12 RX ORDER — LIDOCAINE HYDROCHLORIDE 10 MG/ML
INJECTION, SOLUTION EPIDURAL; INFILTRATION; INTRACAUDAL; PERINEURAL AS NEEDED
Status: DISCONTINUED | OUTPATIENT
Start: 2021-02-12 | End: 2021-02-12 | Stop reason: SURG

## 2021-02-12 RX ORDER — ACETAMINOPHEN 500 MG
1000 TABLET ORAL EVERY 6 HOURS PRN
Status: DISCONTINUED | OUTPATIENT
Start: 2021-02-12 | End: 2021-02-13

## 2021-02-12 RX ORDER — ONDANSETRON 2 MG/ML
INJECTION INTRAMUSCULAR; INTRAVENOUS AS NEEDED
Status: DISCONTINUED | OUTPATIENT
Start: 2021-02-12 | End: 2021-02-12 | Stop reason: SURG

## 2021-02-12 RX ORDER — NALOXONE HYDROCHLORIDE 0.4 MG/ML
80 INJECTION, SOLUTION INTRAMUSCULAR; INTRAVENOUS; SUBCUTANEOUS AS NEEDED
Status: DISCONTINUED | OUTPATIENT
Start: 2021-02-12 | End: 2021-02-12 | Stop reason: HOSPADM

## 2021-02-12 RX ORDER — ACETAMINOPHEN 325 MG/1
650 TABLET ORAL EVERY 4 HOURS PRN
Status: DISCONTINUED | OUTPATIENT
Start: 2021-02-12 | End: 2021-02-13

## 2021-02-12 RX ORDER — LIDOCAINE HYDROCHLORIDE 20 MG/ML
JELLY TOPICAL AS NEEDED
Status: DISCONTINUED | OUTPATIENT
Start: 2021-02-12 | End: 2021-02-12 | Stop reason: HOSPADM

## 2021-02-12 RX ORDER — SODIUM CHLORIDE, SODIUM LACTATE, POTASSIUM CHLORIDE, CALCIUM CHLORIDE 600; 310; 30; 20 MG/100ML; MG/100ML; MG/100ML; MG/100ML
INJECTION, SOLUTION INTRAVENOUS CONTINUOUS
Status: DISCONTINUED | OUTPATIENT
Start: 2021-02-12 | End: 2021-02-12 | Stop reason: HOSPADM

## 2021-02-12 RX ORDER — HYDROCODONE BITARTRATE AND ACETAMINOPHEN 5; 325 MG/1; MG/1
2 TABLET ORAL EVERY 4 HOURS PRN
Status: DISCONTINUED | OUTPATIENT
Start: 2021-02-12 | End: 2021-02-13

## 2021-02-12 RX ORDER — ACETAMINOPHEN 500 MG
1000 TABLET ORAL EVERY 6 HOURS PRN
COMMUNITY
End: 2021-03-24

## 2021-02-12 RX ORDER — SODIUM CHLORIDE, SODIUM LACTATE, POTASSIUM CHLORIDE, CALCIUM CHLORIDE 600; 310; 30; 20 MG/100ML; MG/100ML; MG/100ML; MG/100ML
INJECTION, SOLUTION INTRAVENOUS CONTINUOUS
Status: DISCONTINUED | OUTPATIENT
Start: 2021-02-12 | End: 2021-02-12

## 2021-02-12 RX ORDER — ALBUTEROL SULFATE 2.5 MG/3ML
2.5 SOLUTION RESPIRATORY (INHALATION) EVERY 6 HOURS PRN
Status: DISCONTINUED | OUTPATIENT
Start: 2021-02-12 | End: 2021-02-13

## 2021-02-12 RX ORDER — LISINOPRIL 10 MG/1
10 TABLET ORAL DAILY
Status: DISCONTINUED | OUTPATIENT
Start: 2021-02-12 | End: 2021-02-13

## 2021-02-12 RX ORDER — POTASSIUM CHLORIDE 750 MG/1
10 TABLET, EXTENDED RELEASE ORAL DAILY
Status: DISCONTINUED | OUTPATIENT
Start: 2021-02-12 | End: 2021-02-13

## 2021-02-12 RX ORDER — ACETAMINOPHEN 500 MG
1000 TABLET ORAL ONCE
Status: DISCONTINUED | OUTPATIENT
Start: 2021-02-12 | End: 2021-02-12

## 2021-02-12 RX ORDER — CETIRIZINE HYDROCHLORIDE 10 MG/1
10 TABLET ORAL DAILY
Status: DISCONTINUED | OUTPATIENT
Start: 2021-02-13 | End: 2021-02-13

## 2021-02-12 RX ORDER — HYDROCODONE BITARTRATE AND ACETAMINOPHEN 5; 325 MG/1; MG/1
1 TABLET ORAL AS NEEDED
Status: DISCONTINUED | OUTPATIENT
Start: 2021-02-12 | End: 2021-02-12 | Stop reason: HOSPADM

## 2021-02-12 RX ORDER — CEFAZOLIN SODIUM/WATER 2 G/20 ML
2 SYRINGE (ML) INTRAVENOUS ONCE
Status: COMPLETED | OUTPATIENT
Start: 2021-02-12 | End: 2021-02-12

## 2021-02-12 RX ORDER — GARLIC EXTRACT 500 MG
1 CAPSULE ORAL DAILY
Status: DISCONTINUED | OUTPATIENT
Start: 2021-02-13 | End: 2021-02-13

## 2021-02-12 RX ORDER — DEXAMETHASONE SODIUM PHOSPHATE 4 MG/ML
VIAL (ML) INJECTION AS NEEDED
Status: DISCONTINUED | OUTPATIENT
Start: 2021-02-12 | End: 2021-02-12 | Stop reason: SURG

## 2021-02-12 RX ORDER — HYDROCODONE BITARTRATE AND ACETAMINOPHEN 5; 325 MG/1; MG/1
2 TABLET ORAL AS NEEDED
Status: DISCONTINUED | OUTPATIENT
Start: 2021-02-12 | End: 2021-02-12 | Stop reason: HOSPADM

## 2021-02-12 RX ADMIN — DEXAMETHASONE SODIUM PHOSPHATE 8 MG: 4 MG/ML VIAL (ML) INJECTION at 15:12:00

## 2021-02-12 RX ADMIN — SODIUM CHLORIDE, SODIUM LACTATE, POTASSIUM CHLORIDE, CALCIUM CHLORIDE: 600; 310; 30; 20 INJECTION, SOLUTION INTRAVENOUS at 15:59:00

## 2021-02-12 RX ADMIN — PHENYLEPHRINE HCL 100 MCG: 10 MG/ML VIAL (ML) INJECTION at 15:36:00

## 2021-02-12 RX ADMIN — ONDANSETRON 4 MG: 2 INJECTION INTRAMUSCULAR; INTRAVENOUS at 15:46:00

## 2021-02-12 RX ADMIN — LIDOCAINE HYDROCHLORIDE 50 MG: 10 INJECTION, SOLUTION EPIDURAL; INFILTRATION; INTRACAUDAL; PERINEURAL at 15:09:00

## 2021-02-12 RX ADMIN — PHENYLEPHRINE HCL 100 MCG: 10 MG/ML VIAL (ML) INJECTION at 15:24:00

## 2021-02-12 RX ADMIN — CEFAZOLIN SODIUM/WATER 2 G: 2 G/20 ML SYRINGE (ML) INTRAVENOUS at 15:12:00

## 2021-02-12 RX ADMIN — PHENYLEPHRINE HCL 50 MCG: 10 MG/ML VIAL (ML) INJECTION at 15:19:00

## 2021-02-12 RX ADMIN — PHENYLEPHRINE HCL 100 MCG: 10 MG/ML VIAL (ML) INJECTION at 15:31:00

## 2021-02-12 NOTE — ANESTHESIA PREPROCEDURE EVALUATION
PRE-OP EVALUATION    Patient Name: Henna Harris    Pre-op Diagnosis: Malignant neoplasm of urinary bladder, unspecified site [facility] [C67.9]    Procedure(s):  Cystoscopy, transurethral resection of bladder with biopsies    Surgeon(s) and Role:     * Jessica •  lisinopril 10 MG Oral Tab, Take 10 mg by mouth daily. , Disp: , Rfl: , 2/11/2021 at 2000    •  furosemide 20 MG Oral Tab, Take 40 mg by mouth daily. , Disp: , Rfl: , 2/12/2021 at 0600    •  Ferrous Sulfate ER (IRON SLOW RELEASE) 140 (45 Fe) MG Oral Tab CR Endo/Other      (+) diabetes         (+) anemia        (+) thrombocytopenia           Pulmonary  Comment: Former smoker, quit 1987  Chronic phlegm      (+) COPD     (+) pneumonia              Neuro/Psych  Comment: Lumbar spinal stenosis      (+) anxiety MCH 33.9 02/03/2021    MCHC 32.0 02/03/2021    RDW 13.8 02/03/2021    .0 02/03/2021     Lab Results   Component Value Date     02/03/2021    K 5.2 (H) 02/03/2021     02/03/2021    CO2 27.0 02/03/2021    BUN 27 (H) 02/03/2021    RALF

## 2021-02-12 NOTE — OPERATIVE REPORT
Operative Note    Patient Name: Naz Crocker    Date of Procedure: 2/12/2021    Preoperative Diagnosis: Malignant neoplasm of urinary bladder, unspecified site [facility]) [C67.9]    Postoperative Diagnosis: Malignant neoplasm of urinary bladder, unspecif any mass in the pelvis. The patient tolerated the procedure well and was returned to recovery room in stable and satisfactory condition and will be admitted into the hospital for observation overnight. Johnella Frankel.  Eulalia Mcgee M.D.

## 2021-02-12 NOTE — PLAN OF CARE
Problem: Patient/Family Goals  Goal: Patient/Family Long Term Goal  Description: Patient's Long Term Goal: discharge    Interventions:  - pt states poc: home tomorrow  - See additional Care Plan goals for specific interventions  Outcome: Puneet Oro

## 2021-02-12 NOTE — H&P
[address]  [address]  [phone]       Shelly Mabry Patient Status:  Hospital Outpatient Surgery    1933 MRN [id number]   Location [address] [place] [address] Attending Cindy Perez MD   [facility] Day [facility])    • Urethral stenosis 03-    dr. Ruiz Every   • Venous insufficiency    • Ventral hernia 8/11/2014   • Viral pneumonia 3/20/2020     Past Surgical History:   Procedure Laterality Date   • APPENDECTOMY     • CHOLECYSTECTOMY     • COLONOSCOPY HIVES    Comment:SENSATIVITY    Medications:    Current Facility-Administered Medications:   •  lactated ringers infusion, , Intravenous, Continuous  •  ceFAZolin sodium (ANCEF/KEFZOL) 2 GM/20ML premix IV syringe 2 g, 2 g, Intravenous, Once    Review of Sy

## 2021-02-12 NOTE — ANESTHESIA PROCEDURE NOTES
Airway  Date/Time: 2/12/2021 3:10 PM  Urgency: elective      General Information and Staff    Patient location during procedure: OR  Anesthesiologist: Mandi Carlson MD  Performed: anesthesiologist     Indications and Patient Condition  Indications for airway

## 2021-02-12 NOTE — ANESTHESIA POSTPROCEDURE EVALUATION
[address] Patient Status:  Hospital Outpatient Surgery   Age/Gender 80year old female MRN [id number]   Location [facility] Attending Néstor Angeles MD   Hosp Day # 0 PCP Rom Chowdhury MD       [facility]

## 2021-02-13 VITALS
RESPIRATION RATE: 26 BRPM | OXYGEN SATURATION: 91 % | BODY MASS INDEX: 36.75 KG/M2 | DIASTOLIC BLOOD PRESSURE: 52 MMHG | TEMPERATURE: 98 F | WEIGHT: 207.44 LBS | HEART RATE: 85 BPM | HEIGHT: 63 IN | SYSTOLIC BLOOD PRESSURE: 127 MMHG

## 2021-02-13 LAB
ALBUMIN SERPL-MCNC: 2.7 G/DL (ref 3.4–5)
ALBUMIN/GLOB SERPL: 0.7 {RATIO} (ref 1–2)
ALP LIVER SERPL-CCNC: 76 U/L
ALT SERPL-CCNC: 17 U/L
ANION GAP SERPL CALC-SCNC: 6 MMOL/L (ref 0–18)
AST SERPL-CCNC: 16 U/L (ref 15–37)
BASOPHILS # BLD AUTO: 0 X10(3) UL (ref 0–0.2)
BASOPHILS NFR BLD AUTO: 0 %
BILIRUB SERPL-MCNC: 0.3 MG/DL (ref 0.1–2)
BUN BLD-MCNC: 24 MG/DL (ref 7–18)
BUN/CREAT SERPL: 25 (ref 10–20)
CALCIUM BLD-MCNC: 8.9 MG/DL (ref 8.5–10.1)
CHLORIDE SERPL-SCNC: 108 MMOL/L (ref 98–112)
CO2 SERPL-SCNC: 25 MMOL/L (ref 21–32)
CREAT BLD-MCNC: 0.96 MG/DL
DEPRECATED RDW RBC AUTO: 53.1 FL (ref 35.1–46.3)
EOSINOPHIL # BLD AUTO: 0 X10(3) UL (ref 0–0.7)
EOSINOPHIL NFR BLD AUTO: 0 %
ERYTHROCYTE [DISTWIDTH] IN BLOOD BY AUTOMATED COUNT: 13.8 % (ref 11–15)
GLOBULIN PLAS-MCNC: 3.9 G/DL (ref 2.8–4.4)
GLUCOSE BLD-MCNC: 117 MG/DL (ref 70–99)
HCT VFR BLD AUTO: 39.2 %
HGB BLD-MCNC: 12.6 G/DL
IMM GRANULOCYTES # BLD AUTO: 0.05 X10(3) UL (ref 0–1)
IMM GRANULOCYTES NFR BLD: 0.6 %
LYMPHOCYTES # BLD AUTO: 0.65 X10(3) UL (ref 1–4)
LYMPHOCYTES NFR BLD AUTO: 8.4 %
M PROTEIN MFR SERPL ELPH: 6.6 G/DL (ref 6.4–8.2)
MCH RBC QN AUTO: 33.6 PG (ref 26–34)
MCHC RBC AUTO-ENTMCNC: 32.1 G/DL (ref 31–37)
MCV RBC AUTO: 104.5 FL
MONOCYTES # BLD AUTO: 0.2 X10(3) UL (ref 0.1–1)
MONOCYTES NFR BLD AUTO: 2.6 %
NEUTROPHILS # BLD AUTO: 6.82 X10 (3) UL (ref 1.5–7.7)
NEUTROPHILS # BLD AUTO: 6.82 X10(3) UL (ref 1.5–7.7)
NEUTROPHILS NFR BLD AUTO: 88.4 %
OSMOLALITY SERPL CALC.SUM OF ELEC: 293 MOSM/KG (ref 275–295)
PLATELET # BLD AUTO: 162 10(3)UL (ref 150–450)
POTASSIUM SERPL-SCNC: 4.7 MMOL/L (ref 3.5–5.1)
RBC # BLD AUTO: 3.75 X10(6)UL
SODIUM SERPL-SCNC: 139 MMOL/L (ref 136–145)
WBC # BLD AUTO: 7.7 X10(3) UL (ref 4–11)

## 2021-02-13 PROCEDURE — 99212 OFFICE O/P EST SF 10 MIN: CPT | Performed by: UROLOGY

## 2021-02-13 RX ORDER — NYSTATIN 100000 U/G
CREAM TOPICAL 2 TIMES DAILY
Status: DISCONTINUED | OUTPATIENT
Start: 2021-02-13 | End: 2021-02-13

## 2021-02-13 RX ORDER — SULFAMETHOXAZOLE AND TRIMETHOPRIM 800; 160 MG/1; MG/1
1 TABLET ORAL 2 TIMES DAILY
Qty: 6 TABLET | Refills: 0 | Status: SHIPPED | OUTPATIENT
Start: 2021-02-13 | End: 2021-02-16

## 2021-02-13 NOTE — PROGRESS NOTES
Pt resting in bed, easy non labored breathing on ra. Ng to lis. Vs wnl. Iv fluids infusing without difficulty. cpox and telemetry in place. Voids adequate amount. Transverse incision and lap sites x2 s.s. pt tolerating sips of clear.  Pm meds admin plan of

## 2021-02-13 NOTE — DISCHARGE SUMMARY
[facility]    Discharge Summary    Naz Crocker Patient Status:  Outpatient in a Bed    1933 MRN [id number]   [facility] [place] 3NW-A Attending Damari Valente MD   Hosp Day # 0 PCP Ratna Ho MD     Date of Admission:  Surgical Procedures     Case IDs Date Procedure Surgeon Location Status    [id number] 2/12/21 CYSTOSCOPY BLADDER BIOPSY/ FULGURATION Gal JOLLY MD [facility] MAIN OR Karthik        Pending Labs     Order Current Status    SURGICAL PATHOLOGY TISSUE In process mouth every morning before breakfast.    !! acetaminophen 325 MG Oral Tab  Take 2 tablets (650 mg total) by mouth every 6 (six) hours as needed. CRANBERRY EXTRACT OR  Take 500 mg by mouth 2 (two) times a day.       !! - Potential duplicate medications fo

## 2021-02-13 NOTE — PLAN OF CARE
Pt a&o x 4.   Cheerful & cooperative w/ care  Maintaining sats on ra  Tolerating cardiac diet  Incont at times, using adult briefs  Up w/ walker & sba  Denies c/o pain        Problem: Patient/Family Goals  Goal: Patient/Family Long Term Goal  Description: P

## 2021-02-13 NOTE — PROGRESS NOTES
Pt discharged home. Discharge instructions given to pt & pt's daughter, including  rx for bactrium @ her [facility]'s pharmacy,  rx for apixaban thru [address], review of home medication, diet, hydration, activity & f/u care.

## 2021-02-13 NOTE — PROGRESS NOTES
[facility]  Urology Consult Follow-Up Note    Neto Huang Patient Status:  Outpatient in a Bed    1933 MRN [id number]   [facility] 3NW-A Attending Leatha Hahn MD   Hosp Day # 0 PCP Saundra Maurice MD

## 2021-02-13 NOTE — H&P
[facility] [address] Patient Status:  Inpatient    1933 MRN [id number]   [facility] 3NW-A Attending Lawrence Cordero MD   Hosp Day # 1 PCP Aden Wheeler MD     History of Present Illness:  Sirisha Rosales APPENDECTOMY     • CHOLECYSTECTOMY     • COLONOSCOPY      3/2014   • CYSTOSCOPY URETEROSCOPY N/A 2/2/2021    Performed by Natalio Landin MD at [facility] MAIN OR   • EGD     • HERNIA SURGERY     • HYSTERECTOMY     • LAPAROSCOPIC CHOLECYSTECTOMY      3/2014   • fluticasone-salmeterol 250-50 MCG/DOSE Inhalation Aerosol Powder, Breath Activated, Inhale 1 puff into the lungs every 12 (twelve) hours. , Disp: , Rfl: , 2/11/2021 at Unknown time    •  Albuterol Sulfate HFA (PROAIR HFA) 108 (90 Base) MCG/ACT Inhalation Ae Rfl: 0, 2/12/2021 at 0600    •  [DISCONTINUED] rivaroxaban (XARELTO) 15 MG Oral Tab, Take 1 tablet (15 mg total) by mouth daily with food.  Hold until cystoscopy and biopsy, Disp: 90 tablet, Rfl: 1, 2/1/2021    •  acetaminophen 325 MG Oral Tab, Take 2 table BLADDER ONLY ([id number]=[id number])  COMPARISON:  None. INDICATIONS:  uti vaginal bleeding  TECHNIQUE:  Transabdominal gray scale ultrasound imaging of the bladder was performed. Routine technique was utilized.    PATIENT STATED HISTORY: (As transcribed by [employer] characterize. ADRENALS:  No mass or enlargement. AORTA/VASCULAR:  Atherosclerosis. No aneurysm or dissection. RETROPERITONEUM:  No mass or adenopathy. BOWEL/MESENTERY:  Normal caliber small and large bowel. Diverticular disease.   ABDOMINAL WALL:  Sta bladder which contains internal debris noted on 1/23/2021 bladder sonogram.    Dictated by (CST): Pavan Vang MD on 1/25/2021 at 11:47 AM     Finalized by (CST): Pavan Vang MD on 1/25/2021 at 11:49 AM       Xr Or - N/c    Result Date: 2/2/2021            MS

## 2021-02-18 ENCOUNTER — TELEPHONE (OUTPATIENT)
Dept: SURGERY | Facility: CLINIC | Age: 86
End: 2021-02-18

## 2021-02-18 NOTE — TELEPHONE ENCOUNTER
Televisit conference with patient and daughters regarding biopsy report in the next couple of weeks.

## 2021-02-19 NOTE — TELEPHONE ENCOUNTER
Televisit conference with patient and daughters regarding biopsy report in the next couple of weeks. S/P TURB with biopsies on 2/12. Agreeable to 3/9 Tues 145 pm appointment at the office with both daughters. All questions answered.

## 2021-02-25 ENCOUNTER — NURSE ONLY (OUTPATIENT)
Dept: LAB | Age: 86
End: 2021-02-25
Attending: INTERNAL MEDICINE
Payer: MEDICARE

## 2021-02-25 DIAGNOSIS — R35.0 URINARY FREQUENCY: ICD-10-CM

## 2021-02-25 DIAGNOSIS — N30.01 ACUTE CYSTITIS WITH HEMATURIA: ICD-10-CM

## 2021-02-25 LAB
ADEQUACY OF SPECIMEN: ADEQUATE
BILIRUB UR QL STRIP.AUTO: NEGATIVE
COLOR UR AUTO: YELLOW
GLUCOSE UR STRIP.AUTO-MCNC: NEGATIVE MG/DL
NITRITE UR QL STRIP.AUTO: NEGATIVE
PH UR STRIP.AUTO: 8 [PH] (ref 5–8)
PROT UR STRIP.AUTO-MCNC: >=500 MG/DL
RBC #/AREA URNS AUTO: >10 /HPF
SP GR UR STRIP.AUTO: 1.02 (ref 1–1.03)
UROBILINOGEN UR STRIP.AUTO-MCNC: <2 MG/DL
WBC #/AREA URNS AUTO: >50 /HPF
WBC CLUMPS UR QL AUTO: PRESENT /HPF

## 2021-02-25 PROCEDURE — 81001 URINALYSIS AUTO W/SCOPE: CPT

## 2021-02-25 PROCEDURE — 87088 URINE BACTERIA CULTURE: CPT

## 2021-02-25 PROCEDURE — 87086 URINE CULTURE/COLONY COUNT: CPT

## 2021-02-25 PROCEDURE — 87186 SC STD MICRODIL/AGAR DIL: CPT

## 2021-02-26 ENCOUNTER — TELEPHONE (OUTPATIENT)
Dept: SURGERY | Facility: CLINIC | Age: 86
End: 2021-02-26

## 2021-02-26 ENCOUNTER — TELEPHONE (OUTPATIENT)
Dept: CARDIOLOGY | Age: 86
End: 2021-02-26

## 2021-02-26 NOTE — TELEPHONE ENCOUNTER
gricel from dr. Sotero Miller' office called. Pt has a positive urinalysis. Want to discuss starting pt on an antibiotic.   Please call

## 2021-02-26 NOTE — TELEPHONE ENCOUNTER
RN called daughter in response to her call: \"Pt's daughter called requesting to speak to the nurse. Pt is having pain. Pt has cancer. Please call \"     She reports that patient is still at [facility].  Patient verbalized odor in urine

## 2021-02-26 NOTE — TELEPHONE ENCOUNTER
RN called Thalia from Dr Luis Arboleda in response to her call and update of the urinalysis and culture. She said that patient has Proteus Mirabilis and is being started for Bactrim. Asking if Dr Stef Oswald is agreeable.      URINE CULTURE >100,000 CFU/ML Proteus mirab

## 2021-02-26 NOTE — TELEPHONE ENCOUNTER
Pt's daughter called requesting to speak to the nurse. Pt is having pain. Pt has cancer.    Please call

## 2021-03-01 ENCOUNTER — HOSPITAL ENCOUNTER (INPATIENT)
Facility: HOSPITAL | Age: 86
LOS: 2 days | Discharge: NURSING, REHAB OR LONG-TERM CARE | DRG: 669 | End: 2021-03-05
Attending: EMERGENCY MEDICINE | Admitting: INTERNAL MEDICINE
Payer: MEDICARE

## 2021-03-01 DIAGNOSIS — R31.0 GROSS HEMATURIA: Primary | ICD-10-CM

## 2021-03-01 DIAGNOSIS — N17.9 AKI (ACUTE KIDNEY INJURY) (HCC): ICD-10-CM

## 2021-03-01 PROBLEM — R31.9 HEMATURIA: Status: ACTIVE | Noted: 2021-03-01

## 2021-03-01 LAB
ALBUMIN SERPL-MCNC: 2.8 G/DL (ref 3.4–5)
ALBUMIN/GLOB SERPL: 0.7 {RATIO} (ref 1–2)
ALP LIVER SERPL-CCNC: 82 U/L
ALT SERPL-CCNC: 20 U/L
ANION GAP SERPL CALC-SCNC: 5 MMOL/L (ref 0–18)
AST SERPL-CCNC: 22 U/L (ref 15–37)
BASOPHILS # BLD AUTO: 0.05 X10(3) UL (ref 0–0.2)
BASOPHILS NFR BLD AUTO: 0.5 %
BILIRUB SERPL-MCNC: 0.2 MG/DL (ref 0.1–2)
BILIRUB UR QL STRIP.AUTO: NEGATIVE
BUN BLD-MCNC: 30 MG/DL (ref 7–18)
BUN/CREAT SERPL: 20.4 (ref 10–20)
CALCIUM BLD-MCNC: 9.5 MG/DL (ref 8.5–10.1)
CHLORIDE SERPL-SCNC: 110 MMOL/L (ref 98–112)
CO2 SERPL-SCNC: 23 MMOL/L (ref 21–32)
CREAT BLD-MCNC: 1.47 MG/DL
DEPRECATED RDW RBC AUTO: 56.8 FL (ref 35.1–46.3)
EOSINOPHIL # BLD AUTO: 0.42 X10(3) UL (ref 0–0.7)
EOSINOPHIL NFR BLD AUTO: 4.4 %
ERYTHROCYTE [DISTWIDTH] IN BLOOD BY AUTOMATED COUNT: 14.6 % (ref 11–15)
GLOBULIN PLAS-MCNC: 4.1 G/DL (ref 2.8–4.4)
GLUCOSE BLD-MCNC: 145 MG/DL (ref 70–99)
GLUCOSE UR STRIP.AUTO-MCNC: 100 MG/DL
HCT VFR BLD AUTO: 37 %
HGB BLD-MCNC: 12.1 G/DL
IMM GRANULOCYTES # BLD AUTO: 0.06 X10(3) UL (ref 0–1)
IMM GRANULOCYTES NFR BLD: 0.6 %
INR BLD: 1.22 (ref 0.89–1.11)
KETONES UR STRIP.AUTO-MCNC: NEGATIVE MG/DL
LEUKOCYTE ESTERASE UR QL STRIP.AUTO: NEGATIVE
LYMPHOCYTES # BLD AUTO: 1.16 X10(3) UL (ref 1–4)
LYMPHOCYTES NFR BLD AUTO: 12.1 %
M PROTEIN MFR SERPL ELPH: 6.9 G/DL (ref 6.4–8.2)
MCH RBC QN AUTO: 34.3 PG (ref 26–34)
MCHC RBC AUTO-ENTMCNC: 32.7 G/DL (ref 31–37)
MCV RBC AUTO: 104.8 FL
MONOCYTES # BLD AUTO: 0.83 X10(3) UL (ref 0.1–1)
MONOCYTES NFR BLD AUTO: 8.7 %
NEUTROPHILS # BLD AUTO: 7.04 X10 (3) UL (ref 1.5–7.7)
NEUTROPHILS # BLD AUTO: 7.04 X10(3) UL (ref 1.5–7.7)
NEUTROPHILS NFR BLD AUTO: 73.7 %
NITRITE UR QL STRIP.AUTO: NEGATIVE
OSMOLALITY SERPL CALC.SUM OF ELEC: 295 MOSM/KG (ref 275–295)
PH UR STRIP.AUTO: 8.5 [PH] (ref 5–8)
PLATELET # BLD AUTO: 194 10(3)UL (ref 150–450)
POTASSIUM SERPL-SCNC: 4.5 MMOL/L (ref 3.5–5.1)
PROT UR STRIP.AUTO-MCNC: >=300 MG/DL
PSA SERPL DL<=0.01 NG/ML-MCNC: 15.8 SECONDS (ref 12.4–14.6)
RBC # BLD AUTO: 3.53 X10(6)UL
RBC #/AREA URNS AUTO: >10 /HPF
RBC #/AREA URNS AUTO: >10 /HPF
SODIUM SERPL-SCNC: 138 MMOL/L (ref 136–145)
SP GR UR STRIP.AUTO: 1.02 (ref 1–1.03)
UROBILINOGEN UR STRIP.AUTO-MCNC: 0.2 MG/DL
WBC # BLD AUTO: 9.6 X10(3) UL (ref 4–11)

## 2021-03-01 RX ORDER — MORPHINE SULFATE 4 MG/ML
4 INJECTION, SOLUTION INTRAMUSCULAR; INTRAVENOUS ONCE
Status: COMPLETED | OUTPATIENT
Start: 2021-03-01 | End: 2021-03-01

## 2021-03-02 ENCOUNTER — ANESTHESIA EVENT (OUTPATIENT)
Dept: SURGERY | Facility: HOSPITAL | Age: 86
DRG: 669 | End: 2021-03-02
Payer: MEDICARE

## 2021-03-02 ENCOUNTER — APPOINTMENT (OUTPATIENT)
Dept: CT IMAGING | Facility: HOSPITAL | Age: 86
DRG: 669 | End: 2021-03-02
Attending: UROLOGY
Payer: MEDICARE

## 2021-03-02 PROBLEM — N17.9 AKI (ACUTE KIDNEY INJURY) (HCC): Status: ACTIVE | Noted: 2021-03-02

## 2021-03-02 LAB — SARS-COV-2 RNA RESP QL NAA+PROBE: NOT DETECTED

## 2021-03-02 PROCEDURE — 74176 CT ABD & PELVIS W/O CONTRAST: CPT | Performed by: INTERNAL MEDICINE

## 2021-03-02 PROCEDURE — 99222 1ST HOSP IP/OBS MODERATE 55: CPT | Performed by: UROLOGY

## 2021-03-02 PROCEDURE — 51700 IRRIGATION OF BLADDER: CPT | Performed by: UROLOGY

## 2021-03-02 RX ORDER — LISINOPRIL 10 MG/1
10 TABLET ORAL NIGHTLY
Status: DISCONTINUED | OUTPATIENT
Start: 2021-03-02 | End: 2021-03-04

## 2021-03-02 RX ORDER — MORPHINE SULFATE 2 MG/ML
2 INJECTION, SOLUTION INTRAMUSCULAR; INTRAVENOUS EVERY 2 HOUR PRN
Status: DISCONTINUED | OUTPATIENT
Start: 2021-03-02 | End: 2021-03-05

## 2021-03-02 RX ORDER — SODIUM CHLORIDE 9 MG/ML
INJECTION, SOLUTION INTRAVENOUS CONTINUOUS
Status: DISCONTINUED | OUTPATIENT
Start: 2021-03-02 | End: 2021-03-04

## 2021-03-02 RX ORDER — MORPHINE SULFATE 4 MG/ML
4 INJECTION, SOLUTION INTRAMUSCULAR; INTRAVENOUS EVERY 2 HOUR PRN
Status: DISCONTINUED | OUTPATIENT
Start: 2021-03-02 | End: 2021-03-05

## 2021-03-02 RX ORDER — GARLIC EXTRACT 500 MG
1 CAPSULE ORAL DAILY
Status: DISCONTINUED | OUTPATIENT
Start: 2021-03-02 | End: 2021-03-05

## 2021-03-02 RX ORDER — MORPHINE SULFATE 2 MG/ML
2 INJECTION, SOLUTION INTRAMUSCULAR; INTRAVENOUS EVERY 2 HOUR PRN
Status: DISCONTINUED | OUTPATIENT
Start: 2021-03-02 | End: 2021-03-02

## 2021-03-02 RX ORDER — MORPHINE SULFATE 4 MG/ML
4 INJECTION, SOLUTION INTRAMUSCULAR; INTRAVENOUS EVERY 4 HOURS PRN
Status: DISCONTINUED | OUTPATIENT
Start: 2021-03-02 | End: 2021-03-02

## 2021-03-02 RX ORDER — POTASSIUM CHLORIDE 750 MG/1
10 TABLET, EXTENDED RELEASE ORAL DAILY
Status: DISCONTINUED | OUTPATIENT
Start: 2021-03-03 | End: 2021-03-05

## 2021-03-02 RX ORDER — PANTOPRAZOLE SODIUM 20 MG/1
20 TABLET, DELAYED RELEASE ORAL
Status: DISCONTINUED | OUTPATIENT
Start: 2021-03-02 | End: 2021-03-05

## 2021-03-02 RX ORDER — ACETAMINOPHEN 325 MG/1
650 TABLET ORAL EVERY 6 HOURS PRN
Status: DISCONTINUED | OUTPATIENT
Start: 2021-03-02 | End: 2021-03-02

## 2021-03-02 RX ORDER — MELATONIN
325
Status: DISCONTINUED | OUTPATIENT
Start: 2021-03-02 | End: 2021-03-05

## 2021-03-02 RX ORDER — FUROSEMIDE 40 MG/1
40 TABLET ORAL DAILY
Status: DISCONTINUED | OUTPATIENT
Start: 2021-03-02 | End: 2021-03-05

## 2021-03-02 RX ORDER — CARVEDILOL 12.5 MG/1
12.5 TABLET ORAL 2 TIMES DAILY WITH MEALS
Status: DISCONTINUED | OUTPATIENT
Start: 2021-03-02 | End: 2021-03-04

## 2021-03-02 RX ORDER — ACETAMINOPHEN 325 MG/1
650 TABLET ORAL EVERY 6 HOURS PRN
Status: DISCONTINUED | OUTPATIENT
Start: 2021-03-02 | End: 2021-03-05

## 2021-03-02 RX ORDER — ALBUTEROL SULFATE 90 UG/1
2 AEROSOL, METERED RESPIRATORY (INHALATION) EVERY 6 HOURS PRN
Status: DISCONTINUED | OUTPATIENT
Start: 2021-03-02 | End: 2021-03-05

## 2021-03-02 RX ORDER — MORPHINE SULFATE 4 MG/ML
4 INJECTION, SOLUTION INTRAMUSCULAR; INTRAVENOUS EVERY 2 HOUR PRN
Status: DISCONTINUED | OUTPATIENT
Start: 2021-03-02 | End: 2021-03-02

## 2021-03-02 NOTE — CM/SW NOTE
03/02/21 1600   CM/SW Screening   Referral Source Social Work (self-referral)   Information Source Chart review;Nursing rounds   Patient's [address] [facility] AT [employer]   Pt noted to be from [facility]

## 2021-03-02 NOTE — H&P
[facility] [address] Patient Status:  Observation    1933 MRN [id number]   [facility] 3NW-A Attending Darren Benitez MD   Hosp Day # 0 PCP Ko Vasquez MD     History of Present Illness:  Luca Mayers stenosis 03-    dr. Jocy Abarca   • Venous insufficiency    • Ventral hernia 8/11/2014   • Viral pneumonia 3/20/2020     Past Surgical History:   Procedure Laterality Date   • APPENDECTOMY     • CHOLECYSTECTOMY     • COLONOSCOPY      3/2014   • CYSTOSCOP Comment:Myopathy  Propoxyphene And Me*    OTHER (SEE COMMENTS)    Comment:CLASS  Tramadol                HIVES    Comment:SENSATIVITY    Home Medications:    •  apixaban 2.5 MG Oral Tab, Take 1 tablet (2.5 mg total) by mouth 2 (two) times daily. , Disp: 60 (twelve) hours. , Disp: , Rfl: , Unknown at Unknown time    •  Albuterol Sulfate HFA (PROAIR HFA) 108 (90 Base) MCG/ACT Inhalation Aero Soln, Inhale 2 puffs into the lungs every 6 (six) hours as needed for Wheezing., Disp: , Rfl: , Unknown at Unknown time CREATSERUM 1.47 03/01/2021    BUN 30 03/01/2021     03/01/2021    K 4.5 03/01/2021     03/01/2021    CO2 23.0 03/01/2021     03/01/2021    CA 9.5 03/01/2021    ALB 2.8 03/01/2021    ALKPHO 82 03/01/2021    BILT 0.2 03/01/2021    TP 6.9 0 WALL:  Stable ventral postsurgical changes. URINARY BLADDER:  2.1 cm in thickness symmetric bladder wall thickening superiorly extending along the superior lateral margins which is slightly lobular in contour with trace infiltration of the adjacent fat. ureter, mild enlargement right kidney and perinephric stranding.   There is layering hyperdense material in the mid ureter for example image 132 to this hyperdense material is not of the density typical for stones and probably reflects either blood material Finalized by (CST): Kg Charlton MD on 3/02/2021 at 9:58 AM       Xr Or - N/c    Result Date: 2/2/2021            PROCEDURE:  XR OR - N/C  INDICATIONS:  Cystoscopy  COMPARISON:  None.   TECHNIQUE:   FLUOROSCOPY IMAGES OBTAINED:  10 FLUOROSCOPY TIME:  40

## 2021-03-02 NOTE — CONSULTS
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 Social History: Social History    Tobacco Use      Smoking status: Former Smoker        Packs/day: 1.00        Years: 35.00        Pack years: 35        Types: Cigarettes        Quit date: 1987        Years since quittin.1      Smokeless tobacc GFRAA 37 (L) 03/01/2021    AST 22 03/01/2021    ALT 20 03/01/2021    TP 6.9 03/01/2021    ALB 2.8 (L) 03/01/2021    PHOS 3.6 09/11/2014    CA 9.5 03/01/2021    MG 1.7 09/12/2014       Urinalysis Results (last three years):  Recent Labs     01/17/20  1508 0 10,000 - 50,000 CFU/ML Mixed gram positive giovanna 02/22/2018    URINECUL >100,000 CFU/ML Proteus mirabilis (A) 11/24/2017    URINECUL  11/15/2017     ,000 cfu/ml Multiple species present- probable contamination.      URINECUL >100,000 CFU/ML Enterobact both her daughters per her request today and went over the procedure. All questions answered. - NPO p MN  - Continue abx for UTI  - Hold anticoagulation in setting of active bleeding.  Consider holding indefinitely given high bleeding risk and multiple hos

## 2021-03-02 NOTE — ED PROVIDER NOTES
Patient Seen in: [facility] Emergency Department      History   Patient presents with:  Eval-G    Stated Complaint: vaginal bleeding     HPI/Subjective:   HPI    Patient is a 61-year-old woman who is on Eliquis recently admitted February 12 for cyst orin   • Venous insufficiency    • Ventral hernia 8/11/2014   • Viral pneumonia 3/20/2020              Past Surgical History:   Procedure Laterality Date   • APPENDECTOMY     • CHOLECYSTECTOMY     • COLONOSCOPY      3/2014   • CYSTOSCOPY BLADDER BIOPSY/ reviewed. Constitutional:       General: She is not in acute distress. Appearance: She is well-developed. She is not toxic-appearing. HENT:      Head: Normocephalic and atraumatic. Eyes:      General: No scleral icterus.      Conjunctiva/sclera: C RBC Urine >10 (*)     All other components within normal limits   CBC W/ DIFFERENTIAL - Abnormal; Notable for the following components:    RBC 3.53 (*)     .8 (*)     MCH 34.3 (*)     RDW-SD 56.8 (*)     All other components within normal limits   C

## 2021-03-02 NOTE — PLAN OF CARE
A & O x4, forgetful, [place]. Room air. Pain in back; morphine effective. Denies nausea. C/o pelvic/lower abdomen pain. CBI moderate rate; few blood clots, rutherford colored. Dr. Monica Willoughby at bedside in am; manually irrigated patient.  Pt sent to CT without complicatio Problem: DISCHARGE PLANNING  Goal: Discharge to home or other facility with appropriate resources  Description: INTERVENTIONS:  - Identify barriers to discharge w/pt and caregiver  - Include patient/family/discharge partner in discharge Jules Gresham

## 2021-03-02 NOTE — PROGRESS NOTES
Patient continues to have clots noted in tubing, though overall UOP has been more clear/light pink while on CBI    Bedside Procedure Note  Procedure Performed:  Oates irrigation, clot evacuation  Description of Procedure:  - With the patient's consent I irr

## 2021-03-02 NOTE — ANESTHESIA PREPROCEDURE EVALUATION
PRE-OP EVALUATION    Patient Name: Alex Jackson    Pre-op Diagnosis: INPT    Procedure(s):  CYSTOSCOPY, CLOT EVACUATION, POSSIBLE TRANSURETHERAL RESECTION OF BLADDER TUMOR    Surgeon(s) and Role:     Therese Diaz MD - Primary    Pre-op vitals review times daily. , Disp: 60 tablet, Rfl: 5, 3/1/2021 at Unknown time    •  carvedilol 6.25 MG Oral Tab, Take 12.5 mg by mouth 2 (two) times daily with meals. , Disp: , Rfl: , 3/1/2021 at Unknown time    •  lisinopril 10 MG Oral Tab, Take 10 mg by mouth nightly. Unknown at Unknown time    •  albuterol sulfate (2.5 MG/3ML) 0.083% Inhalation Nebu Soln, Take 2.5 mg by nebulization every 6 (six) hours as needed for Wheezing., Disp: , Rfl: , Unknown at Unknown time    •  Fluticasone Propionate 50 MCG/ACT Nasal Suspensi CHOLECYSTECTOMY     • COLONOSCOPY      3/2014   • CYSTOSCOPY BLADDER BIOPSY/ FULGURATION N/A 2/12/2021    Performed by Dilia Duvall MD at [facility] MAIN OR   • CYSTOSCOPY URETEROSCOPY N/A 2/2/2021    Performed by Dilia Duvall MD at [facility] MAIN OR   • EGD 110 03/01/2021    CO2 23.0 03/01/2021    BUN 30 (H) 03/01/2021    CREATSERUM 1.47 (H) 03/01/2021     (H) 03/01/2021    CA 9.5 03/01/2021     Lab Results   Component Value Date    INR 1.22 (H) 03/01/2021         Airway      Mallampati: III  Mouth ope

## 2021-03-02 NOTE — PLAN OF CARE
Patient A&Ox4, c/o right lower back pain, tylenol and hot packs given. CBI infusing at a moderate to fast rate, cherry urine output. IVF continued. Admission orders per MD Helen Torres. No further concerns at this time.      @6050 MD Helen Torres called d/t patient esperanza INTERVENTIONS:  - Assess pt frequently for physical needs  - Identify cognitive and physical deficits and behaviors that affect risk of falls.   - [place] fall precautions as indicated by assessment.  - Educate pt/family on patient safety including physic

## 2021-03-02 NOTE — PROGRESS NOTES
NURSING ADMISSION NOTE      Patient admitted via Cart  Oriented to room. Safety precautions initiated. Bed in low position. Call light in reach. Patient arrived A&Ox4, from [address] [place] [address]. CBI infusing, faith in place, red output.

## 2021-03-03 ENCOUNTER — ANESTHESIA (OUTPATIENT)
Dept: SURGERY | Facility: HOSPITAL | Age: 86
DRG: 669 | End: 2021-03-03
Payer: MEDICARE

## 2021-03-03 ENCOUNTER — APPOINTMENT (OUTPATIENT)
Dept: GENERAL RADIOLOGY | Facility: HOSPITAL | Age: 86
DRG: 669 | End: 2021-03-03
Attending: UROLOGY
Payer: MEDICARE

## 2021-03-03 LAB
ANION GAP SERPL CALC-SCNC: 9 MMOL/L (ref 0–18)
BASOPHILS # BLD AUTO: 0.02 X10(3) UL (ref 0–0.2)
BASOPHILS NFR BLD AUTO: 0.2 %
BUN BLD-MCNC: 34 MG/DL (ref 7–18)
BUN/CREAT SERPL: 18.9 (ref 10–20)
CALCIUM BLD-MCNC: 9.1 MG/DL (ref 8.5–10.1)
CHLORIDE SERPL-SCNC: 110 MMOL/L (ref 98–112)
CO2 SERPL-SCNC: 22 MMOL/L (ref 21–32)
CREAT BLD-MCNC: 1.8 MG/DL
DEPRECATED RDW RBC AUTO: 55.8 FL (ref 35.1–46.3)
EOSINOPHIL # BLD AUTO: 0.19 X10(3) UL (ref 0–0.7)
EOSINOPHIL NFR BLD AUTO: 1.8 %
ERYTHROCYTE [DISTWIDTH] IN BLOOD BY AUTOMATED COUNT: 14.8 % (ref 11–15)
GLUCOSE BLD-MCNC: 141 MG/DL (ref 70–99)
GLUCOSE BLD-MCNC: 90 MG/DL (ref 70–99)
HCT VFR BLD AUTO: 31.4 %
HGB BLD-MCNC: 10.5 G/DL
IMM GRANULOCYTES # BLD AUTO: 0.07 X10(3) UL (ref 0–1)
IMM GRANULOCYTES NFR BLD: 0.7 %
LYMPHOCYTES # BLD AUTO: 1.14 X10(3) UL (ref 1–4)
LYMPHOCYTES NFR BLD AUTO: 11.1 %
MCH RBC QN AUTO: 34.7 PG (ref 26–34)
MCHC RBC AUTO-ENTMCNC: 33.4 G/DL (ref 31–37)
MCV RBC AUTO: 103.6 FL
MONOCYTES # BLD AUTO: 0.8 X10(3) UL (ref 0.1–1)
MONOCYTES NFR BLD AUTO: 7.8 %
NEUTROPHILS # BLD AUTO: 8.07 X10 (3) UL (ref 1.5–7.7)
NEUTROPHILS # BLD AUTO: 8.07 X10(3) UL (ref 1.5–7.7)
NEUTROPHILS NFR BLD AUTO: 78.4 %
OSMOLALITY SERPL CALC.SUM OF ELEC: 299 MOSM/KG (ref 275–295)
PLATELET # BLD AUTO: 163 10(3)UL (ref 150–450)
POTASSIUM SERPL-SCNC: 4.4 MMOL/L (ref 3.5–5.1)
RBC # BLD AUTO: 3.03 X10(6)UL
SODIUM SERPL-SCNC: 141 MMOL/L (ref 136–145)
WBC # BLD AUTO: 10.3 X10(3) UL (ref 4–11)

## 2021-03-03 PROCEDURE — BT1DZZZ FLUOROSCOPY OF RIGHT KIDNEY, URETER AND BLADDER: ICD-10-PCS | Performed by: UROLOGY

## 2021-03-03 PROCEDURE — 52240 CYSTOSCOPY AND TREATMENT: CPT | Performed by: UROLOGY

## 2021-03-03 PROCEDURE — 0TCB8ZZ EXTIRPATION OF MATTER FROM BLADDER, VIA NATURAL OR ARTIFICIAL OPENING ENDOSCOPIC: ICD-10-PCS | Performed by: UROLOGY

## 2021-03-03 PROCEDURE — 74420 UROGRAPHY RTRGR +-KUB: CPT | Performed by: UROLOGY

## 2021-03-03 PROCEDURE — 0TBB8ZZ EXCISION OF BLADDER, VIA NATURAL OR ARTIFICIAL OPENING ENDOSCOPIC: ICD-10-PCS | Performed by: UROLOGY

## 2021-03-03 PROCEDURE — 0T5B8ZZ DESTRUCTION OF BLADDER, VIA NATURAL OR ARTIFICIAL OPENING ENDOSCOPIC: ICD-10-PCS | Performed by: UROLOGY

## 2021-03-03 RX ORDER — ONDANSETRON 2 MG/ML
4 INJECTION INTRAMUSCULAR; INTRAVENOUS EVERY 6 HOURS PRN
Status: DISCONTINUED | OUTPATIENT
Start: 2021-03-03 | End: 2021-03-05

## 2021-03-03 RX ORDER — ONDANSETRON 2 MG/ML
INJECTION INTRAMUSCULAR; INTRAVENOUS AS NEEDED
Status: DISCONTINUED | OUTPATIENT
Start: 2021-03-03 | End: 2021-03-03 | Stop reason: SURG

## 2021-03-03 RX ORDER — DEXAMETHASONE SODIUM PHOSPHATE 4 MG/ML
VIAL (ML) INJECTION AS NEEDED
Status: DISCONTINUED | OUTPATIENT
Start: 2021-03-03 | End: 2021-03-03 | Stop reason: SURG

## 2021-03-03 RX ORDER — IPRATROPIUM BROMIDE AND ALBUTEROL SULFATE 2.5; .5 MG/3ML; MG/3ML
SOLUTION RESPIRATORY (INHALATION)
Status: COMPLETED
Start: 2021-03-03 | End: 2021-03-03

## 2021-03-03 RX ORDER — NALOXONE HYDROCHLORIDE 0.4 MG/ML
80 INJECTION, SOLUTION INTRAMUSCULAR; INTRAVENOUS; SUBCUTANEOUS AS NEEDED
Status: DISCONTINUED | OUTPATIENT
Start: 2021-03-03 | End: 2021-03-03 | Stop reason: HOSPADM

## 2021-03-03 RX ORDER — DEXTROSE MONOHYDRATE 25 G/50ML
50 INJECTION, SOLUTION INTRAVENOUS
Status: DISCONTINUED | OUTPATIENT
Start: 2021-03-03 | End: 2021-03-03 | Stop reason: HOSPADM

## 2021-03-03 RX ORDER — IPRATROPIUM BROMIDE AND ALBUTEROL SULFATE 2.5; .5 MG/3ML; MG/3ML
3 SOLUTION RESPIRATORY (INHALATION) ONCE
Status: COMPLETED | OUTPATIENT
Start: 2021-03-03 | End: 2021-03-03

## 2021-03-03 RX ORDER — SODIUM CHLORIDE, SODIUM LACTATE, POTASSIUM CHLORIDE, CALCIUM CHLORIDE 600; 310; 30; 20 MG/100ML; MG/100ML; MG/100ML; MG/100ML
INJECTION, SOLUTION INTRAVENOUS CONTINUOUS
Status: DISCONTINUED | OUTPATIENT
Start: 2021-03-03 | End: 2021-03-03 | Stop reason: HOSPADM

## 2021-03-03 RX ORDER — MIDAZOLAM HYDROCHLORIDE 1 MG/ML
1 INJECTION INTRAMUSCULAR; INTRAVENOUS ONCE
Status: COMPLETED | OUTPATIENT
Start: 2021-03-03 | End: 2021-03-03

## 2021-03-03 RX ORDER — PHENYLEPHRINE HCL 10 MG/ML
VIAL (ML) INJECTION AS NEEDED
Status: DISCONTINUED | OUTPATIENT
Start: 2021-03-03 | End: 2021-03-03 | Stop reason: SURG

## 2021-03-03 RX ORDER — ONDANSETRON 2 MG/ML
4 INJECTION INTRAMUSCULAR; INTRAVENOUS AS NEEDED
Status: DISCONTINUED | OUTPATIENT
Start: 2021-03-03 | End: 2021-03-03 | Stop reason: HOSPADM

## 2021-03-03 RX ORDER — LABETALOL HYDROCHLORIDE 5 MG/ML
5 INJECTION, SOLUTION INTRAVENOUS EVERY 5 MIN PRN
Status: DISCONTINUED | OUTPATIENT
Start: 2021-03-03 | End: 2021-03-03 | Stop reason: HOSPADM

## 2021-03-03 RX ORDER — SODIUM CHLORIDE, SODIUM LACTATE, POTASSIUM CHLORIDE, CALCIUM CHLORIDE 600; 310; 30; 20 MG/100ML; MG/100ML; MG/100ML; MG/100ML
INJECTION, SOLUTION INTRAVENOUS CONTINUOUS PRN
Status: DISCONTINUED | OUTPATIENT
Start: 2021-03-03 | End: 2021-03-03 | Stop reason: SURG

## 2021-03-03 RX ORDER — MIDAZOLAM HYDROCHLORIDE 1 MG/ML
INJECTION INTRAMUSCULAR; INTRAVENOUS
Status: COMPLETED
Start: 2021-03-03 | End: 2021-03-03

## 2021-03-03 RX ORDER — LIDOCAINE HYDROCHLORIDE 10 MG/ML
INJECTION, SOLUTION EPIDURAL; INFILTRATION; INTRACAUDAL; PERINEURAL AS NEEDED
Status: DISCONTINUED | OUTPATIENT
Start: 2021-03-03 | End: 2021-03-03 | Stop reason: SURG

## 2021-03-03 RX ORDER — HYDROMORPHONE HYDROCHLORIDE 1 MG/ML
0.4 INJECTION, SOLUTION INTRAMUSCULAR; INTRAVENOUS; SUBCUTANEOUS EVERY 5 MIN PRN
Status: DISCONTINUED | OUTPATIENT
Start: 2021-03-03 | End: 2021-03-03 | Stop reason: HOSPADM

## 2021-03-03 RX ADMIN — PHENYLEPHRINE HCL 100 MCG: 10 MG/ML VIAL (ML) INJECTION at 15:44:00

## 2021-03-03 RX ADMIN — ONDANSETRON 4 MG: 2 INJECTION INTRAMUSCULAR; INTRAVENOUS at 15:44:00

## 2021-03-03 RX ADMIN — SODIUM CHLORIDE, SODIUM LACTATE, POTASSIUM CHLORIDE, CALCIUM CHLORIDE: 600; 310; 30; 20 INJECTION, SOLUTION INTRAVENOUS at 16:48:00

## 2021-03-03 RX ADMIN — SODIUM CHLORIDE, SODIUM LACTATE, POTASSIUM CHLORIDE, CALCIUM CHLORIDE: 600; 310; 30; 20 INJECTION, SOLUTION INTRAVENOUS at 17:55:00

## 2021-03-03 RX ADMIN — PHENYLEPHRINE HCL 200 MCG: 10 MG/ML VIAL (ML) INJECTION at 15:46:00

## 2021-03-03 RX ADMIN — LIDOCAINE HYDROCHLORIDE 50 MG: 10 INJECTION, SOLUTION EPIDURAL; INFILTRATION; INTRACAUDAL; PERINEURAL at 15:40:00

## 2021-03-03 RX ADMIN — PHENYLEPHRINE HCL 200 MCG: 10 MG/ML VIAL (ML) INJECTION at 15:59:00

## 2021-03-03 RX ADMIN — DEXAMETHASONE SODIUM PHOSPHATE 4 MG: 4 MG/ML VIAL (ML) INJECTION at 15:44:00

## 2021-03-03 RX ADMIN — PHENYLEPHRINE HCL 200 MCG: 10 MG/ML VIAL (ML) INJECTION at 15:54:00

## 2021-03-03 NOTE — PLAN OF CARE
A & O x 4. Room air. Pain improved, feels comfortable at this time. NPO maintained except for sips with meds. Abd folds, under al breasts, & al upper thigh/ groin cleasned; barrier cream and antifungal powder applied.  Discussed skin breakdown and managem schedule  Outcome: Progressing     Problem: DISCHARGE PLANNING  Goal: Discharge to home or other facility with appropriate resources  Description: INTERVENTIONS:  - Identify barriers to discharge w/pt and caregiver  - Include patient/family/discharge partn

## 2021-03-03 NOTE — OCCUPATIONAL THERAPY NOTE
OCCUPATIONAL THERAPY QUICK EVALUATION - INPATIENT    Room Number: 432  Evaluation Date: 3/3/2021     Type of Evaluation: Initial  Presenting Problem: Gross Hematuria    Physician Order: IP Consult to Occupational Therapy  Reason for Therapy:  ADL/IADL Dysf MAIN OR   • EGD     • HERNIA SURGERY     • HYSTERECTOMY     • LAPAROSCOPIC CHOLECYSTECTOMY      3/2014   • LAPAROSCOPIC VENTRAL HERNIA REPAIR N/A 9/10/2014    Performed by Lily Orona DO at [address]   • LUMBAR EPIDURAL N/A 9/15/2015    Performed by Elba Bui another person does the patient currently need…  -   Putting on and taking off regular lower body clothing?: A Little   -   Bathing (including washing, rinsing, drying)?: A Little  -   Toileting, which includes using toilet, bedpan or urinal? : A Little  - performance deficits    Client Assessment/Performance Deficits  LOW - No comorbidities nor modifications of tasks    Clinical Decision Making  LOW - Analysis of occupational profile, problem-focused assessments, limited treatment options    Overall Complex

## 2021-03-03 NOTE — PLAN OF CARE
Pt a&ox4, VSS, afebrile. Room air. Pt denies pain this evening. Denies nausea. CBI infusing at moderate rate, pink colored urine draining, very few blood clots noted. Pt tolerating cbi well. Pt NPO at midnight for procedure tomorrow. IVF infusing per mar. physical needs  - Identify cognitive and physical deficits and behaviors that affect risk of falls.   - [place] fall precautions as indicated by assessment.  - Educate pt/family on patient safety including physical limitations  - Instruct pt to call for a

## 2021-03-03 NOTE — PHYSICAL THERAPY NOTE
PHYSICAL THERAPY QUICK EVALUATION - INPATIENT    Room Number: 331/331-A  Evaluation Date: 3/3/2021  Presenting Problem: abdominal pain  Physician Order: PT Eval and Treat    Problem List  Principal Problem:    Gross hematuria  Active Problems:    Noa Nicolas by Shoaib Douglass DO at [place] [id number] N/A 9/15/2015    Performed by Ena Aguillon MD at [address] [facility] N/A 8/27/2015    Performed by Ena Aguillon MD at [address] [place] Moving from lying on back to sitting on the side of the bed?: A Little   How much help from another person does the patient currently need. ..   -   Moving to and from a bed to a chair (including a wheelchair)?: A Little   -   Need to walk in hospital room? Recommendations: Home    PLAN  Patient has been evaluated and presents with no skilled Physical Therapy needs at this time. Patient discharged from Physical Therapy services. Please re-order if a new functional limitation presents during this admission.

## 2021-03-03 NOTE — ANESTHESIA PROCEDURE NOTES
Airway  Urgency: elective    Airway not difficult    General Information and Staff    Patient location during procedure: OR  Anesthesiologist: Alondra Martin MD  Resident/CRNA: Amy Proctor CRNA  Performed: CRNA     Indications and Patient Condition  Christin

## 2021-03-04 ENCOUNTER — APPOINTMENT (OUTPATIENT)
Dept: CARDIOLOGY | Age: 86
End: 2021-03-04

## 2021-03-04 LAB
ALBUMIN SERPL-MCNC: 2.2 G/DL (ref 3.4–5)
ALBUMIN/GLOB SERPL: 0.6 {RATIO} (ref 1–2)
ALP LIVER SERPL-CCNC: 63 U/L
ALT SERPL-CCNC: 14 U/L
ANION GAP SERPL CALC-SCNC: 6 MMOL/L (ref 0–18)
AST SERPL-CCNC: 18 U/L (ref 15–37)
BASOPHILS # BLD AUTO: 0.01 X10(3) UL (ref 0–0.2)
BASOPHILS NFR BLD AUTO: 0.1 %
BILIRUB SERPL-MCNC: 0.1 MG/DL (ref 0.1–2)
BUN BLD-MCNC: 29 MG/DL (ref 7–18)
BUN/CREAT SERPL: 24.4 (ref 10–20)
CALCIUM BLD-MCNC: 8.5 MG/DL (ref 8.5–10.1)
CHLORIDE SERPL-SCNC: 119 MMOL/L (ref 98–112)
CO2 SERPL-SCNC: 18 MMOL/L (ref 21–32)
CREAT BLD-MCNC: 1.19 MG/DL
DEPRECATED RDW RBC AUTO: 61.1 FL (ref 35.1–46.3)
EOSINOPHIL # BLD AUTO: 0 X10(3) UL (ref 0–0.7)
EOSINOPHIL NFR BLD AUTO: 0 %
ERYTHROCYTE [DISTWIDTH] IN BLOOD BY AUTOMATED COUNT: 15.2 % (ref 11–15)
GLOBULIN PLAS-MCNC: 3.8 G/DL (ref 2.8–4.4)
GLUCOSE BLD-MCNC: 113 MG/DL (ref 70–99)
HCT VFR BLD AUTO: 33 %
HGB BLD-MCNC: 10.1 G/DL
IMM GRANULOCYTES # BLD AUTO: 0.07 X10(3) UL (ref 0–1)
IMM GRANULOCYTES NFR BLD: 0.7 %
LYMPHOCYTES # BLD AUTO: 0.62 X10(3) UL (ref 1–4)
LYMPHOCYTES NFR BLD AUTO: 6.4 %
M PROTEIN MFR SERPL ELPH: 6 G/DL (ref 6.4–8.2)
MCH RBC QN AUTO: 34.1 PG (ref 26–34)
MCHC RBC AUTO-ENTMCNC: 30.6 G/DL (ref 31–37)
MCV RBC AUTO: 111.5 FL
MONOCYTES # BLD AUTO: 0.45 X10(3) UL (ref 0.1–1)
MONOCYTES NFR BLD AUTO: 4.7 %
NEUTROPHILS # BLD AUTO: 8.52 X10 (3) UL (ref 1.5–7.7)
NEUTROPHILS # BLD AUTO: 8.52 X10(3) UL (ref 1.5–7.7)
NEUTROPHILS NFR BLD AUTO: 88.1 %
OSMOLALITY SERPL CALC.SUM OF ELEC: 303 MOSM/KG (ref 275–295)
PLATELET # BLD AUTO: 156 10(3)UL (ref 150–450)
POTASSIUM SERPL-SCNC: 4.9 MMOL/L (ref 3.5–5.1)
RBC # BLD AUTO: 2.96 X10(6)UL
SODIUM SERPL-SCNC: 143 MMOL/L (ref 136–145)
WBC # BLD AUTO: 9.7 X10(3) UL (ref 4–11)

## 2021-03-04 RX ORDER — FUROSEMIDE 10 MG/ML
20 INJECTION INTRAMUSCULAR; INTRAVENOUS ONCE
Status: COMPLETED | OUTPATIENT
Start: 2021-03-04 | End: 2021-03-04

## 2021-03-04 RX ORDER — CARVEDILOL 12.5 MG/1
12.5 TABLET ORAL 2 TIMES DAILY WITH MEALS
Status: DISCONTINUED | OUTPATIENT
Start: 2021-03-04 | End: 2021-03-05

## 2021-03-04 RX ORDER — CARVEDILOL 3.12 MG/1
6.25 TABLET ORAL 2 TIMES DAILY WITH MEALS
Status: DISCONTINUED | OUTPATIENT
Start: 2021-03-04 | End: 2021-03-04

## 2021-03-04 NOTE — OPERATIVE REPORT
Urology Operative Note    Attending Surgeon: Ignacio Rosario    Patient Name: Marcial Cueva    Date of Surgery: 3/3/2021    Preoperative Diagnosis: large bladder tumor, right hydronephrosis    Postoperative Diagnosis: large bladder tumor, bilateral ureteral fulgurated. No residual bleeding was noted at the end of the case and majority of tumor burden had been removed. This was an extensive resection and I spent over 100 minutes resecting due to tumor burden. .=    I then inserted a 22F three-way faith cathet

## 2021-03-04 NOTE — PROGRESS NOTES
[facility]    Progress Note    Henna Harris Patient Status:  Inpatient    1933 MRN [id number]   [facility] [place] 3NW-A Attending Luisito Mccoy MD   Hosp Day # 1 PCP Dominic Schwartz MD        Subjective:   Urine has been clear on very old female with a PMH of anxiety, CVA, COPD, afib (on xarelto), former smoker. Admitted several times over the past month with gross hematuria.  Underwent cysto, clot evac on 2/2/21 and cysto, partial TURBT for large bladder tumor on 2/12/21 with Dr Greg Solano

## 2021-03-04 NOTE — PROGRESS NOTES
Patient in severe pain. Morphine given. Dr. Mitchel Ramos paged regarding sepsis BPA--no need for further orders--continue to monitor patient. Telemetry and zofran ordered. Order to hold BP meds tonight. Will page MD if status changes.

## 2021-03-04 NOTE — ANESTHESIA POSTPROCEDURE EVALUATION
[address] Patient Status:  Inpatient   Age/Gender 80year old female MRN [id number]   [facility] SURGERY Attending Luisito Mccoy MD   Hosp Day # 0 PCP Dominic Schwartz MD       Anesthesia Post-op Note    Procedure(s):  C

## 2021-03-04 NOTE — PLAN OF CARE
A&Ox4. VSS. 2-3L O2. . Telemetry: NSR/ST. Lisinopril held tonight. GI: Abdomen soft, nondistended. Denies passing gas. Denies nausea. : 3 way catheter in place--CBI running at slow rate--yellow urine draining.   Pain controlled with PRN pain medica

## 2021-03-04 NOTE — PROGRESS NOTES
[facility]  Progress Note    Dominick Blue Patient Status:  Inpatient    1933 MRN [id number]   [facility] 3NW-A Attending Amarilis Hull MD   Hosp Day # 1 PCP Juan Daniel Crawford MD       SUBJECTIVE:  No complaints       OBJECTIVE: Umeclidinium Bromide (INCRUSE ELLIPTA) 62.5 MCG/INH inhaler 1 puff, 1 puff, Inhalation, Daily        Exam:  Gen: No acute distress, alert and oriented x3, no focal neurologic deficits  Pulm: Lungs clear, normal respiratory effort  CV: Heart with regular ra stable   IV ABT and follow cultures   Urology consult Appreciated   SS for discharge planning , may need rehab     Patient Active Problem List:     HTN (hypertension)     Paroxysmal atrial fibrillation (HCC)     CHF (congestive heart failure) ([address]     Rec

## 2021-03-04 NOTE — PROGRESS NOTES
[facility]  Progress Note    Nevin Adler Patient Status:  Inpatient    1933 MRN [id number]   [facility] 3NW-A Attending Lila Resendiz MD   Hosp Day # 0 PCP Vincent Naik MD     Seen earlier in the morning   SUBJECTIVE:  Back mg, Oral, QAM AC    •  Potassium Chloride ER (K-DUR) CR tab 10 mEq, 10 mEq, Oral, Daily    •  Acidophilus/Pectin (PROBIOTIC) CAPS 1 capsule, 1 capsule, Oral, Daily    •  Umeclidinium Bromide (INCRUSE ELLIPTA) 62.5 MCG/INH inhaler 1 puff, 1 puff, Inhalation HTN (hypertension)     Paroxysmal atrial fibrillation (HCC)     CHF (congestive heart failure) (HCC)     Recurrent UTI     Pre-diabetes     Parotid mass     Thrombocytopenia (HCC)     Other emphysema (HCC)     Gross hematuria     Acute cystitis with hema

## 2021-03-04 NOTE — OCCUPATIONAL THERAPY NOTE
OCCUPATIONAL THERAPY EVALUATION - INPATIENT     Room Number: 331/331-A  Evaluation Date: 3/4/2021  Type of Evaluation: Re-evaluation  Presenting Problem: Gross Hematuria    Physician Order: IP Consult to Occupational Therapy  Reason for Therapy: ADL/IADL D CHOLECYSTECTOMY     • COLONOSCOPY      3/2014   • CYSTOSCOPY BLADDER BIOPSY/ FULGURATION N/A 2/12/2021    Performed by Bereket Glez MD at [facility] MAIN OR   • CYSTOSCOPY URETEROSCOPY N/A 2/2/2021    Performed by Bereket Glez MD at [facility] MAIN OR   • EGD limits    VISION  Current Vision: no visual deficits    PERCEPTION  Overall Perception Status:   WFL - within functional limits    SENSATION  Light touch:  intact    Communication: Pt is able to communicate all basic needs and wants    Behavioral/Emotional Patient is a 80year old female admitted on 3/1/2021 for Gross Hematuria, now s/p surgical procedure. Complete medical history and occupational profile noted above. Functional outcome measures completed include AMPAC, MMT, ROM.  In this OT evaluation pa supervision    Functional Transfer Goals  Patient will transfer from supine to sit:  with supervision  Patient will transfer from sit to stand:  with supervision  Patient will transfer to toilet:  with supervision    UE Exercise Program Goal  Patient will

## 2021-03-04 NOTE — CM/SW NOTE
Case discussed in rounds. Pt with new faith, may need SNF. Pt is from [facility], will check with them. PT to see today, RAMON will follow up. RAMON met with pt and her daughter Tutu Braga at bedside to discuss dc plans.  Pt will dc with new fo

## 2021-03-04 NOTE — PHYSICAL THERAPY NOTE
PHYSICAL THERAPY EVALUATION - INPATIENT     Room Number: 331/331-A  Evaluation Date: 3/4/2021  Type of Evaluation: Re-evaluation  Physician Order: PT Eval and Treat    Presenting Problem: s/p TURBT 3/3/21  Reason for Therapy: Mobility Dysfunction and COLONOSCOPY      3/2014   • CYSTOSCOPY BLADDER BIOPSY/ FULGURATION N/A 2/12/2021    Performed by Isaias Gibbs MD at [facility] MAIN OR   • CYSTOSCOPY URETEROSCOPY N/A 2/2/2021    Performed by Isaias Gibbs MD at [facility] MAIN OR   • EGD     • HERNIA SURGERY functional limits     Lower extremity strength is within functional limits     BALANCE  Static Sitting: Good  Dynamic Sitting: Good  Static Standing: Fair -  Dynamic Standing: Poor +    ADDITIONAL TESTS                                    NEUROLOGICAL FINDI training  Strengthening  Transfer training    Patient End of Session: In bed;Needs met;Call light within reach;RN aware of session/findings; All patient questions and concerns addressed; Alarm set    ASSESSMENT   Patient is a 80year old female admitted on 3 walker - rolling at assistance level: supervision     Goal #4    Goal #5    Goal #6    Goal Comments: Goals established on 3/4/2021    PPE donned by therapist: gloves, goggles, surgical mask, gown

## 2021-03-05 VITALS
DIASTOLIC BLOOD PRESSURE: 68 MMHG | WEIGHT: 207.44 LBS | HEART RATE: 85 BPM | BODY MASS INDEX: 37 KG/M2 | SYSTOLIC BLOOD PRESSURE: 143 MMHG | RESPIRATION RATE: 17 BRPM | TEMPERATURE: 98 F | OXYGEN SATURATION: 98 %

## 2021-03-05 RX ORDER — FUROSEMIDE 10 MG/ML
20 INJECTION INTRAMUSCULAR; INTRAVENOUS ONCE
Status: COMPLETED | OUTPATIENT
Start: 2021-03-05 | End: 2021-03-05

## 2021-03-05 RX ORDER — CEFDINIR 300 MG/1
300 CAPSULE ORAL 2 TIMES DAILY
Qty: 14 CAPSULE | Refills: 0 | Status: SHIPPED | OUTPATIENT
Start: 2021-03-05 | End: 2021-03-12

## 2021-03-05 RX ORDER — DOCUSATE SODIUM 100 MG/1
100 CAPSULE, LIQUID FILLED ORAL 2 TIMES DAILY
Status: DISCONTINUED | OUTPATIENT
Start: 2021-03-05 | End: 2021-03-05

## 2021-03-05 RX ORDER — PSEUDOEPHEDRINE HCL 30 MG
100 TABLET ORAL 2 TIMES DAILY
Qty: 1 CAPSULE | Refills: 0 | Status: SHIPPED | OUTPATIENT
Start: 2021-03-05 | End: 2021-03-19

## 2021-03-05 RX ORDER — CEPHALEXIN 250 MG/1
250 CAPSULE ORAL EVERY 8 HOURS SCHEDULED
Status: DISCONTINUED | OUTPATIENT
Start: 2021-03-05 | End: 2021-03-05

## 2021-03-05 NOTE — PLAN OF CARE
Patient A&Ox4, VSS oxygen on overnight, c/o SOB during the night, patient placed in upright position deep breaths encouraged, prn albuterol inhaler administer by RT. IS given instruction provided, return demonstration done per patient.  Telemetry/ contin pt frequently for physical needs  - Identify cognitive and physical deficits and behaviors that affect risk of falls.   - [place] fall precautions as indicated by assessment.  - Educate pt/family on patient safety including physical limitations  - Instruc

## 2021-03-05 NOTE — PLAN OF CARE
Pt A&Ox4. RA. Pt denies any shortness of breath, chest pain or calf pain. CBI clamped and yellow/clear urine draining. Generalized edema noted, IV lasix given per order. Pt will be going home with faith, awaiting for placement.  Tolerating diet well, [place] Progressing     Problem: DISCHARGE PLANNING  Goal: Discharge to home or other facility with appropriate resources  Description: INTERVENTIONS:  - Identify barriers to discharge w/pt and caregiver  - Include patient/family/discharge partner in discharge gagandeep

## 2021-03-05 NOTE — CM/SW NOTE
Case discussed in rounds, anticipate dc today. Pt has been accepted to [facility] for [facility] and they have a bed available today, SW will coordinate. SW arranged [facility] at [place]. Met with pt and her daughter Carie Every to confirm dc plans.  Unit RN to

## 2021-03-05 NOTE — PHYSICAL THERAPY NOTE
PHYSICAL THERAPY TREATMENT NOTE - INPATIENT    Room Number: 331/331-A     Session: 1   Number of Visits to Meet Established Goals: 3    Presenting Problem: s/p TURBT 3/3/21        History related to current admission: Pt is a 80year old female admitted o Samy Talley MD at [facility] MAIN OR   • CYSTOSCOPY TRANSURETHRAL RESECTION BLADDER TUMOR N/A 3/3/2021    Performed by Nereida Aguilar MD at [facility] MAIN OR   • CYSTOSCOPY URETEROSCOPY N/A 2/2/2021    Performed by Samy Talley MD at [facility] MAIN OR   • EGD     • down on and standing up from a chair with arms (e.g., wheelchair, bedside commode, etc.): A Little   -   Moving from lying on back to sitting on the side of the bed?: A Little   How much help from another person does the patient currently need. ..   -   Mov risk for tripping over. She would benefit from sub acute rehab to help her regain her baseline strength and functional mobility independence before returning to independent living facility.      DISCHARGE RECOMMENDATIONS  PT Discharge Recommendations: Sub-a

## 2021-03-05 NOTE — OCCUPATIONAL THERAPY NOTE
OCCUPATIONAL THERAPY TREATMENT NOTE - INPATIENT     Room Number: 331/331-A  Session: 1  Number of Visits to Meet Established Goals: 5    Presenting Problem: Gross Hematuria    History related to current admission: Pt is a 80year old female admitted on 3/1 Lawrence Cordero MD at [facility] MAIN OR   • CYSTOSCOPY TRANSURETHRAL RESECTION BLADDER TUMOR N/A 3/3/2021    Performed by America Etienne MD at [facility] MAIN OR   • CYSTOSCOPY URETEROSCOPY N/A 2/2/2021    Performed by Lawrence Cordero MD at [facility] MAIN OR   • EGD     • Impairment: 38.32%  Standardized Score (AM-PAC Scale): 42.03  CMS Modifier (G-Code): CJ    FUNCTIONAL TRANSFER ASSESSMENT  Supine to Sit : Contact guard assist  Sit to Stand: Contact guard assist    Skilled Therapy Provided: Patient educated on OT role, go Program Goal  Patient will be supervision with bilateral AROM HEP (home exercise program).      Additional Goals:  Pt will verbalize at least 3 energy conservation techniques  Pt will stand at sink for 5 minutes to complete grooming routine

## 2021-03-05 NOTE — DISCHARGE SUMMARY
[facility]  Discharge Summary    Nury Maguire Patient Status:  Inpatient    1933 MRN [id number]   [facility] 3NW-A Attending Violeta Bee MD   [facility] Day # 2 PCP Aden Wheeler MD     Date of Admission: 3/1/2021    Date of Disc daily with meals. lisinopril 10 MG Oral Tab  Take 10 mg by mouth nightly. furosemide 20 MG Oral Tab  Take 40 mg by mouth daily. Ferrous Sulfate ER (IRON SLOW RELEASE) 140 (45 Fe) MG Oral Tab CR  Take 1 tablet by mouth daily.     dronedarone HCl ( Jacques Hobbs  3/5/2021  3:22 PM

## 2021-03-05 NOTE — PLAN OF CARE
Problem: Patient/Family Goals  Goal: Patient/Family Long Term Goal  Description: Patient's Long Term Goal:     Interventions:  -  - See additional Care Plan goals for specific interventions  Outcome: Progressing  Goal: Patient/Family Short Term Goal  Khurram

## 2021-03-06 NOTE — PROGRESS NOTES
NURSING DISCHARGE NOTE    Discharged Rehab facility via Ambulance. Accompanied by Support staff  Belongings Taken by patient/family   VSS afebrile. IV 's x 2 removed without complication.   Discharge instructions given to ambulence service to  Give to

## 2021-03-09 ENCOUNTER — OFFICE VISIT (OUTPATIENT)
Dept: SURGERY | Facility: CLINIC | Age: 86
End: 2021-03-09
Payer: MEDICARE

## 2021-03-09 VITALS — DIASTOLIC BLOOD PRESSURE: 72 MMHG | TEMPERATURE: 97 F | SYSTOLIC BLOOD PRESSURE: 135 MMHG | HEART RATE: 81 BPM

## 2021-03-09 DIAGNOSIS — C67.9 MALIGNANT NEOPLASM OF URINARY BLADDER, UNSPECIFIED SITE (HCC): Primary | ICD-10-CM

## 2021-03-09 PROCEDURE — 99214 OFFICE O/P EST MOD 30 MIN: CPT | Performed by: UROLOGY

## 2021-03-09 PROCEDURE — 1111F DSCHRG MED/CURRENT MED MERGE: CPT | Performed by: UROLOGY

## 2021-03-09 NOTE — PROGRESS NOTES
Rooming Clinician:     Nevin Adler is a 80year old female. Patient presents with:  Surgical Followup: s/p cysto bladder bx .3/3/21. Has faith.         HPI:     Comes to the office with her daughter to discuss recent hospitalization at which time mor daily with meals. • Potassium Chloride ER 10 MEQ Oral Tab CR Take 10 mEq by mouth daily. • CRANBERRY EXTRACT OR Take 500 mg by mouth 2 (two) times a day. • Probiotic Product (ALIGN) Oral Cap Take 1 capsule by mouth daily.      • loratadine (CL stenosis 03-    dr. Jocy Abarca   • Venous insufficiency    • Ventral hernia 8/11/2014   • Viral pneumonia 3/20/2020     Past Surgical History:   Procedure Laterality Date   • APPENDECTOMY     • CHOLECYSTECTOMY     • COLONOSCOPY      3/2014   • CYSTOSCOP rashes  RESPIRATORY: denies shortness of breath with exertion  CARDIOVASCULAR: denies chest pain on exertion  GI: denies abdominal pain and denies heartburn  : see HPI  NEURO: no sensory or motor complaint    EXAM:     /72   Pulse 81   Temp 97.3 °F at this time are not interested in radical surgery and are interested in palliative treatment as discussed at tumor board and therefore the patient is being referred to radiation oncology for palliative external beam radiation.     There are no diagnoses li

## 2021-03-12 ENCOUNTER — TELEPHONE (OUTPATIENT)
Dept: SURGERY | Facility: CLINIC | Age: 86
End: 2021-03-12

## 2021-03-12 NOTE — TELEPHONE ENCOUNTER
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 Recommend consultation with radiation oncology for palliative radiation as discussed in the office.   Please let me know when consultat

## 2021-03-12 NOTE — TELEPHONE ENCOUNTER
RN called daughterPonce and relayed to her that MD was updated of their decision. Per MD to follow up in 2 months. She is agreeable. She will call back at later time to set up the appt. All questions answered.

## 2021-03-12 NOTE — TELEPHONE ENCOUNTER
This RN called patient. No answer. Called Tracy, daughter. No answer. RN called Davi Hobbs, daughter and relayed MD's message and recommendations: \"Second TUR of bladder indicates same finding of urothelial carcinoma with muscle invasion with squamous different

## 2021-03-14 NOTE — DISCHARGE SUMMARY
[facility]  Discharge Summary    Jake Kaur Patient Status:  Observation    1933 MRN [id number]   [facility] 3NW-A Attending No att. providers found   Hosp Day # 0 PCP Pa La MD     Date of Admission: 2021    Date hours as needed for Wheezing., Historical    carvedilol 6.25 MG Oral Tab  Take 12.5 mg by mouth 2 (two) times daily with meals. , Historical    Fluticasone Propionate 50 MCG/ACT Nasal Suspension  2 sprays by Each Nare route daily as needed.  , Historical

## 2021-03-30 ENCOUNTER — NURSE ONLY (OUTPATIENT)
Dept: LAB | Age: 86
End: 2021-03-30
Attending: INTERNAL MEDICINE
Payer: MEDICARE

## 2021-03-30 DIAGNOSIS — I50.9 CONGESTIVE HEART FAILURE, UNSPECIFIED HF CHRONICITY, UNSPECIFIED HEART FAILURE TYPE (HCC): ICD-10-CM

## 2021-03-30 DIAGNOSIS — R73.03 PRE-DIABETES: ICD-10-CM

## 2021-03-30 PROCEDURE — 36415 COLL VENOUS BLD VENIPUNCTURE: CPT

## 2021-03-30 PROCEDURE — 83036 HEMOGLOBIN GLYCOSYLATED A1C: CPT

## 2021-03-30 PROCEDURE — 83880 ASSAY OF NATRIURETIC PEPTIDE: CPT

## 2021-03-30 PROCEDURE — 80048 BASIC METABOLIC PNL TOTAL CA: CPT

## 2021-03-31 PROBLEM — N39.0 RECURRENT UTI: Status: ACTIVE | Noted: 2017-09-28

## 2021-03-31 PROBLEM — J44.9 CHRONIC OBSTRUCTIVE PULMONARY DISEASE (HCC): Status: ACTIVE | Noted: 2021-03-31

## 2021-03-31 PROBLEM — R33.9 RETENTION OF URINE: Status: ACTIVE | Noted: 2017-11-27

## 2021-04-08 ENCOUNTER — NURSE ONLY (OUTPATIENT)
Dept: LAB | Age: 86
End: 2021-04-08
Attending: INTERNAL MEDICINE
Payer: MEDICARE

## 2021-04-08 DIAGNOSIS — D69.6 THROMBOCYTOPENIA (HCC): ICD-10-CM

## 2021-04-08 DIAGNOSIS — N17.9 AKI (ACUTE KIDNEY INJURY) (HCC): ICD-10-CM

## 2021-04-08 DIAGNOSIS — I50.9 CONGESTIVE HEART FAILURE, UNSPECIFIED HF CHRONICITY, UNSPECIFIED HEART FAILURE TYPE (HCC): ICD-10-CM

## 2021-04-08 DIAGNOSIS — N30.01 ACUTE CYSTITIS WITH HEMATURIA: ICD-10-CM

## 2021-04-08 PROCEDURE — 36415 COLL VENOUS BLD VENIPUNCTURE: CPT

## 2021-04-08 PROCEDURE — 80048 BASIC METABOLIC PNL TOTAL CA: CPT

## 2021-04-08 PROCEDURE — 85027 COMPLETE CBC AUTOMATED: CPT

## 2021-04-19 ENCOUNTER — OFFICE VISIT (OUTPATIENT)
Dept: CARDIOLOGY | Age: 86
End: 2021-04-19

## 2021-04-19 VITALS
BODY MASS INDEX: 36.86 KG/M2 | HEIGHT: 63 IN | HEART RATE: 99 BPM | DIASTOLIC BLOOD PRESSURE: 75 MMHG | WEIGHT: 208 LBS | SYSTOLIC BLOOD PRESSURE: 122 MMHG

## 2021-04-19 DIAGNOSIS — I48.0 PAROXYSMAL ATRIAL FIBRILLATION (CMD): ICD-10-CM

## 2021-04-19 DIAGNOSIS — R06.09 DYSPNEA ON EXERTION: Primary | ICD-10-CM

## 2021-04-19 DIAGNOSIS — I10 ESSENTIAL HYPERTENSION: ICD-10-CM

## 2021-04-19 DIAGNOSIS — I50.22 CHRONIC SYSTOLIC CONGESTIVE HEART FAILURE (CMD): ICD-10-CM

## 2021-04-19 PROCEDURE — 99214 OFFICE O/P EST MOD 30 MIN: CPT | Performed by: INTERNAL MEDICINE

## 2021-04-19 RX ORDER — FLUTICASONE PROPIONATE AND SALMETEROL 250; 50 UG/1; UG/1
1 POWDER RESPIRATORY (INHALATION)
COMMUNITY

## 2021-04-19 RX ORDER — FLUTICASONE PROPIONATE AND SALMETEROL 50; 250 UG/1; UG/1
POWDER RESPIRATORY (INHALATION)
COMMUNITY
Start: 2021-02-09

## 2021-04-19 RX ORDER — ALBUTEROL SULFATE 2.5 MG/3ML
2.5 SOLUTION RESPIRATORY (INHALATION)
COMMUNITY

## 2021-04-19 RX ORDER — DICYCLOMINE HCL 20 MG
20 TABLET ORAL
COMMUNITY

## 2021-04-19 RX ORDER — TORSEMIDE 20 MG/1
TABLET ORAL
COMMUNITY
Start: 2021-04-13

## 2021-05-07 RX ORDER — POTASSIUM CHLORIDE 750 MG/1
TABLET, FILM COATED, EXTENDED RELEASE ORAL
Qty: 30 TABLET | Refills: 11 | Status: ON HOLD | OUTPATIENT
Start: 2021-05-07 | End: 2021-06-15

## 2021-05-07 NOTE — TELEPHONE ENCOUNTER
Last OV 11.5.20 w/ MK (acute)   Last PE 8. 3.20   Last REFILL Potassium ER 10 meq (listed as external/pt reported)   Last LABS 11.10.20 T4Free, TSH w/reflex, CMP    No future appointments. Per PROTOCOL?  Not on protocol     Please Advise

## 2021-05-09 PROBLEM — R26.81 UNSTEADY GAIT: Status: ACTIVE | Noted: 2021-05-09

## 2021-05-25 ENCOUNTER — NURSE ONLY (OUTPATIENT)
Dept: LAB | Age: 86
End: 2021-05-25
Attending: INTERNAL MEDICINE
Payer: MEDICARE

## 2021-05-25 DIAGNOSIS — I50.9 CONGESTIVE HEART FAILURE, UNSPECIFIED HF CHRONICITY, UNSPECIFIED HEART FAILURE TYPE (HCC): ICD-10-CM

## 2021-05-25 DIAGNOSIS — I10 HYPERTENSION, UNSPECIFIED TYPE: ICD-10-CM

## 2021-05-25 DIAGNOSIS — R31.0 GROSS HEMATURIA: ICD-10-CM

## 2021-05-25 DIAGNOSIS — D69.6 THROMBOCYTOPENIA (HCC): ICD-10-CM

## 2021-05-25 PROCEDURE — 80048 BASIC METABOLIC PNL TOTAL CA: CPT

## 2021-05-25 PROCEDURE — 36415 COLL VENOUS BLD VENIPUNCTURE: CPT

## 2021-05-25 PROCEDURE — 85027 COMPLETE CBC AUTOMATED: CPT

## 2021-06-12 ENCOUNTER — APPOINTMENT (OUTPATIENT)
Dept: GENERAL RADIOLOGY | Facility: HOSPITAL | Age: 86
DRG: 481 | End: 2021-06-12
Attending: EMERGENCY MEDICINE
Payer: MEDICARE

## 2021-06-12 ENCOUNTER — ANESTHESIA (OUTPATIENT)
Dept: SURGERY | Facility: HOSPITAL | Age: 86
DRG: 481 | End: 2021-06-12
Payer: MEDICARE

## 2021-06-12 ENCOUNTER — HOSPITAL ENCOUNTER (INPATIENT)
Facility: HOSPITAL | Age: 86
LOS: 3 days | Discharge: NURSING, REHAB OR LONG-TERM CARE | DRG: 481 | End: 2021-06-15
Attending: EMERGENCY MEDICINE | Admitting: INTERNAL MEDICINE
Payer: MEDICARE

## 2021-06-12 ENCOUNTER — APPOINTMENT (OUTPATIENT)
Dept: CT IMAGING | Facility: HOSPITAL | Age: 86
DRG: 481 | End: 2021-06-12
Attending: EMERGENCY MEDICINE
Payer: MEDICARE

## 2021-06-12 ENCOUNTER — ANESTHESIA EVENT (OUTPATIENT)
Dept: SURGERY | Facility: HOSPITAL | Age: 86
DRG: 481 | End: 2021-06-12
Payer: MEDICARE

## 2021-06-12 DIAGNOSIS — S72.002A CLOSED FRACTURE OF LEFT HIP, INITIAL ENCOUNTER (HCC): Primary | ICD-10-CM

## 2021-06-12 PROCEDURE — 3E0T3BZ INTRODUCTION OF ANESTHETIC AGENT INTO PERIPHERAL NERVES AND PLEXI, PERCUTANEOUS APPROACH: ICD-10-PCS | Performed by: ANESTHESIOLOGY

## 2021-06-12 PROCEDURE — 73502 X-RAY EXAM HIP UNI 2-3 VIEWS: CPT | Performed by: EMERGENCY MEDICINE

## 2021-06-12 PROCEDURE — 70450 CT HEAD/BRAIN W/O DYE: CPT | Performed by: EMERGENCY MEDICINE

## 2021-06-12 PROCEDURE — 71045 X-RAY EXAM CHEST 1 VIEW: CPT | Performed by: EMERGENCY MEDICINE

## 2021-06-12 RX ORDER — ROPIVACAINE HYDROCHLORIDE 5 MG/ML
30 INJECTION, SOLUTION EPIDURAL; INFILTRATION; PERINEURAL AS NEEDED
Status: DISCONTINUED | OUTPATIENT
Start: 2021-06-12 | End: 2021-06-14 | Stop reason: ALTCHOICE

## 2021-06-12 RX ORDER — MORPHINE SULFATE 2 MG/ML
2 INJECTION, SOLUTION INTRAMUSCULAR; INTRAVENOUS ONCE
Status: COMPLETED | OUTPATIENT
Start: 2021-06-12 | End: 2021-06-12

## 2021-06-12 RX ORDER — MELATONIN
325
COMMUNITY

## 2021-06-12 RX ORDER — SODIUM CHLORIDE 9 MG/ML
10 INJECTION INTRAVENOUS AS NEEDED
Status: DISCONTINUED | OUTPATIENT
Start: 2021-06-12 | End: 2021-06-15

## 2021-06-12 RX ORDER — LIDOCAINE HYDROCHLORIDE 10 MG/ML
2 INJECTION, SOLUTION EPIDURAL; INFILTRATION; INTRACAUDAL; PERINEURAL AS NEEDED
Status: DISCONTINUED | OUTPATIENT
Start: 2021-06-12 | End: 2021-06-14 | Stop reason: ALTCHOICE

## 2021-06-12 RX ORDER — ONDANSETRON 2 MG/ML
4 INJECTION INTRAMUSCULAR; INTRAVENOUS ONCE
Status: COMPLETED | OUTPATIENT
Start: 2021-06-12 | End: 2021-06-12

## 2021-06-12 RX ORDER — MORPHINE SULFATE 2 MG/ML
INJECTION, SOLUTION INTRAMUSCULAR; INTRAVENOUS
Status: COMPLETED
Start: 2021-06-12 | End: 2021-06-12

## 2021-06-12 RX ORDER — DRONEDARONE 400 MG/1
400 TABLET, FILM COATED ORAL 2 TIMES DAILY WITH MEALS
COMMUNITY
End: 2021-12-20

## 2021-06-12 RX ADMIN — ROPIVACAINE HYDROCHLORIDE 30 ML: 5 INJECTION, SOLUTION EPIDURAL; INFILTRATION; PERINEURAL at 21:15:00

## 2021-06-12 RX ADMIN — ROPIVACAINE HYDROCHLORIDE 30 ML: 5 INJECTION, SOLUTION EPIDURAL; INFILTRATION; PERINEURAL at 21:40:00

## 2021-06-12 NOTE — ED INITIAL ASSESSMENT (HPI)
Pt to ED post mechanical fall. Pt sts she was trying to get out of the way of a spider. +hit head, denies LOC. C/o low back pain and L hip pain.

## 2021-06-13 ENCOUNTER — ANESTHESIA EVENT (OUTPATIENT)
Dept: MEDSURG UNIT | Facility: HOSPITAL | Age: 86
DRG: 481 | End: 2021-06-13
Payer: MEDICARE

## 2021-06-13 ENCOUNTER — ANESTHESIA EVENT (OUTPATIENT)
Dept: SURGERY | Facility: HOSPITAL | Age: 86
DRG: 481 | End: 2021-06-13
Payer: MEDICARE

## 2021-06-13 ENCOUNTER — APPOINTMENT (OUTPATIENT)
Dept: GENERAL RADIOLOGY | Facility: HOSPITAL | Age: 86
DRG: 481 | End: 2021-06-13
Attending: ORTHOPAEDIC SURGERY
Payer: MEDICARE

## 2021-06-13 ENCOUNTER — ANESTHESIA (OUTPATIENT)
Dept: SURGERY | Facility: HOSPITAL | Age: 86
DRG: 481 | End: 2021-06-13
Payer: MEDICARE

## 2021-06-13 ENCOUNTER — ANESTHESIA (OUTPATIENT)
Dept: MEDSURG UNIT | Facility: HOSPITAL | Age: 86
DRG: 481 | End: 2021-06-13
Payer: MEDICARE

## 2021-06-13 PROCEDURE — 76000 FLUOROSCOPY <1 HR PHYS/QHP: CPT | Performed by: ORTHOPAEDIC SURGERY

## 2021-06-13 PROCEDURE — 99223 1ST HOSP IP/OBS HIGH 75: CPT | Performed by: ORTHOPAEDIC SURGERY

## 2021-06-13 PROCEDURE — 99222 1ST HOSP IP/OBS MODERATE 55: CPT | Performed by: INTERNAL MEDICINE

## 2021-06-13 PROCEDURE — 3E0T3BZ INTRODUCTION OF ANESTHETIC AGENT INTO PERIPHERAL NERVES AND PLEXI, PERCUTANEOUS APPROACH: ICD-10-PCS | Performed by: ANESTHESIOLOGY

## 2021-06-13 PROCEDURE — 0QS706Z REPOSITION LEFT UPPER FEMUR WITH INTRAMEDULLARY INTERNAL FIXATION DEVICE, OPEN APPROACH: ICD-10-PCS | Performed by: ORTHOPAEDIC SURGERY

## 2021-06-13 PROCEDURE — 99291 CRITICAL CARE FIRST HOUR: CPT | Performed by: HOSPITALIST

## 2021-06-13 DEVICE — INTERTAN LAG/COMPRESSION SCREW KIT                                    85MM / 80MM
Type: IMPLANTABLE DEVICE | Site: LEG | Status: FUNCTIONAL
Brand: TRIGEN

## 2021-06-13 DEVICE — TRIGEN LOW PROFILE SCREW 5.0MM X 30MM
Type: IMPLANTABLE DEVICE | Site: LEG | Status: FUNCTIONAL
Brand: TRIGEN

## 2021-06-13 DEVICE — TRIGEN INTERTAN 11.5MM X 18CM 125DEGREE
Type: IMPLANTABLE DEVICE | Site: LEG | Status: FUNCTIONAL
Brand: TRIGEN

## 2021-06-13 RX ORDER — ONDANSETRON 2 MG/ML
4 INJECTION INTRAMUSCULAR; INTRAVENOUS EVERY 4 HOURS PRN
Status: ACTIVE | OUTPATIENT
Start: 2021-06-13 | End: 2021-06-15

## 2021-06-13 RX ORDER — ASPIRIN 81 MG/1
81 TABLET ORAL 2 TIMES DAILY
Status: DISCONTINUED | OUTPATIENT
Start: 2021-06-13 | End: 2021-06-15

## 2021-06-13 RX ORDER — HYDROMORPHONE HYDROCHLORIDE 1 MG/ML
0.4 INJECTION, SOLUTION INTRAMUSCULAR; INTRAVENOUS; SUBCUTANEOUS EVERY 5 MIN PRN
Status: DISCONTINUED | OUTPATIENT
Start: 2021-06-13 | End: 2021-06-13 | Stop reason: HOSPADM

## 2021-06-13 RX ORDER — SODIUM PHOSPHATE, DIBASIC AND SODIUM PHOSPHATE, MONOBASIC 7; 19 G/133ML; G/133ML
1 ENEMA RECTAL ONCE AS NEEDED
Status: DISCONTINUED | OUTPATIENT
Start: 2021-06-13 | End: 2021-06-15

## 2021-06-13 RX ORDER — SODIUM CHLORIDE, SODIUM LACTATE, POTASSIUM CHLORIDE, CALCIUM CHLORIDE 600; 310; 30; 20 MG/100ML; MG/100ML; MG/100ML; MG/100ML
INJECTION, SOLUTION INTRAVENOUS CONTINUOUS
Status: DISCONTINUED | OUTPATIENT
Start: 2021-06-13 | End: 2021-06-13

## 2021-06-13 RX ORDER — POLYETHYLENE GLYCOL 3350 17 G/17G
17 POWDER, FOR SOLUTION ORAL DAILY PRN
Status: DISCONTINUED | OUTPATIENT
Start: 2021-06-13 | End: 2021-06-15

## 2021-06-13 RX ORDER — DOCUSATE SODIUM 100 MG/1
100 CAPSULE, LIQUID FILLED ORAL 2 TIMES DAILY
Status: DISCONTINUED | OUTPATIENT
Start: 2021-06-13 | End: 2021-06-15

## 2021-06-13 RX ORDER — MORPHINE SULFATE 2 MG/ML
2 INJECTION, SOLUTION INTRAMUSCULAR; INTRAVENOUS EVERY 2 HOUR PRN
Status: DISCONTINUED | OUTPATIENT
Start: 2021-06-13 | End: 2021-06-14

## 2021-06-13 RX ORDER — CEFAZOLIN SODIUM/WATER 2 G/20 ML
2 SYRINGE (ML) INTRAVENOUS EVERY 8 HOURS
Status: DISCONTINUED | OUTPATIENT
Start: 2021-06-13 | End: 2021-06-13

## 2021-06-13 RX ORDER — SENNOSIDES 8.6 MG
17.2 TABLET ORAL NIGHTLY
Status: DISCONTINUED | OUTPATIENT
Start: 2021-06-13 | End: 2021-06-15

## 2021-06-13 RX ORDER — MORPHINE SULFATE 4 MG/ML
4 INJECTION, SOLUTION INTRAMUSCULAR; INTRAVENOUS EVERY 2 HOUR PRN
Status: DISCONTINUED | OUTPATIENT
Start: 2021-06-13 | End: 2021-06-14

## 2021-06-13 RX ORDER — HYDROCODONE BITARTRATE AND ACETAMINOPHEN 5; 325 MG/1; MG/1
1 TABLET ORAL EVERY 4 HOURS PRN
Status: DISCONTINUED | OUTPATIENT
Start: 2021-06-13 | End: 2021-06-15

## 2021-06-13 RX ORDER — LISINOPRIL 10 MG/1
10 TABLET ORAL NIGHTLY
Status: DISCONTINUED | OUTPATIENT
Start: 2021-06-13 | End: 2021-06-14

## 2021-06-13 RX ORDER — PANTOPRAZOLE SODIUM 20 MG/1
20 TABLET, DELAYED RELEASE ORAL
Status: DISCONTINUED | OUTPATIENT
Start: 2021-06-13 | End: 2021-06-15

## 2021-06-13 RX ORDER — ONDANSETRON 2 MG/ML
INJECTION INTRAMUSCULAR; INTRAVENOUS
Status: DISPENSED
Start: 2021-06-13 | End: 2021-06-13

## 2021-06-13 RX ORDER — MORPHINE SULFATE 2 MG/ML
1 INJECTION, SOLUTION INTRAMUSCULAR; INTRAVENOUS EVERY 2 HOUR PRN
Status: DISCONTINUED | OUTPATIENT
Start: 2021-06-13 | End: 2021-06-14

## 2021-06-13 RX ORDER — SODIUM CHLORIDE 9 MG/ML
INJECTION, SOLUTION INTRAVENOUS CONTINUOUS
Status: DISCONTINUED | OUTPATIENT
Start: 2021-06-13 | End: 2021-06-13 | Stop reason: HOSPADM

## 2021-06-13 RX ORDER — ACETAMINOPHEN 325 MG/1
650 TABLET ORAL EVERY 4 HOURS PRN
Status: DISCONTINUED | OUTPATIENT
Start: 2021-06-13 | End: 2021-06-15

## 2021-06-13 RX ORDER — FLUTICASONE PROPIONATE 50 MCG
2 SPRAY, SUSPENSION (ML) NASAL DAILY
Status: DISCONTINUED | OUTPATIENT
Start: 2021-06-13 | End: 2021-06-14

## 2021-06-13 RX ORDER — ONDANSETRON 2 MG/ML
INJECTION INTRAMUSCULAR; INTRAVENOUS AS NEEDED
Status: DISCONTINUED | OUTPATIENT
Start: 2021-06-13 | End: 2021-06-13 | Stop reason: SURG

## 2021-06-13 RX ORDER — SODIUM CHLORIDE, SODIUM LACTATE, POTASSIUM CHLORIDE, CALCIUM CHLORIDE 600; 310; 30; 20 MG/100ML; MG/100ML; MG/100ML; MG/100ML
INJECTION, SOLUTION INTRAVENOUS CONTINUOUS PRN
Status: DISCONTINUED | OUTPATIENT
Start: 2021-06-13 | End: 2021-06-13 | Stop reason: SURG

## 2021-06-13 RX ORDER — DOXEPIN HYDROCHLORIDE 50 MG/1
1 CAPSULE ORAL DAILY
Status: DISCONTINUED | OUTPATIENT
Start: 2021-06-14 | End: 2021-06-15

## 2021-06-13 RX ORDER — BISACODYL 10 MG
10 SUPPOSITORY, RECTAL RECTAL
Status: DISCONTINUED | OUTPATIENT
Start: 2021-06-13 | End: 2021-06-15

## 2021-06-13 RX ORDER — ALBUTEROL SULFATE 2.5 MG/3ML
2.5 SOLUTION RESPIRATORY (INHALATION) EVERY 6 HOURS PRN
Status: DISCONTINUED | OUTPATIENT
Start: 2021-06-13 | End: 2021-06-15

## 2021-06-13 RX ORDER — BISACODYL 10 MG
10 SUPPOSITORY, RECTAL RECTAL DAILY PRN
Status: DISCONTINUED | OUTPATIENT
Start: 2021-06-13 | End: 2021-06-13

## 2021-06-13 RX ORDER — PHENYLEPHRINE HCL 10 MG/ML
VIAL (ML) INJECTION AS NEEDED
Status: DISCONTINUED | OUTPATIENT
Start: 2021-06-13 | End: 2021-06-13 | Stop reason: SURG

## 2021-06-13 RX ORDER — METOCLOPRAMIDE HYDROCHLORIDE 5 MG/ML
INJECTION INTRAMUSCULAR; INTRAVENOUS AS NEEDED
Status: DISCONTINUED | OUTPATIENT
Start: 2021-06-13 | End: 2021-06-13 | Stop reason: SURG

## 2021-06-13 RX ORDER — CARVEDILOL 12.5 MG/1
12.5 TABLET ORAL 2 TIMES DAILY WITH MEALS
Status: DISCONTINUED | OUTPATIENT
Start: 2021-06-13 | End: 2021-06-14

## 2021-06-13 RX ORDER — DILTIAZEM HYDROCHLORIDE 5 MG/ML
5 INJECTION INTRAVENOUS EVERY 2 HOUR PRN
Status: DISCONTINUED | OUTPATIENT
Start: 2021-06-13 | End: 2021-06-15

## 2021-06-13 RX ORDER — CARVEDILOL 12.5 MG/1
12.5 TABLET ORAL ONCE
Status: COMPLETED | OUTPATIENT
Start: 2021-06-13 | End: 2021-06-13

## 2021-06-13 RX ORDER — DEXTROSE MONOHYDRATE 25 G/50ML
50 INJECTION, SOLUTION INTRAVENOUS
Status: DISCONTINUED | OUTPATIENT
Start: 2021-06-13 | End: 2021-06-13 | Stop reason: HOSPADM

## 2021-06-13 RX ORDER — NALOXONE HYDROCHLORIDE 0.4 MG/ML
80 INJECTION, SOLUTION INTRAMUSCULAR; INTRAVENOUS; SUBCUTANEOUS AS NEEDED
Status: DISCONTINUED | OUTPATIENT
Start: 2021-06-13 | End: 2021-06-13 | Stop reason: HOSPADM

## 2021-06-13 RX ADMIN — ONDANSETRON 4 MG: 2 INJECTION INTRAMUSCULAR; INTRAVENOUS at 11:54:00

## 2021-06-13 RX ADMIN — PHENYLEPHRINE HCL 100 MCG: 10 MG/ML VIAL (ML) INJECTION at 11:02:00

## 2021-06-13 RX ADMIN — PHENYLEPHRINE HCL 100 MCG: 10 MG/ML VIAL (ML) INJECTION at 11:38:00

## 2021-06-13 RX ADMIN — PHENYLEPHRINE HCL 50 MCG: 10 MG/ML VIAL (ML) INJECTION at 11:53:00

## 2021-06-13 RX ADMIN — METOCLOPRAMIDE HYDROCHLORIDE 10 MG: 5 INJECTION INTRAMUSCULAR; INTRAVENOUS at 11:54:00

## 2021-06-13 RX ADMIN — PHENYLEPHRINE HCL 50 MCG: 10 MG/ML VIAL (ML) INJECTION at 10:27:00

## 2021-06-13 RX ADMIN — PHENYLEPHRINE HCL 100 MCG: 10 MG/ML VIAL (ML) INJECTION at 10:39:00

## 2021-06-13 RX ADMIN — SODIUM CHLORIDE, SODIUM LACTATE, POTASSIUM CHLORIDE, CALCIUM CHLORIDE: 600; 310; 30; 20 INJECTION, SOLUTION INTRAVENOUS at 10:10:00

## 2021-06-13 RX ADMIN — PHENYLEPHRINE HCL 100 MCG: 10 MG/ML VIAL (ML) INJECTION at 11:24:00

## 2021-06-13 RX ADMIN — PHENYLEPHRINE HCL 100 MCG: 10 MG/ML VIAL (ML) INJECTION at 10:25:00

## 2021-06-13 RX ADMIN — PHENYLEPHRINE HCL 100 MCG: 10 MG/ML VIAL (ML) INJECTION at 10:57:00

## 2021-06-13 NOTE — ED QUICK NOTES
Pt requested writer call her daughter Narda Po [phone] = contacted and will call back later for an update on time of surgery.

## 2021-06-13 NOTE — PHYSICAL THERAPY NOTE
Order rec'd and chart reviewed. Pt is going into surgery for hip fracture soon.  Will await post op orders for inpt PT.

## 2021-06-13 NOTE — CONSULTS
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 Viki Thao   • Venous ins LAPAROSCOPIC INCISIONAL / UMBILICAL / VENTRAL HERNIA REPAIR  9/10/2014    Procedure: LAPAROSCOPIC VENTRAL HERNIA REPAIR;  Surgeon: Jessy Latham DO;  Location: [facility] MAIN OR   • PATIENT DOCUMENTED NOT TO HAVE EXPERIENCED ANY OF THE FOLLOWING EVENTS N/A 8/25 [facility]   • PATIENT S [facility] PREOPERATIVE ORDER FOR IV ANTIBIOTIC SURGICAL SITE INFECTION PROPHYLAXIS.  N/A 9/15/2015    Procedure: LUMBAR EPIDURAL;  Surgeon: Xiomara Ocasio MD;  Location: [facility] FOR PAIN MANAGEMENT   • TOTAL ABD appearance. HENT:      Head: Normocephalic and atraumatic. Eyes:      Extraocular Movements: Extraocular movements intact. Neck:      Musculoskeletal: Normal range of motion and neck supple. Cardiovascular:      Pulses: Normal pulses.    Pulmonary: COMPARISON:  External Exams, CT, BRAIN W/O CONTRAST, 1/08/2013, 4:09 PM.  INDICATIONS:  Fall  TECHNIQUE:  Noncontrast CT scanning is performed through the brain. Dose reduction techniques were used.  Dose information is transmitted to the ACR ([employer] Dictated by (CST): Marta Bonner MD on 6/12/2021 at 7:55 PM     Finalized by (CST): Marta Bonner MD on 6/12/2021 at 7:56 PM         IMPRESSION: Iman Menjivar is a 80year old female with left closed inter trochanteric femur fracture after a mechanical [address], [place], [address] [facility] Vianey Goldsmith [email or web address]. [email or web address]  t: [phone]  o: [phone]  f: [phone]        This note was dictated using Dragon software.   While it was briefly proofread prior to completion, some grammatical, spelling, and

## 2021-06-13 NOTE — PLAN OF CARE
Patient with mild wheezing bilaterally, resp called for nebulizer treatment, she is receiving now. Patient also instructed on IS, was doing up to 1250.

## 2021-06-13 NOTE — ADDENDUM NOTE
Addendum  created 06/12/21 6275 by Shannan Aguilar MD    Actions taken from a BestPractice Advisory, Clinical Note Signed

## 2021-06-13 NOTE — BRIEF OP NOTE
Pre-Operative Diagnosis: Femur fracture, left ([facility]) [S72.92XA]     Post-Operative Diagnosis: Femur fracture, left ([address] [S72.92XA]      Procedure Performed:    FEMUR IM NAIL - LEFT    Surgeon(s) and Role:     * Kacie Smith MD - Primary    Assistant

## 2021-06-13 NOTE — ED PROVIDER NOTES
Patient Seen in: [facility] Emergency Department      History   Patient presents with:  Fall    Stated Complaint: Fall     HPI/Subjective:   HPI    This is 54-year-old woman history of hypertension, atrial fibrillation, not anticoagulated, heart simin • FLUOR GID & Mar  NDL/CATH SPI DX/THER NJX N/A 8/25/2014    Procedure: LUMBAR EPIDURAL;  Surgeon: Allyson Eli MD;  Location: [address] [place] [address]   • FLUOR GID & Mar  NDL/CATH SPI DX/THER Lyle Federico Yeison 84 N/A 9/8/2014    Procedure: LUMBAR EPIDURAL; EPIDURAL;  Surgeon: Dominick Nails MD;  Location: [address] [place] [address]   • PATIENT DOCUMENTED NOT TO HAVE EXPERIENCED ANY OF THE FOLLOWING EVENTS N/A 8/12/2015    Procedure: LUMBAR EPIDURAL;  Surgeon: Dominick Nails MD;  Location: [address] [place] [address] Types: Cigarettes        Quit date: 1987        Years since quittin.4      Smokeless tobacco: Never Used      Tobacco comment: 5 packs per week for 35 years. Quit     Vaping Use      Vaping Use: Never used    Alcohol use:  Yes      Alcohol/wee 27 (*)     Creatinine 1.35 (*)     Calculated Osmolality 297 (*)     GFR, Non- 35 (*)     GFR, -American 41 (*)     Albumin 3.0 (*)     A/G Ratio 0.8 (*)     All other components within normal limits   CBC W/ DIFFERENTIAL - Abnormal; VIEWS, LEFT (CPT=73502)  TECHNIQUE:  Unilateral 2 to 3 views of the hip and pelvis if performed. COMPARISON:  None. INDICATIONS:  Fall  PATIENT STATED HISTORY: (As transcribed by Technologist)  Left hip pain post fall today.                CONCLUSION:  Co

## 2021-06-13 NOTE — CONSULTS
[facility]  Cardiology Consultation    Tu Glaser Patient Status:  Inpatient    1933 MRN [id number]   [facility] 3SW-A Attending Jovita Wilde MD   Hosp Day # 1 PCP Fredrick Alvarez MD     Reason for Consultation:  Pa 8/11/2014   • Viral pneumonia 3/20/2020     Past Surgical History:   Procedure Laterality Date   • APPENDECTOMY     • CHOLECYSTECTOMY     • COLONOSCOPY      3/2014   • EGD     • FLUOR GID & Krupa Gaston NDL/CATH SPI DX/THER NJX N/A 8/25/2014    Procedure: LUMBAR Andrew Roger MD;  Location: [address] [place] [address]   • PATIENT DOCUMENTED NOT TO HAVE EXPERIENCED ANY OF THE FOLLOWING EVENTS N/A 9/8/2014    Procedure: LUMBAR EPIDURAL;  Surgeon: Andrew Roger MD;  Location: [address] [place] [address]  PAIN MANAGEMENT   • 3/31/14    DR. WORKMAN     Family History   Problem Relation Age of Onset   • Cancer Father         Prostate   • Prostate Cancer Father    • Ovarian Cancer Mother    • Heart Attack Brother    • Hypertension Brother    • Other (CAD) Brother    • Cancer Daught Nightly  •  docusate sodium (COLACE) cap 100 mg, 100 mg, Oral, BID  •  PEG 3350 (MIRALAX) powder packet 17 g, 17 g, Oral, Daily PRN  •  magnesium hydroxide (MILK OF MAGNESIA) 400 MG/5ML suspension 30 mL, 30 mL, Oral, Daily PRN  •  bisacodyl (DULCOLAX) rect without wheezes, rales, rhonchi or dullness. Normal excursions and effort. Abdomen: Soft, non-tender. Extremities: Without clubbing, cyanosis or edema. Neurologic: Alert and oriented, normal affect. Skin: Warm and dry.      Laboratory Data:  Lab Resul service in d/w Dr. Sanjiv Baires  -continue aspirin 81 mg daily, coreg and multaq; IV CCB prn available for elevated HR  -appears volume compensated and will follow    Thank you for allowing me to participate in the care of your patient.     Phillip Cowan MD  6/13/2021

## 2021-06-13 NOTE — ADDENDUM NOTE
Addendum  created 06/13/21 0956 by Sean Rapp, DO    Clinical Note Signed, Review and Sign - Ready for Procedure

## 2021-06-13 NOTE — ADDENDUM NOTE
Addendum  created 06/13/21 0634 by Kimber Bolaños MD    Clinical Note Signed, Review and Sign - Undo

## 2021-06-13 NOTE — ANESTHESIA PROCEDURE NOTES
[facility]  Performed by: Maria Elena Aguilar MD  Authorized by: Maria Elena Aguilar MD       General Information and Staff    Start Time:  6/12/2021 9:30 PM  End Time:  6/12/2021 9:45 PM  Anesthesiologist:  Kody Santos MD  Performed by:   Anesthesiologist

## 2021-06-13 NOTE — H&P
[facility] [address] Patient Status:  Inpatient    1933 MRN [id number]   [facility] 3SW-A Attending Maulik Alcocer MD   [facility] Day # 1 PCP Imani Cunha MD     History of Present Illness:  Gabriel Munoz GID & Violet Common NDL/CATH SPI DX/THER NJX N/A 8/25/2014    Procedure: LUMBAR EPIDURAL;  Surgeon: Aurelio Mcclain MD;  Location: [address] [place] [address] GID & Violet Common NDL[facility]CATH SPI [facility] N/A 9/8/2014    Procedure: LUMBAR EPIDURAL;  Surgeon: Surgeon: Ashley Valencia MD;  Location: [address] [place] [address]   • PATIENT DOCUMENTED NOT TO HAVE EXPERIENCED ANY OF THE FOLLOWING EVENTS N/A 8/12/2015    Procedure: LUMBAR EPIDURAL;  Surgeon: Ashley Valencia MD;  Location: [address] [place] [address] Brother    • Hypertension Brother    • Other (CAD) Brother    • Cancer Daughter 44        BREAST CA      reports that she quit smoking about 34 years ago. Her smoking use included cigarettes. She has a 35.00 pack-year smoking history.  She has never used sm time  torsemide 20 MG Oral Tab, Take 40 mg by mouth daily. , Disp: , Rfl: , 6/11/2021 at Unknown time  aspirin 81 MG Oral Tab EC, Take 81 mg by mouth daily. , Disp: , Rfl: , 6/11/2021 at Unknown time  fluticasone-salmeterol 250-50 MCG/DOSE Inhalation Aerosol resp. rate 20, height 5' 3\" (1.6 m), weight 205 lb (93 kg), SpO2 96 %. General: No acute distress. Alert and oriented x 3. HEENT: Moist mucous membranes. EOM-I. PERRL  Neck: No lymphadenopathy. No JVD. No carotid bruits. Respiratory:  Air entry diminis COMPARISON:  External Exams, CT, BRAIN W/O CONTRAST, 1/08/2013, 4:09 PM.  INDICATIONS:  Fall  TECHNIQUE:  Noncontrast CT scanning is performed through the brain. Dose reduction techniques were used.  Dose information is transmitted to the ACR ([employer] Dictated by (CST): Tessa More MD on 6/12/2021 at 7:55 PM     Finalized by (CST): Tessa More MD on 6/12/2021 at 7:56 PM          Assessment & Plan:  S/P fall   Comminuted intratrochanteric fracture involving the left hip-  For surgery today   She is

## 2021-06-13 NOTE — PROGRESS NOTES
NURSING ADMISSION NOTE      Patient admitted via stretcher  Oriented to room. Safety precautions initiated. Bed in low position. Call light in reach. Pt alert and oriented x4.  Notified  of pt hospitalization per MD will put orders in. 2

## 2021-06-13 NOTE — ANESTHESIA POSTPROCEDURE EVALUATION
[address] Patient Status:  Inpatient   Age/Gender 80year old female MRN [id number]   [facility] SURGERY Attending Luciana Ferguson MD   Hosp Day # 1 PCP Vimal Rich MD       Anesthesia Post-op Note    FEMUR IM NAIL -

## 2021-06-13 NOTE — ANESTHESIA PREPROCEDURE EVALUATION
PRE-OP EVALUATION    Patient Name: Henna Harris    Admit Diagnosis: No admission diagnoses are documented for this encounter.     Pre-op Diagnosis: Intertrochanteric fracture of left femur ([facility] [S72.655A]    FEMUR IM NAIL - LEFT    Anesthesia Procedure: Reaction Kit) 1 mg, 1 mg, Injection, PRN  [MAR Hold] Sodium Chloride (PF) 0.9 % solution 10 mL, 10 mL, Injection, PRN        Outpatient Medications:   (Not in a hospital admission)      Allergies: Aspirin, Darvocet [Propoxyphene N-Apap], Neosporin [Neomyci SUBSTANCE, EPIDURAL/SUBARACHNOID; LUMBAR/SACRAL N/A 8/25/2014    Procedure: LUMBAR EPIDURAL;  Surgeon: Deanna Hernández MD;  Location: [facility] FOR PAIN MANAGEMENT   • INJECTION, W/WO CONTRAST, DX/THERAPEUTIC SUBSTANCE, EPIDURAL/SUBARACHNOID; LUMBAR/SACRA MD;  Location: [address] [place] [address]  PAIN MANAGEMENT   • PATIENT DOCUMENTED NOT TO HAVE EXPERIENCED ANY OF THE FOLLOWING EVENTS N/A 9/15/2015    Procedure: LUMBAR EPIDURAL;  Surgeon: Kelly Jacques MD;  Location: [address] [place] [address]   • PATIENT WITHOU 06/13/2021    HCT 33.4 (L) 06/13/2021    .4 (H) 06/13/2021    MCH 32.8 06/13/2021    MCHC 31.7 06/13/2021    RDW 14.5 06/13/2021    .0 06/13/2021     Lab Results   Component Value Date     06/13/2021    K 4.4 06/13/2021     06/13/

## 2021-06-13 NOTE — ANESTHESIA PREPROCEDURE EVALUATION
PRE-OP EVALUATION    Patient Name: Henna Harris    Admit Diagnosis: No admission diagnoses are documented for this encounter.     Pre-op Diagnosis: Intertrochanteric fracture of left femur ([facility] [S72.142A]    INTRAMEDULLARY NAIL LEFT FEMUR    Anesthesia normal. Wall thickness was normal. The estimated ejection fraction was 60-65%. There was no diagnostic evidence for regional wall motion abnormalities.  Doppler parameters are consistent with abnormal left ventricular relaxation - grade 1 diastolic dysfunct Surgeon: Kika Gilliland MD;  Location: [address] [place] [address] MANAGEMENT   • LAPAROSCOPIC CHOLECYSTECTOMY      3/2014   • LAPAROSCOPIC Maddison Bernstein / Maria Esther Becker / [place]  9/10/2014    Procedure: LAPAROSCOPIC VENTRAL HERNIA REPAIR;  Surgeon: Marika Mccurdy SURGICAL SITE INFECTION PROPHYLAXIS. N/A 8/27/2015    Procedure: LUMBAR EPIDURAL;  Surgeon: Xiomara Ocasio MD;  Location: [facility] FOR PAIN MANAGEMENT   • PATIENT [address] FOR IV ANTIBIOTIC SURGICAL SITE INFECTION PROPHYLAXIS.  N/A 9/1 attack.)                Present on Admission:  **None**

## 2021-06-13 NOTE — ANESTHESIA PROCEDURE NOTES
Airway  Urgency: elective      General Information and Staff    Patient location during procedure: OR  Anesthesiologist: Joselin Sutton DO  Performed: anesthesiologist     Indications and Patient Condition  Indications for airway management: anesthesia  Sedat

## 2021-06-13 NOTE — ANESTHESIA PROCEDURE NOTES
[facility]  Performed by: Kehinde Aguilar MD  Authorized by: Kehinde Aguilar MD       General Information and Staff    Start Time:  6/12/2021 9:10 PM  End Time:  6/12/2021 9:20 PM  Anesthesiologist:  Evangelina Huggins MD  Performed by:   Anesthesiologist

## 2021-06-13 NOTE — PLAN OF CARE
Dressings to left leg dry and intact. Patient sleeping post-op with mouth open and breathing through her mouth, we are using face mask for O2 while she is sleeping and NC while she is awake. Daughter is at bedside. Patient started on Rocephin for UTI.   Tea Olsen

## 2021-06-14 ENCOUNTER — APPOINTMENT (OUTPATIENT)
Dept: GENERAL RADIOLOGY | Facility: HOSPITAL | Age: 86
DRG: 481 | End: 2021-06-14
Attending: INTERNAL MEDICINE
Payer: MEDICARE

## 2021-06-14 PROCEDURE — 71045 X-RAY EXAM CHEST 1 VIEW: CPT | Performed by: INTERNAL MEDICINE

## 2021-06-14 PROCEDURE — 99291 CRITICAL CARE FIRST HOUR: CPT | Performed by: NURSE PRACTITIONER

## 2021-06-14 PROCEDURE — 99232 SBSQ HOSP IP/OBS MODERATE 35: CPT | Performed by: INTERNAL MEDICINE

## 2021-06-14 RX ORDER — SODIUM CHLORIDE 9 MG/ML
INJECTION, SOLUTION INTRAVENOUS CONTINUOUS
Status: DISCONTINUED | OUTPATIENT
Start: 2021-06-14 | End: 2021-06-14

## 2021-06-14 RX ORDER — CARVEDILOL 6.25 MG/1
6.25 TABLET ORAL 2 TIMES DAILY WITH MEALS
Status: DISCONTINUED | OUTPATIENT
Start: 2021-06-14 | End: 2021-06-15

## 2021-06-14 RX ORDER — CARVEDILOL 3.12 MG/1
3.12 TABLET ORAL 2 TIMES DAILY WITH MEALS
Status: DISCONTINUED | OUTPATIENT
Start: 2021-06-14 | End: 2021-06-14

## 2021-06-14 RX ORDER — HYDROMORPHONE HYDROCHLORIDE 1 MG/ML
0.2 INJECTION, SOLUTION INTRAMUSCULAR; INTRAVENOUS; SUBCUTANEOUS EVERY 4 HOURS PRN
Status: DISCONTINUED | OUTPATIENT
Start: 2021-06-14 | End: 2021-06-15

## 2021-06-14 RX ORDER — METOPROLOL TARTRATE 5 MG/5ML
5 INJECTION INTRAVENOUS EVERY 6 HOURS PRN
Status: DISCONTINUED | OUTPATIENT
Start: 2021-06-14 | End: 2021-06-15

## 2021-06-14 RX ORDER — FLUTICASONE PROPIONATE 50 MCG
2 SPRAY, SUSPENSION (ML) NASAL
Status: DISCONTINUED | OUTPATIENT
Start: 2021-06-14 | End: 2021-06-15

## 2021-06-14 NOTE — PHYSICAL THERAPY NOTE
PHYSICAL THERAPY EVALUATION - INPATIENT     Room Number: 461/461-A  Evaluation Date: 6/14/2021  Type of Evaluation: Initial  Physician Order: PT Eval and Treat    Presenting Problem: Communited fracture L hip with avulsion on the greater and lesser t APPENDECTOMY     • CHOLECYSTECTOMY     • COLONOSCOPY      3/2014   • EGD     • FLUOR GID & Pardeep Corey NDL/CATH SPI DX/THER NJX N/A 8/25/2014    Procedure: LUMBAR EPIDURAL;  Surgeon: Ngozi Sargent MD;  Location: [address] [place] [address]   • FLUOR GID & EXPERIENCED ANY OF THE FOLLOWING EVENTS N/A 9/8/2014    Procedure: LUMBAR EPIDURAL;  Surgeon: Gianna العراقي MD;  Location: [facility] FOR PAIN MANAGEMENT   • PATIENT DOCUMENTED NOT TO HAVE EXPERIENCED ANY OF THE FOLLOWING EVENTS N/A 8/12/2015    Procedur One level     Lives With: Alone  Drives: No  Patient Owned Equipment:  (rollator walker)       Prior Level of McDonald: Amb with rollator walker. Able to dress self.  Assist with showers    SUBJECTIVE  Too painful on the L leg    Patient self-stated goa (including a wheelchair)?: Total   -   Need to walk in hospital room?: Total   -   Climbing 3-5 steps with a railing?: Total       AM-PAC Score:  Raw Score: 9   Approx Degree of Impairment: 81.38%   Standardized Score (AM-PAC Scale): 30.55   CMS Modifier ( task tolerance with pain on the sx site affecting her bed mobilities, sitting/standing balance and stability with transfers and gt requiring the use of an A.D., increase time and significant assistance to complete task. She is non-amb at this time.   Qi

## 2021-06-14 NOTE — PLAN OF CARE
Received pt. From Ortho Spine unit for hypotension following given meds. She is alert and oriented x4. POD #1 for Left ORIF. On 3L NC. Lungs dimimished. Has denied pain all night. Taking po well. Urine output per Will Lyles.   No treatment for hypotensi

## 2021-06-14 NOTE — PROGRESS NOTES
PT BP checked, BP low. Blood sugar checked. RR called. Pt set in trendelenburg, IVF bolus infusing. BP cycling Q 5 Min.

## 2021-06-14 NOTE — PROGRESS NOTES
HGB returned as 8.4. PT placed flat. IVF bolus continuing to infuse. Cycling Q 5 min. Pt states she feels less dizzy.

## 2021-06-14 NOTE — PLAN OF CARE
PT AOX4 this PM. POD 0, gauze and paper tape dressing CDI with scant old drainage. 2L O2 prn per face mask while asleep, PT desatting with NC.  HR tachy, up to 120s at times. Notified MD. [id number] Luma Gonzalez to give additional dose coreg 12.5 tonight.  Pageed ortho to kathy

## 2021-06-14 NOTE — PROGRESS NOTES
Patient transferred from ICU, assumed care at 1500. Patient is alert & oriented x4. Pleasant, follows commands. Oriented to room. Pt's daughters at bedside. Vital signs stable, stable at 3L O2. RA baseline. Tele- Afib (controlled HR in 90s-100s).  C/o L hip

## 2021-06-14 NOTE — CM/SW NOTE
06/14/21 1400   CM/SW Referral Data   Referral Source Social Work (self-referral)   Reason for Referral Discharge planning   Informant Patient; Children   Patient Info   Patient's Mental Status Alert;Oriented   Patient's Home Environment Assisted Living

## 2021-06-14 NOTE — PROGRESS NOTES
Spoke with DR. Max Cowart to update on PT status after RR. BP medications held. 0.9 at 100 ml/hr ordered post bolus. Chest Xray ordered. Cardiology updated on PT status. Order to discontinue IVFS and watch BP.  If BP sustains below 85 systolic and symptomatic or

## 2021-06-14 NOTE — CONSULTS
PHYSICAL MEDICINE AND REHABILITATION CONSULTATION       Location [address] [place] [address] 3SW-A Attending Emily Johnson MD   [address] Day # 2 PCP Lizette Garber MD     Patient Identification  Deon Davis is a 80year old female.   :  1933  Admit Date:   2.5 mg, Nebulization, Q6H PRN  dronedarone HCl (MULTAQ) tab 400 mg, 400 mg, Oral, BID with meals  Fluticasone Furoate-Vilanterol (BREO ELLIPTA) 100-25 MCG/INH inhaler 1 puff, 1 puff, Inhalation, Daily  Pantoprazole Sodium (PROTONIX) EC tab 20 mg, 20 mg, Or injection 4 mg, 4 mg, Intravenous, Once  Sodium Chloride (PF) 0.9 % solution 10 mL, 10 mL, Injection, PRN  [COMPLETED] morphINE sulfate (PF) 2 MG/ML injection, , ,   [COMPLETED] morphINE sulfate (PF) 2 MG/ML injection 2 mg, 2 mg, Intravenous, Once        A CONTRAST, DX/THERAPEUTIC SUBSTANCE, EPIDURAL/SUBARACHNOID; LUMBAR/SACRAL N/A 8/25/2014    Procedure: LUMBAR EPIDURAL;  Surgeon: Omar Gandara MD;  Location: [facility] FOR PAIN MANAGEMENT   • INJECTION, W/WO CONTRAST, DX/THERAPEUTIC SUBSTANCE, EPIDURAL/S Surgeon: Leonora Corbett MD;  Location: [address] [place] [address]   • PATIENT DOCUMENTED NOT TO HAVE EXPERIENCED ANY OF THE FOLLOWING EVENTS N/A 9/15/2015    Procedure: LUMBAR EPIDURAL;  Surgeon: Leonora Corbett MD;  Location: [address] [place] [address] Occupation: [employer]    Tobacco Use      Smoking status: Former Smoker        Packs/day: 1.00        Years: 35.00        Pack years: 35        Types: Cigarettes        Quit date: 1987        Years since quittin.4      Smokeless tobacco: Never U 06/14/2021    CREATSERUM 1.03 06/14/2021    BUN 24 06/14/2021     06/14/2021    K 4.2 06/14/2021     06/14/2021    CO2 26.0 06/14/2021     06/14/2021    CA 8.5 06/14/2021    ALB 2.2 06/14/2021    ALKPHO 71 06/14/2021    BILT 0.5 06/14/20

## 2021-06-14 NOTE — PLAN OF CARE
Pt transferred to room 375 at 1415 today. Phone report given to WedWu Kit at [phone]. Taken via bed by transporter. Family aware of room transfer. All belongings and inpt meds sent. No acute issues during my care this morning.  Worked with PT/OT gilbert corey risk of falls.   - Bly fall precautions as indicated by assessment.  - Educate pt/family on patient safety including physical limitations  - Instruct pt to call for assistance with activity based on assessment  - Modify environment to reduce risk of i

## 2021-06-14 NOTE — PROGRESS NOTES
Anesthesia Pain Service    POD# 1 s/p Other: intermedullary nailing left femoral fracture    Block type: Lower extremity: Fascia Iliaca  Done in ED preoperatively  Laterality: left    Injection technique: Single shot    Post op review: No evidence of immed

## 2021-06-14 NOTE — PROGRESS NOTES
[facility]  RAPID RESPONSE NOTE     Pj Hill Patient Status:  Inpatient    1933 MRN [id number]   [facility] 3SW-A Attending Soraya Santos MD   Hosp Day # 1 PCP Yolanda Can MD     Reason for RRT: hypotension    Physic

## 2021-06-14 NOTE — CONSULTS
[facility] PULMONARY/CRITICAL CARE CONSULTATION       HPI: Simran Montoya is a 80year old female with a history of COPD, diastolic heart failure, atrial fibrillation, arthritis, DM2, and bladder cancer who presented to the ER 6/12 after she fell and suffered a 8/25/2014    Procedure: LUMBAR EPIDURAL;  Surgeon: Allyson Eli MD;  Location: [address] [facility] [address] PAIN MANAGEMENT   • INJECTION, W/WO CONTRAST, DX/THERAPEUTIC SUBSTANCE, EPIDURAL/SUBARACHNOID; LUMBAR/SACRAL N/A 9/8/2014    Procedure: LUMBAR EPIDURAL;  Surge PATIENT DOCUMENTED NOT TO HAVE EXPERIENCED ANY OF THE FOLLOWING EVENTS N/A 9/15/2015    Procedure: LUMBAR EPIDURAL;  Surgeon: Sanjiv Glaser MD;  Location: [facility] FOR PAIN MANAGEMENT   • PATIENT [address] FOR IV ANTIBIOTIC SURGICAL S at Unknown time  Yes Yes   Sig: Apply 1 Application topically 2 (two) times daily as needed (Apply to groin area for rash). PEG 3350 17 g Oral Powd Pack 6/11/2021 at Unknown time  Yes Yes   Sig: Take 17 g by mouth daily.    POTASSIUM CHLORIDE ER 10 MEQ Or tablet (10 mg total) by mouth nightly. loratadine (CLARITIN) 10 MG Oral Tab 6/11/2021 at Unknown time  Yes Yes   Sig: Take 1 tablet (10 mg total) by mouth daily.    omeprazole 20 MG Oral Capsule Delayed Release 6/11/2021 at Unknown time  Yes Yes   Sig: Ta cefTRIAXone Sodium (ROCEPHIN) 1 g in sodium chloride 0.9% 100 mL IVPB-ADDV, 1 g, Intravenous, Q24H  dilTIAZem HCl (cardIZEM) injection 5 mg, 5 mg, Intravenous, Q2H PRN  glycerin-Hypromellose- (ARTIFICAL TEARS) 0.2-0.2-1 % ophthalmic solution 1 drop, Blood Transfusions: Not Asked        Caffeine Concern: No        Occupational Exposure: Not Asked        Hobby Hazards: Not Asked        Sleep Concern: Not Asked        Stress Concern: Not Asked        Weight Concern: Not Asked        Special Diet: Not Ask Weight:       Height:         Oxygen Therapy  SpO2: 93 %  O2 Device: Nasal cannula  O2 Flow Rate (L/min): 3 L/min  Pulse Oximetry Type: Continuous       Gen - Alert, oriented x 3, in no apparent distress  HEENT - head normocephalic, atraumatic.  Eyes-no scl bronchospasm  -breo, incruse  -prn bd  -outpatient follow up with Dr. Kaylah Lentz  7. Nutrition - reg diet  8. Dispo - DNAR/select  -ICU--->transfer to floor    We will follow while the patient is in the ICU, then as needed. Christian Bills M.D.   Pulm

## 2021-06-14 NOTE — PROGRESS NOTES
ICU  Critical Care APRN Progress Note    NAME: Nevin Adler - ROOM: [facility] - MRN: [id number] - Age: 80year old - :1933    History Of Present Illness:  Nevin Adler is a 80year old female with PMHx as below.  [facility] [place] [facility] ED from Ta influenza    • High blood pressure    • HTN (hypertension)    • Iron deficiency anemia 10/5/2017   • Lumbar spondylosis 8/22/2014   • Microalbuminuria 8/11/2014   • Pneumonia due to organism    • Primary osteoarthritis of both hips 10/20/2015   • Prolapse Injection, w/wo contrast, dx/therapeutic substance, epidural/subarachnoid; lumbar/sacral N/A 9/15/2015    Procedure: LUMBAR EPIDURAL;  Surgeon: Kelly Jacques MD;  Location: [address] [place] [address]   • Laparoscopic cholecystectomy      3/2014   • L Surgeon: Adelina Bran MD;  Location: [facility] FOR PAIN MANAGEMENT   • Patient withough preoperative order for iv antibiotic surgical site infection prophylaxis.  N/A 8/27/2015    Procedure: LUMBAR EPIDURAL;  Surgeon: Adelina Bran MD;  Location: [facility] Soft, non-tender, bowel sounds active all four quadrants, no masses, no organomegaly  Extremities: Scattered ecchymosis to extremities, incision to left hip with dressing in place, no edema or excessive bruising noted at surgical site, no cyanosis or edema greater and lesser trochanters. There is slight lateral and cephalad migration of the femoral shaft in relation to the femoral head.    Dictated by (CST): Tristan Chu MD on 6/12/2021 at 7:55 PM     Finalized by (CST): Tristan Chu MD on 6/12/2021 at 7

## 2021-06-14 NOTE — PROGRESS NOTES
[facility]  Cardiology Progress Note    Pj Hill Patient Status:  Inpatient    1933 MRN [id number]   [facility] 4SW-A Attending Soraya Santos MD   Hosp Day # 2 PCP Yolanda Can MD       Subjective: Noted events * 117* 140* 113*       Recent Labs   Lab 06/12/21 2008 06/14/21  0446   ALT 16 12*   AST 25 26   ALB 3.0* 2.2*       Recent Labs   Lab 06/14/21  0027   TROP <0.045       Allergies:     Aspirin                 HIVES    Comment:Inc risk of bleedin tablet, 1 tablet, Oral, Daily  aspirin EC tab 81 mg, 81 mg, Oral, BID  cefTRIAXone Sodium (ROCEPHIN) 1 g in sodium chloride 0.9% 100 mL IVPB-ADDV, 1 g, Intravenous, Q24H  dilTIAZem HCl (cardIZEM) injection 5 mg, 5 mg, Intravenous, Q2H PRN  glycerin-Hyprome

## 2021-06-14 NOTE — PROGRESS NOTES
[facility]  Progress Note    Albaro Olson Patient Status:  Inpatient    1933 MRN [id number]   [facility] 4SW-A Attending Angela Gutierrez MD   Hosp Day # 2 PCP Chip Godoy MD       SUBJECTIVE:  Hypotensive last night , receive PRN  magnesium hydroxide (MILK OF MAGNESIA) 400 MG/5ML suspension 30 mL, 30 mL, Oral, Daily PRN  bisacodyl (DULCOLAX) rectal suppository 10 mg, 10 mg, Rectal, Daily PRN  Fleet Enema (FLEET) 7-19 GM/118ML enema 133 mL, 1 enema, Rectal, Once PRN  ondansetron 01/17/2014 6.4 (H)   01/09/2013 6.0 (H)     HgbA1C (%)   Date Value   03/30/2021 5.8 (H)   08/04/2020 5.9 (H)   11/05/2019 5.6     TSH (mIU/mL)   Date Value   01/21/2021 3.120   11/10/2020 4.720 (H)   11/10/2020 4.720   08/04/2020 4.740 (H)   01/09/2013

## 2021-06-14 NOTE — OCCUPATIONAL THERAPY NOTE
OCCUPATIONAL THERAPY EVALUATION - INPATIENT     Room Number: 375/375-A  Evaluation Date: 6/14/2021  Type of Evaluation: Initial  Presenting Problem: Fall resulting in left hip closed fracture s/p IM nailing    Physician Order: IP Consult to Occupational Th 8/25/2014    Procedure: LUMBAR EPIDURAL;  Surgeon: Dora Verdugo MD;  Location: [facility]   • FLUOR GID & Donna Stanford NDL/CATH SPI DX/THER Lyle Federico Yeison 84 N/A 9/8/2014    Procedure: LUMBAR EPIDURAL;  Surgeon: Dora Verdugo MD;  Location: [address] [facility] [place] FOR PAIN MANAGEMENT   • PATIENT DOCUMENTED NOT TO HAVE EXPERIENCED ANY OF THE FOLLOWING EVENTS N/A 8/12/2015    Procedure: LUMBAR EPIDURAL;  Surgeon: Satya Farrar MD;  Location: [facility] PAIN MANAGEMENT   • PATIENT DOCUMENTED NOT TO HAVE EX Equipment: Walk-in shower;Grab bar  Other Equipment: None    Occupation/Status: retired  Hand Dominance: Right  Drives: No  Patient Regularly Uses: Glasses    Prior Level of Function:  Patient typically mod independent with ADLs and mobility.   Patient uses taking off regular upper body clothing?: A Lot  -   Taking care of personal grooming such as brushing teeth?: A Little  -   Eating meals?: A Little    AM-PAC Score:  Score: 12  Approx Degree of Impairment: 66.57%  Standardized Score (AM-PAC Scale): 30.6  C Short Form was completed and  this patient  is demonstrating a  67% degree impairment in activities of daily living. Research supports that patients with this level of impairment often benefit from AR[facility]DO. Sarah Huston  In this OT evaluation patient presents with the f Goals   Patient will perform eating: with set up  Patient will perform grooming: with setup  Patient will perform toileting: with mod assist    Functional Transfer Goals  Patient will transfer from supine to sit:  with min assist  Patient will transfer fro

## 2021-06-15 VITALS
RESPIRATION RATE: 15 BRPM | BODY MASS INDEX: 36.32 KG/M2 | TEMPERATURE: 98 F | WEIGHT: 205 LBS | SYSTOLIC BLOOD PRESSURE: 126 MMHG | OXYGEN SATURATION: 94 % | HEIGHT: 63 IN | DIASTOLIC BLOOD PRESSURE: 95 MMHG | HEART RATE: 54 BPM

## 2021-06-15 PROCEDURE — 99232 SBSQ HOSP IP/OBS MODERATE 35: CPT | Performed by: NURSE PRACTITIONER

## 2021-06-15 RX ORDER — ACETAMINOPHEN 325 MG/1
650 TABLET ORAL EVERY 4 HOURS PRN
Refills: 0 | Status: SHIPPED | COMMUNITY
Start: 2021-06-15

## 2021-06-15 RX ORDER — CEPHALEXIN 500 MG/1
500 CAPSULE ORAL 3 TIMES DAILY
Qty: 15 CAPSULE | Refills: 0 | Status: SHIPPED | COMMUNITY
Start: 2021-06-15 | End: 2021-09-14

## 2021-06-15 RX ORDER — CEPHALEXIN 500 MG/1
500 CAPSULE ORAL EVERY 8 HOURS SCHEDULED
Status: DISCONTINUED | OUTPATIENT
Start: 2021-06-15 | End: 2021-06-15

## 2021-06-15 RX ORDER — HYDROCODONE BITARTRATE AND ACETAMINOPHEN 5; 325 MG/1; MG/1
1 TABLET ORAL EVERY 6 HOURS PRN
Qty: 40 TABLET | Refills: 0 | Status: SHIPPED | OUTPATIENT
Start: 2021-06-15 | End: 2021-08-12 | Stop reason: ALTCHOICE

## 2021-06-15 RX ORDER — CARVEDILOL 12.5 MG/1
6.25 TABLET ORAL 2 TIMES DAILY
Qty: 28 TABLET | Refills: 3 | Status: SHIPPED | OUTPATIENT
Start: 2021-06-15 | End: 2021-12-21

## 2021-06-15 NOTE — PROGRESS NOTES
[facility]  Cardiology Progress Note    Simranayush Montoya Patient Status:  Inpatient    1933 MRN [id number]   [facility] 3SW-A Attending Sherry Ridley MD   Hosp Day # 3 PCP Don Burroughs MD     Subjective:  Pain well controlled at rates controlled. On Multaq, BB, coreg  · Hypotension, resolved. 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 Continue ASA, multraq, coreg  2.  Discharge planning for

## 2021-06-15 NOTE — CM/SW NOTE
MSW met with the patient and family at bedside to discuss choice of ARNOLDO. List and quality data provided. Patient chose [facility] [employer] - phone # [phone]. MSW reserved via [employer].   JORGE arranged [facility] for 1pm  with 2L0

## 2021-06-15 NOTE — PROGRESS NOTES
[facility] Ortho Progress Note    Subjective: Eating breakfast, patient is well-appearing. Got out of bed with OT and PT yesterday. Objective: Dressings are clean and dry. Tolerates hip range of motion without difficulty.       Imaging: Postoperative x-rays a

## 2021-06-15 NOTE — PROGRESS NOTES
Patient will be discharged to the [facility]. Gave report to [employer]. Gave paperwork to . Patient went via ambulance to the NH.

## 2021-06-15 NOTE — PROGRESS NOTES
Assumed care at 0700. Patient is A&O x4  She is on 3L NC,  She has SBP WNL. She is resting in bed with ice packs to marcela L leg. Elevated leg on pillows. Appetite good. Purewick for incont. Good urine output. Mepilex to skin tear on backside.   Bunny

## 2021-06-15 NOTE — PROGRESS NOTES
Attempted PT treatment this PM, upon arrival to pt's room EMS arrived to transport patient to [facility] PT treatment deferred as discharge taking place RN notified.

## 2021-06-15 NOTE — OCCUPATIONAL THERAPY NOTE
OT treatment session attempted, pt is being transported to [place] [address] ([facility]). DCOT 6/15. Communication w/ RN.

## 2021-06-15 NOTE — PLAN OF CARE
PT AOX4 this PM. On 3-4 L O2 per NC tonight. Worked with patient to use inscentive spirometer. Tele, switching between Afib and NSR per monitor. BP stable. IV SL. On IV rocephin Q 24HR for UTI. Voiding with pure wick. On ASA. Wearing SCDS.  Dressing to L hi

## 2021-06-16 ENCOUNTER — NURSE ONLY (OUTPATIENT)
Dept: LAB | Age: 86
End: 2021-06-16
Attending: INTERNAL MEDICINE
Payer: MEDICARE

## 2021-06-16 DIAGNOSIS — Z11.59 SPECIAL SCREENING EXAMINATION FOR VIRAL DISEASE: Primary | ICD-10-CM

## 2021-06-16 NOTE — DISCHARGE SUMMARY
[facility]  Discharge Summary    Shelly Mabry Patient Status:  Inpatient    1933 MRN [id number]   [facility] 3SW-A Attending No att. providers found   Hosp Day # 3 PCP Bre Odell MD     Date of Admission: 2021    Date as needed for constipation. , Historical, R-0    cephALEXin 500 MG Oral Cap  Take 1 capsule (500 mg total) by mouth 3 (three) times daily. , Historical, Disp-15 capsule, R-0      CONTINUE these medications which have CHANGED    carvedilol 12.5 MG Oral Tab  T Nasal Suspension  2 sprays by Each Nare route daily as needed.  , Historical    Potassium Chloride ER 10 MEQ Oral Tab CR  Take 10 mEq by mouth daily. , Historical    Probiotic Product (ALIGN) Oral Cap  Take 1 capsule by mouth daily. , Historical    loratadin

## 2021-06-17 ENCOUNTER — NURSE ONLY (OUTPATIENT)
Dept: LAB | Age: 86
End: 2021-06-17
Attending: INTERNAL MEDICINE
Payer: MEDICARE

## 2021-06-17 DIAGNOSIS — I10 ESSENTIAL HYPERTENSION, MALIGNANT: ICD-10-CM

## 2021-06-17 DIAGNOSIS — I48.91 ATRIAL FIBRILLATION (HCC): Primary | ICD-10-CM

## 2021-06-17 PROCEDURE — 80053 COMPREHEN METABOLIC PANEL: CPT

## 2021-06-17 PROCEDURE — 82306 VITAMIN D 25 HYDROXY: CPT

## 2021-06-17 PROCEDURE — 85025 COMPLETE CBC W/AUTO DIFF WBC: CPT

## 2021-06-17 PROCEDURE — 83735 ASSAY OF MAGNESIUM: CPT

## 2021-06-22 ENCOUNTER — TELEPHONE (OUTPATIENT)
Dept: ORTHOPEDICS CLINIC | Facility: CLINIC | Age: 86
End: 2021-06-22

## 2021-06-22 NOTE — TELEPHONE ENCOUNTER
Patient had left femur surgery on 6/13/21 by Dr Sowmya Mann. She is at [facility]. The nurse states the sutures look ready to be removed. Can she remove them?  Please call Janae Thomas or fax order for suture removal.     Future Appointments   Date T

## 2021-06-22 NOTE — TELEPHONE ENCOUNTER
Spoke to Cristofer Harvey at [facility] and advised of MD orders below, she will leave sutures and have patient follow up next week. Appt time and date relayed to Cristofer Harvey to set up transportation for patient.   Future Appointments   Date Time Provide

## 2021-06-24 ENCOUNTER — NURSE ONLY (OUTPATIENT)
Dept: LAB | Age: 86
End: 2021-06-24
Attending: INTERNAL MEDICINE
Payer: MEDICARE

## 2021-06-24 DIAGNOSIS — R26.2 DIFFICULTY WALKING: Primary | ICD-10-CM

## 2021-06-24 PROCEDURE — 85025 COMPLETE CBC W/AUTO DIFF WBC: CPT

## 2021-06-24 PROCEDURE — 80053 COMPREHEN METABOLIC PANEL: CPT

## 2021-06-29 ENCOUNTER — OFFICE VISIT (OUTPATIENT)
Dept: ORTHOPEDICS CLINIC | Facility: CLINIC | Age: 86
End: 2021-06-29
Payer: MEDICARE

## 2021-06-29 DIAGNOSIS — S72.002D CLOSED FRACTURE OF LEFT HIP WITH ROUTINE HEALING, SUBSEQUENT ENCOUNTER: Primary | ICD-10-CM

## 2021-06-29 PROCEDURE — 1111F DSCHRG MED/CURRENT MED MERGE: CPT | Performed by: ORTHOPAEDIC SURGERY

## 2021-06-29 PROCEDURE — 99024 POSTOP FOLLOW-UP VISIT: CPT | Performed by: ORTHOPAEDIC SURGERY

## 2021-06-29 NOTE — PROGRESS NOTES
[facility]  [address] [place] [phone]     Name: Chio Juan   MRN: [id number]  Date: 6/29/2021     REASON FOR VISIT: First Post-Surgical Visit  Date of Surgery: 6/13/2021–l to removal.  Patient tolerates hip range of motion without significant discomfort. She is able to extend her knee with mild weakness. She is overall comfortable at rest in a seated position. No obvious peripheral edema noted.    Distal neurovascular ex

## 2021-06-30 ENCOUNTER — NURSE ONLY (OUTPATIENT)
Dept: LAB | Age: 86
End: 2021-06-30
Attending: INTERNAL MEDICINE
Payer: MEDICARE

## 2021-06-30 DIAGNOSIS — R69 DIAGNOSIS UNKNOWN: Primary | ICD-10-CM

## 2021-07-01 ENCOUNTER — NURSE ONLY (OUTPATIENT)
Dept: LAB | Age: 86
End: 2021-07-01
Attending: INTERNAL MEDICINE
Payer: MEDICARE

## 2021-07-01 ENCOUNTER — MED REC SCAN ONLY (OUTPATIENT)
Dept: ORTHOPEDICS CLINIC | Facility: CLINIC | Age: 86
End: 2021-07-01

## 2021-07-01 DIAGNOSIS — E11.69 DIABETES MELLITUS ASSOCIATED WITH HORMONAL ETIOLOGY (HCC): ICD-10-CM

## 2021-07-01 DIAGNOSIS — D64.9 ANEMIA, UNSPECIFIED: Primary | ICD-10-CM

## 2021-07-01 LAB
ALBUMIN SERPL-MCNC: 2.4 G/DL (ref 3.4–5)
ALBUMIN/GLOB SERPL: 0.6 {RATIO} (ref 1–2)
ALP LIVER SERPL-CCNC: 141 U/L
ALT SERPL-CCNC: 14 U/L
ANION GAP SERPL CALC-SCNC: 5 MMOL/L (ref 0–18)
AST SERPL-CCNC: 19 U/L (ref 15–37)
BASOPHILS # BLD AUTO: 0.03 X10(3) UL (ref 0–0.2)
BASOPHILS NFR BLD AUTO: 0.4 %
BILIRUB SERPL-MCNC: 0.7 MG/DL (ref 0.1–2)
BUN BLD-MCNC: 22 MG/DL (ref 7–18)
BUN/CREAT SERPL: 21.2 (ref 10–20)
CALCIUM BLD-MCNC: 8.9 MG/DL (ref 8.5–10.1)
CHLORIDE SERPL-SCNC: 105 MMOL/L (ref 98–112)
CO2 SERPL-SCNC: 29 MMOL/L (ref 21–32)
CREAT BLD-MCNC: 1.04 MG/DL
DEPRECATED RDW RBC AUTO: 67.4 FL (ref 35.1–46.3)
EOSINOPHIL # BLD AUTO: 0.41 X10(3) UL (ref 0–0.7)
EOSINOPHIL NFR BLD AUTO: 5.8 %
ERYTHROCYTE [DISTWIDTH] IN BLOOD BY AUTOMATED COUNT: 17.2 % (ref 11–15)
GLOBULIN PLAS-MCNC: 3.7 G/DL (ref 2.8–4.4)
GLUCOSE BLD-MCNC: 65 MG/DL (ref 70–99)
HCT VFR BLD AUTO: 30.7 %
HGB BLD-MCNC: 9.1 G/DL
IMM GRANULOCYTES # BLD AUTO: 0.03 X10(3) UL (ref 0–1)
IMM GRANULOCYTES NFR BLD: 0.4 %
LYMPHOCYTES # BLD AUTO: 1.16 X10(3) UL (ref 1–4)
LYMPHOCYTES NFR BLD AUTO: 16.5 %
M PROTEIN MFR SERPL ELPH: 6.1 G/DL (ref 6.4–8.2)
MCH RBC QN AUTO: 32.2 PG (ref 26–34)
MCHC RBC AUTO-ENTMCNC: 29.6 G/DL (ref 31–37)
MCV RBC AUTO: 108.5 FL
MONOCYTES # BLD AUTO: 0.71 X10(3) UL (ref 0.1–1)
MONOCYTES NFR BLD AUTO: 10.1 %
NEUTROPHILS # BLD AUTO: 4.71 X10 (3) UL (ref 1.5–7.7)
NEUTROPHILS # BLD AUTO: 4.71 X10(3) UL (ref 1.5–7.7)
NEUTROPHILS NFR BLD AUTO: 66.8 %
OSMOLALITY SERPL CALC.SUM OF ELEC: 289 MOSM/KG (ref 275–295)
PATIENT FASTING Y/N/NP: YES
PLATELET # BLD AUTO: 183 10(3)UL (ref 150–450)
PLATELET MORPHOLOGY: NORMAL
POTASSIUM SERPL-SCNC: 3.7 MMOL/L (ref 3.5–5.1)
RBC # BLD AUTO: 2.83 X10(6)UL
SARS-COV-2 RNA RESP QL NAA+PROBE: NOT DETECTED
SODIUM SERPL-SCNC: 139 MMOL/L (ref 136–145)
WBC # BLD AUTO: 7.1 X10(3) UL (ref 4–11)

## 2021-07-01 PROCEDURE — 85025 COMPLETE CBC W/AUTO DIFF WBC: CPT

## 2021-07-01 PROCEDURE — 80053 COMPREHEN METABOLIC PANEL: CPT

## 2021-07-03 ENCOUNTER — NURSE ONLY (OUTPATIENT)
Dept: LAB | Age: 86
End: 2021-07-03
Attending: INTERNAL MEDICINE
Payer: MEDICARE

## 2021-07-03 DIAGNOSIS — M62.81 MUSCLE WEAKNESS: Primary | ICD-10-CM

## 2021-07-03 LAB
BILIRUB UR QL STRIP.AUTO: NEGATIVE
COLOR UR AUTO: YELLOW
GLUCOSE UR STRIP.AUTO-MCNC: NEGATIVE MG/DL
KETONES UR STRIP.AUTO-MCNC: NEGATIVE MG/DL
NITRITE UR QL STRIP.AUTO: NEGATIVE
PH UR STRIP.AUTO: 6 [PH] (ref 5–8)
PROT UR STRIP.AUTO-MCNC: 30 MG/DL
SP GR UR STRIP.AUTO: 1.01 (ref 1–1.03)
UROBILINOGEN UR STRIP.AUTO-MCNC: <2 MG/DL
WBC #/AREA URNS AUTO: >50 /HPF
WBC CLUMPS UR QL AUTO: PRESENT /HPF

## 2021-07-03 PROCEDURE — 87186 SC STD MICRODIL/AGAR DIL: CPT

## 2021-07-03 PROCEDURE — 87088 URINE BACTERIA CULTURE: CPT

## 2021-07-03 PROCEDURE — 81001 URINALYSIS AUTO W/SCOPE: CPT

## 2021-07-03 PROCEDURE — 87086 URINE CULTURE/COLONY COUNT: CPT

## 2021-07-13 ENCOUNTER — NURSE ONLY (OUTPATIENT)
Dept: LAB | Age: 86
End: 2021-07-13
Attending: INTERNAL MEDICINE
Payer: MEDICARE

## 2021-07-13 DIAGNOSIS — R26.2 CANNOT WALK: Primary | ICD-10-CM

## 2021-07-13 LAB
ALBUMIN SERPL-MCNC: 2.4 G/DL (ref 3.4–5)
ALBUMIN/GLOB SERPL: 0.6 {RATIO} (ref 1–2)
ALP LIVER SERPL-CCNC: 147 U/L
ALT SERPL-CCNC: 14 U/L
ANION GAP SERPL CALC-SCNC: 4 MMOL/L (ref 0–18)
AST SERPL-CCNC: 33 U/L (ref 15–37)
BASOPHILS # BLD AUTO: 0.03 X10(3) UL (ref 0–0.2)
BASOPHILS NFR BLD AUTO: 0.5 %
BILIRUB SERPL-MCNC: 0.5 MG/DL (ref 0.1–2)
BUN BLD-MCNC: 20 MG/DL (ref 7–18)
BUN/CREAT SERPL: 18.2 (ref 10–20)
CALCIUM BLD-MCNC: 9.1 MG/DL (ref 8.5–10.1)
CHLORIDE SERPL-SCNC: 107 MMOL/L (ref 98–112)
CO2 SERPL-SCNC: 30 MMOL/L (ref 21–32)
CREAT BLD-MCNC: 1.1 MG/DL
DEPRECATED RDW RBC AUTO: 64.5 FL (ref 35.1–46.3)
EOSINOPHIL # BLD AUTO: 0.3 X10(3) UL (ref 0–0.7)
EOSINOPHIL NFR BLD AUTO: 4.7 %
ERYTHROCYTE [DISTWIDTH] IN BLOOD BY AUTOMATED COUNT: 16.7 % (ref 11–15)
GLOBULIN PLAS-MCNC: 4 G/DL (ref 2.8–4.4)
GLUCOSE BLD-MCNC: 81 MG/DL (ref 70–99)
HCT VFR BLD AUTO: 32.6 %
HGB BLD-MCNC: 9.9 G/DL
IMM GRANULOCYTES # BLD AUTO: 0.03 X10(3) UL (ref 0–1)
IMM GRANULOCYTES NFR BLD: 0.5 %
LYMPHOCYTES # BLD AUTO: 0.93 X10(3) UL (ref 1–4)
LYMPHOCYTES NFR BLD AUTO: 14.6 %
M PROTEIN MFR SERPL ELPH: 6.4 G/DL (ref 6.4–8.2)
MCH RBC QN AUTO: 32.4 PG (ref 26–34)
MCHC RBC AUTO-ENTMCNC: 30.4 G/DL (ref 31–37)
MCV RBC AUTO: 106.5 FL
MONOCYTES # BLD AUTO: 0.66 X10(3) UL (ref 0.1–1)
MONOCYTES NFR BLD AUTO: 10.3 %
NEUTROPHILS # BLD AUTO: 4.44 X10 (3) UL (ref 1.5–7.7)
NEUTROPHILS # BLD AUTO: 4.44 X10(3) UL (ref 1.5–7.7)
NEUTROPHILS NFR BLD AUTO: 69.4 %
OSMOLALITY SERPL CALC.SUM OF ELEC: 294 MOSM/KG (ref 275–295)
PATIENT FASTING Y/N/NP: YES
PLATELET # BLD AUTO: 175 10(3)UL (ref 150–450)
POTASSIUM SERPL-SCNC: 4.4 MMOL/L (ref 3.5–5.1)
RBC # BLD AUTO: 3.06 X10(6)UL
SODIUM SERPL-SCNC: 141 MMOL/L (ref 136–145)
WBC # BLD AUTO: 6.4 X10(3) UL (ref 4–11)

## 2021-07-13 PROCEDURE — 80053 COMPREHEN METABOLIC PANEL: CPT

## 2021-07-13 PROCEDURE — 85025 COMPLETE CBC W/AUTO DIFF WBC: CPT

## 2021-07-28 ENCOUNTER — HOSPITAL ENCOUNTER (OUTPATIENT)
Dept: GENERAL RADIOLOGY | Age: 86
Discharge: HOME | End: 2021-07-28
Attending: ORTHOPAEDIC SURGERY
Payer: MEDICARE

## 2021-07-28 ENCOUNTER — OFFICE VISIT (OUTPATIENT)
Dept: ORTHOPEDICS CLINIC | Facility: CLINIC | Age: 86
End: 2021-07-28
Payer: MEDICARE

## 2021-07-28 DIAGNOSIS — S72.002D CLOSED FRACTURE OF LEFT HIP WITH ROUTINE HEALING, SUBSEQUENT ENCOUNTER: ICD-10-CM

## 2021-07-28 DIAGNOSIS — S72.002D CLOSED FRACTURE OF LEFT HIP WITH ROUTINE HEALING, SUBSEQUENT ENCOUNTER: Primary | ICD-10-CM

## 2021-07-28 PROCEDURE — 99024 POSTOP FOLLOW-UP VISIT: CPT | Performed by: ORTHOPAEDIC SURGERY

## 2021-07-28 PROCEDURE — 73502 X-RAY EXAM HIP UNI 2-3 VIEWS: CPT | Performed by: ORTHOPAEDIC SURGERY

## 2021-07-28 NOTE — PROGRESS NOTES
[facility]  [address] [place] [address] Ge Gonzalez [phone]     Name: Eladia Pat   MRN: [id number]  Date: 7/28/2021     REASON FOR VISIT: Second Post-Surgical Visit  Date of Surgery: 6/13/2021– largely pain-free with hip range of motion. No obvious peripheral edema noted. Distal neurovascular exam demonstrates normal perfusion, intact sensation to light touch and full strength.      Imaging studies: X-rays of the AP pelvis and left hip obtain

## 2021-09-07 ENCOUNTER — TELEPHONE (OUTPATIENT)
Dept: SURGERY | Facility: CLINIC | Age: 86
End: 2021-09-07

## 2021-09-07 NOTE — TELEPHONE ENCOUNTER
This RN received a call from [facility]. She reports that patient had a fall in June 2021 and broke her up. Was sent to rehab at [facility] and was later transferred to [facility]. She reports that patient is now completely incontinent.  Asking for rec

## 2021-09-08 ENCOUNTER — NURSE ONLY (OUTPATIENT)
Dept: LAB | Age: 86
DRG: 690 | End: 2021-09-08
Attending: INTERNAL MEDICINE
Payer: MEDICARE

## 2021-09-08 DIAGNOSIS — N39.0 UTI (URINARY TRACT INFECTION): Primary | ICD-10-CM

## 2021-09-08 PROCEDURE — 87186 SC STD MICRODIL/AGAR DIL: CPT

## 2021-09-08 PROCEDURE — 81001 URINALYSIS AUTO W/SCOPE: CPT

## 2021-09-08 PROCEDURE — 87077 CULTURE AEROBIC IDENTIFY: CPT

## 2021-09-08 PROCEDURE — 87086 URINE CULTURE/COLONY COUNT: CPT

## 2021-09-09 ENCOUNTER — HOSPITAL ENCOUNTER (INPATIENT)
Facility: HOSPITAL | Age: 86
LOS: 5 days | Discharge: NURSING, REHAB OR LONG-TERM CARE | DRG: 690 | End: 2021-09-14
Attending: EMERGENCY MEDICINE | Admitting: INTERNAL MEDICINE
Payer: MEDICARE

## 2021-09-09 ENCOUNTER — TELEPHONE (OUTPATIENT)
Dept: SURGERY | Facility: CLINIC | Age: 86
End: 2021-09-09

## 2021-09-09 DIAGNOSIS — E86.0 DEHYDRATION: ICD-10-CM

## 2021-09-09 DIAGNOSIS — N39.0 URINARY TRACT INFECTION WITHOUT HEMATURIA, SITE UNSPECIFIED: Primary | ICD-10-CM

## 2021-09-09 DIAGNOSIS — R53.1 WEAKNESS GENERALIZED: ICD-10-CM

## 2021-09-09 DIAGNOSIS — Z51.5 PALLIATIVE CARE ENCOUNTER: ICD-10-CM

## 2021-09-09 DIAGNOSIS — C67.9 MALIGNANT NEOPLASM OF URINARY BLADDER, UNSPECIFIED SITE (HCC): ICD-10-CM

## 2021-09-09 LAB
ANION GAP SERPL CALC-SCNC: 3 MMOL/L (ref 0–18)
BASOPHILS # BLD AUTO: 0.02 X10(3) UL (ref 0–0.2)
BASOPHILS NFR BLD AUTO: 0.2 %
BILIRUB UR QL STRIP.AUTO: NEGATIVE
BILIRUB UR QL STRIP.AUTO: NEGATIVE
BUN BLD-MCNC: 22 MG/DL (ref 7–18)
CALCIUM BLD-MCNC: 8.9 MG/DL (ref 8.5–10.1)
CHLORIDE SERPL-SCNC: 104 MMOL/L (ref 98–112)
CO2 SERPL-SCNC: 27 MMOL/L (ref 21–32)
COLOR UR AUTO: YELLOW
COLOR UR AUTO: YELLOW
CREAT BLD-MCNC: 1.41 MG/DL
EOSINOPHIL # BLD AUTO: 0.43 X10(3) UL (ref 0–0.7)
EOSINOPHIL NFR BLD AUTO: 4.5 %
ERYTHROCYTE [DISTWIDTH] IN BLOOD BY AUTOMATED COUNT: 14.8 %
GLUCOSE BLD-MCNC: 91 MG/DL (ref 70–99)
GLUCOSE UR STRIP.AUTO-MCNC: NEGATIVE MG/DL
GLUCOSE UR STRIP.AUTO-MCNC: NEGATIVE MG/DL
HCT VFR BLD AUTO: 39.7 %
HGB BLD-MCNC: 12.8 G/DL
HYALINE CASTS #/AREA URNS AUTO: PRESENT /LPF
IMM GRANULOCYTES # BLD AUTO: 0.05 X10(3) UL (ref 0–1)
IMM GRANULOCYTES NFR BLD: 0.5 %
KETONES UR STRIP.AUTO-MCNC: NEGATIVE MG/DL
LYMPHOCYTES # BLD AUTO: 1.02 X10(3) UL (ref 1–4)
LYMPHOCYTES NFR BLD AUTO: 10.6 %
MCH RBC QN AUTO: 31.5 PG (ref 26–34)
MCHC RBC AUTO-ENTMCNC: 32.2 G/DL (ref 31–37)
MCV RBC AUTO: 97.8 FL
MONOCYTES # BLD AUTO: 0.92 X10(3) UL (ref 0.1–1)
MONOCYTES NFR BLD AUTO: 9.5 %
NEUTROPHILS # BLD AUTO: 7.21 X10 (3) UL (ref 1.5–7.7)
NEUTROPHILS # BLD AUTO: 7.21 X10(3) UL (ref 1.5–7.7)
NEUTROPHILS NFR BLD AUTO: 74.7 %
NITRITE UR QL STRIP.AUTO: NEGATIVE
NITRITE UR QL STRIP.AUTO: NEGATIVE
OSMOLALITY SERPL CALC.SUM OF ELEC: 281 MOSM/KG (ref 275–295)
PH UR STRIP.AUTO: 7 [PH] (ref 5–8)
PH UR STRIP.AUTO: 7.5 [PH] (ref 5–8)
PLATELET # BLD AUTO: 212 10(3)UL (ref 150–450)
POTASSIUM SERPL-SCNC: 4.2 MMOL/L (ref 3.5–5.1)
PROT UR STRIP.AUTO-MCNC: 100 MG/DL
PROT UR STRIP.AUTO-MCNC: >=300 MG/DL
RBC # BLD AUTO: 4.06 X10(6)UL
RBC #/AREA URNS AUTO: >10 /HPF
RBC #/AREA URNS AUTO: >10 /HPF
SARS-COV-2 RNA RESP QL NAA+PROBE: NOT DETECTED
SODIUM SERPL-SCNC: 134 MMOL/L (ref 136–145)
SP GR UR STRIP.AUTO: 1.01 (ref 1–1.03)
SP GR UR STRIP.AUTO: 1.02 (ref 1–1.03)
UROBILINOGEN UR STRIP.AUTO-MCNC: 0.2 MG/DL
UROBILINOGEN UR STRIP.AUTO-MCNC: <2 MG/DL
WBC # BLD AUTO: 9.7 X10(3) UL (ref 4–11)
WBC #/AREA URNS AUTO: >50 /HPF
WBC #/AREA URNS AUTO: >50 /HPF
WBC CLUMPS UR QL AUTO: PRESENT /HPF
WBC CLUMPS UR QL AUTO: PRESENT /HPF

## 2021-09-09 RX ORDER — SODIUM CHLORIDE 9 MG/ML
INJECTION, SOLUTION INTRAVENOUS CONTINUOUS
Status: DISCONTINUED | OUTPATIENT
Start: 2021-09-09 | End: 2021-09-09

## 2021-09-09 RX ORDER — MELATONIN
325
Status: DISCONTINUED | OUTPATIENT
Start: 2021-09-10 | End: 2021-09-14

## 2021-09-09 RX ORDER — TORSEMIDE 20 MG/1
40 TABLET ORAL DAILY
Status: DISCONTINUED | OUTPATIENT
Start: 2021-09-10 | End: 2021-09-14

## 2021-09-09 RX ORDER — PHENAZOPYRIDINE HYDROCHLORIDE 100 MG/1
95 TABLET, FILM COATED ORAL 3 TIMES DAILY PRN
Status: DISCONTINUED | OUTPATIENT
Start: 2021-09-09 | End: 2021-09-09

## 2021-09-09 RX ORDER — ALBUTEROL SULFATE 2.5 MG/3ML
2.5 SOLUTION RESPIRATORY (INHALATION) EVERY 6 HOURS PRN
Status: DISCONTINUED | OUTPATIENT
Start: 2021-09-09 | End: 2021-09-14

## 2021-09-09 RX ORDER — POLYETHYLENE GLYCOL 3350 17 G/17G
17 POWDER, FOR SOLUTION ORAL DAILY
Status: DISCONTINUED | OUTPATIENT
Start: 2021-09-09 | End: 2021-09-14

## 2021-09-09 RX ORDER — CARVEDILOL 6.25 MG/1
6.25 TABLET ORAL 2 TIMES DAILY
Status: DISCONTINUED | OUTPATIENT
Start: 2021-09-09 | End: 2021-09-14

## 2021-09-09 RX ORDER — ROPINIROLE 0.5 MG/1
0.5 TABLET, FILM COATED ORAL NIGHTLY
Status: DISCONTINUED | OUTPATIENT
Start: 2021-09-10 | End: 2021-09-14

## 2021-09-09 RX ORDER — PHENAZOPYRIDINE HYDROCHLORIDE 100 MG/1
100 TABLET, FILM COATED ORAL 3 TIMES DAILY PRN
Status: DISCONTINUED | OUTPATIENT
Start: 2021-09-09 | End: 2021-09-14

## 2021-09-09 RX ORDER — FLUTICASONE PROPIONATE 50 MCG
2 SPRAY, SUSPENSION (ML) NASAL DAILY
Status: DISCONTINUED | OUTPATIENT
Start: 2021-09-09 | End: 2021-09-14

## 2021-09-09 RX ORDER — SODIUM CHLORIDE 9 MG/ML
125 INJECTION, SOLUTION INTRAVENOUS CONTINUOUS
Status: DISCONTINUED | OUTPATIENT
Start: 2021-09-09 | End: 2021-09-09

## 2021-09-09 RX ORDER — POTASSIUM CHLORIDE 750 MG/1
10 TABLET, EXTENDED RELEASE ORAL DAILY
Status: DISCONTINUED | OUTPATIENT
Start: 2021-09-10 | End: 2021-09-14

## 2021-09-09 RX ORDER — PANTOPRAZOLE SODIUM 20 MG/1
20 TABLET, DELAYED RELEASE ORAL
Status: DISCONTINUED | OUTPATIENT
Start: 2021-09-10 | End: 2021-09-14

## 2021-09-09 RX ORDER — ACETAMINOPHEN 325 MG/1
650 TABLET ORAL EVERY 4 HOURS PRN
Status: DISCONTINUED | OUTPATIENT
Start: 2021-09-09 | End: 2021-09-14

## 2021-09-09 NOTE — ED INITIAL ASSESSMENT (HPI)
Vaginal bleeding, passed a few clots, was directed by PCP to go to ER for evaluation if still happening, pt has history of bladder Ca and has had this happen in the past.

## 2021-09-09 NOTE — ED PROVIDER NOTES
Patient Seen in: [facility] Emergency Department      History   Patient presents with:  Urinary Symptoms    Stated Complaint: hematuria, bladder cancer    HPI/Subjective:   HPI    80-year-old female with history of bladder cancer not undergoing domenic • HERNIA SURGERY     • HYSTERECTOMY     • INJECTION, W/WO CONTRAST, DX/THERAPEUTIC SUBSTANCE, EPIDURAL/SUBARACHNOID; LUMBAR/SACRAL N/A 8/25/2014    Procedure: LUMBAR EPIDURAL;  Surgeon: Kika Gilliland MD;  Location: [address] [place] [address]   • IN FOLLOWING EVENTS N/A 8/27/2015    Procedure: LUMBAR EPIDURAL;  Surgeon: Go Esquivel MD;  Location: [address] [place] [address]   • PATIENT DOCUMENTED NOT TO HAVE EXPERIENCED ANY OF THE FOLLOWING EVENTS N/A 9/15/2015    Procedure: LUMBAR EPIDURAL;  S Temp 96.9 °F (36.1 °C)   Resp 22   Ht 160 cm (5' 3\")   Wt 90.7 kg   SpO2 93%   BMI 35.43 kg/m²         Physical Exam    GENERAL: Patient is awake, alert,  in no acute distress. HEENT: no scleral icterus.   Mucous membranes are moderately dry  HEART: Regul with urinary tract infection. Chemistry sodium 134 potassium 4.2 BUN 22 creatinine 1.41 glucose 91. CBC unremarkable. Patient received IV fluid hydration and dose of IV ceftriaxone.   Patient is generally weak and dehydrated with difficulty taking oral f

## 2021-09-09 NOTE — TELEPHONE ENCOUNTER
This RN received a message from Hubert Cutler, daughter with message below: \"Hi Sebas Cachorro, It's Hannah Chatterjee's daughter.  68-. We spoke a couple of days ago.  Mom has bladder cancer diagnosed in  and requests comfort measures on

## 2021-09-09 NOTE — TELEPHONE ENCOUNTER
This encounter is now closed. RN also spoke to patient at length and explained MD's recommendations.

## 2021-09-10 ENCOUNTER — TELEPHONE (OUTPATIENT)
Dept: SURGERY | Facility: CLINIC | Age: 86
End: 2021-09-10

## 2021-09-10 LAB
ALBUMIN SERPL-MCNC: 2 G/DL (ref 3.4–5)
ALBUMIN/GLOB SERPL: 0.5 {RATIO} (ref 1–2)
ALP LIVER SERPL-CCNC: 112 U/L
ALT SERPL-CCNC: 9 U/L
ANION GAP SERPL CALC-SCNC: 6 MMOL/L (ref 0–18)
AST SERPL-CCNC: 14 U/L (ref 15–37)
BASOPHILS # BLD AUTO: 0.04 X10(3) UL (ref 0–0.2)
BASOPHILS NFR BLD AUTO: 0.5 %
BILIRUB SERPL-MCNC: 0.4 MG/DL (ref 0.1–2)
BUN BLD-MCNC: 22 MG/DL (ref 7–18)
CALCIUM BLD-MCNC: 8.6 MG/DL (ref 8.5–10.1)
CHLORIDE SERPL-SCNC: 108 MMOL/L (ref 98–112)
CO2 SERPL-SCNC: 24 MMOL/L (ref 21–32)
CREAT BLD-MCNC: 1.3 MG/DL
EOSINOPHIL # BLD AUTO: 0.47 X10(3) UL (ref 0–0.7)
EOSINOPHIL NFR BLD AUTO: 6.1 %
ERYTHROCYTE [DISTWIDTH] IN BLOOD BY AUTOMATED COUNT: 15 %
GLOBULIN PLAS-MCNC: 4.2 G/DL (ref 2.8–4.4)
GLUCOSE BLD-MCNC: 80 MG/DL (ref 70–99)
HCT VFR BLD AUTO: 34.3 %
HGB BLD-MCNC: 11.2 G/DL
IMM GRANULOCYTES # BLD AUTO: 0.05 X10(3) UL (ref 0–1)
IMM GRANULOCYTES NFR BLD: 0.6 %
LYMPHOCYTES # BLD AUTO: 0.7 X10(3) UL (ref 1–4)
LYMPHOCYTES NFR BLD AUTO: 9.1 %
M PROTEIN MFR SERPL ELPH: 6.2 G/DL (ref 6.4–8.2)
MCH RBC QN AUTO: 31.8 PG (ref 26–34)
MCHC RBC AUTO-ENTMCNC: 32.7 G/DL (ref 31–37)
MCV RBC AUTO: 97.4 FL
MONOCYTES # BLD AUTO: 0.82 X10(3) UL (ref 0.1–1)
MONOCYTES NFR BLD AUTO: 10.6 %
NEUTROPHILS # BLD AUTO: 5.63 X10 (3) UL (ref 1.5–7.7)
NEUTROPHILS # BLD AUTO: 5.63 X10(3) UL (ref 1.5–7.7)
NEUTROPHILS NFR BLD AUTO: 73.1 %
OSMOLALITY SERPL CALC.SUM OF ELEC: 288 MOSM/KG (ref 275–295)
PLATELET # BLD AUTO: 168 10(3)UL (ref 150–450)
POTASSIUM SERPL-SCNC: 4 MMOL/L (ref 3.5–5.1)
RBC # BLD AUTO: 3.52 X10(6)UL
SODIUM SERPL-SCNC: 138 MMOL/L (ref 136–145)
WBC # BLD AUTO: 7.7 X10(3) UL (ref 4–11)

## 2021-09-10 PROCEDURE — 99223 1ST HOSP IP/OBS HIGH 75: CPT | Performed by: PHYSICIAN ASSISTANT

## 2021-09-10 NOTE — PLAN OF CARE
Assumed care of pt at 2144. Pt AOx4. RA.  . SB.  Pt no c/o pain. IV ABX. Right leg has lorenzo boot with pt stated cannot come off to promote healing of wound on right ankle. Will continue to monitor.

## 2021-09-10 NOTE — H&P
[facility] [address] Patient Status:  Inpatient    1933 MRN [id number]   [facility] 7NE-A Attending Mich Verde MD   [facility] Day # 1 PCP Nichola Boast, MD     History of Present Illness:  Luz Maria Ireland orin   • Venous insufficiency    • Ventral hernia 8/11/2014     Past Surgical History:   Procedure Laterality Date   • APPENDECTOMY     • CHOLECYSTECTOMY     • COLONOSCOPY      3/2014   • EGD     • FLUOR GID & Colleen Torres NDL/CATH SPI DX/THER NJX N/A 8/25/201 EPIDURAL;  Surgeon: Deanna Hernández MD;  Location: [address] [place] [address]   • PATIENT DOCUMENTED NOT TO HAVE EXPERIENCED ANY OF THE FOLLOWING EVENTS N/A 9/8/2014    Procedure: LUMBAR EPIDURAL;  Surgeon: Deanna Hernández MD;  Location: [address] [place] [address] UMBILICAL HERNIA REPAIR  3/31/14    DR. WORKMAN     Family History   Problem Relation Age of Onset   • Cancer Father         Prostate   • Prostate Cancer Father    • Ovarian Cancer Mother    • Heart Attack Brother    • Hypertension Brother    • Other (CAD) B Take 325 mg by mouth daily with breakfast., Disp: , Rfl: , Taking at 0800  Nystatin 049243 UNIT/GM External Powder, Apply 1 Application topically 2 (two) times daily as needed (Apply to groin area for rash). , Disp: , Rfl: , 9/9/2021 at 0800  Umeclidinium B 97.8 °F (36.6 °C), temperature source Oral, resp. rate 18, height 5' 3\" (1.6 m), weight 200 lb (90.7 kg), SpO2 96 %. General: No acute distress. Alert and oriented x 3. HEENT: Moist mucous membranes. EOM-I. PERRL  Neck: No lymphadenopathy. No JVD.  No c emphysema ([facility] [place] [address]     Gross hematuria     Acute cystitis with hematuria     Urinary retention     Malignant neoplasm of urinary bladder (HCC)     Hematuria     DARIEN (acute kidney injury) ([facility] [address]     Retention of urine     Chronic obstructive pulmonary disease (

## 2021-09-10 NOTE — TELEPHONE ENCOUNTER
This RN received a message call from Phyllis Marcos for updates:    \"FYI: Mom to ER by 3pm (Thurs) yesterday, and admitted after 9:30 PM. Admitted with UTI infection. The family sure hopes she'll have a Urology consult, however we don't think she be there long. \"

## 2021-09-10 NOTE — CM/SW NOTE
Pt is an 81 yo female admitted for UTI. Pt is from [facility]. PT/OT to see.   / to remain available for support and/or discharge planning.      09/10/21 1300   CM/SW Referral Data   Referral Source Social Work (self-referral

## 2021-09-10 NOTE — CONSULTS
[facility]    Report of Consultation    Nevin Biles Patient Status:  Inpatient    1933 MRN [id number]   [facility] 7NE-A Attending Lila Resendiz MD   [address] Day # 1 PCP Vincent Naik MD     Date of Admission:  2021  Date history of recurrent UTIs, 6-7/year. Was treated for Proteus UTI in February, June, and July prior to this admission.  During admission in March she had CT completed that showed right hydronephrosis, intraoperative findings of bilateral ureteral reflux, no COLONOSCOPY      3/2014    EGD      FLUOR GID & Zunilda James NDL/CATH SPI DX/THER NJX N/A 8/25/2014    Procedure: LUMBAR EPIDURAL;  Surgeon: Lico Benavides MD;  Location: [place] [address] GID & Zunilda James NDL/CATH SPI DX/THER [facility] N/A 9/8 Procedure: LUMBAR EPIDURAL;  Surgeon: Aubree Keith MD;  Location: [address] [place] [address]    PATIENT DOCUMENTED NOT TO HAVE EXPERIENCED ANY OF THE FOLLOWING EVENTS N/A 8/12/2015    Procedure: LUMBAR EPIDURAL;  Surgeon: Aubree Keith MD;  Luke Hernandez Heart Attack Brother     Hypertension Brother     Other (CAD) Brother     Cancer Daughter 44        BREAST CA      reports that she quit smoking about 34 years ago. Her smoking use included cigarettes. She has a 35.00 pack-year smoking history.  She has nev Intravenous, Q24H    phenazopyridine (PYRIDIUM) tab 100 mg, 100 mg, Oral, TID PRN    Review of Systems:  Pertinent items are noted in HPI.     Physical Exam:  /82 (BP Location: Left arm)   Pulse 64   Temp 97.9 °F (36.6 °C) (Oral)   Resp 18   Ht 5' 3\" BILUR  --  Negative Negative Negative Negative Negative Negative Negative Negative Negative Negative   BLOODURINE  --  Moderate* Small* Moderate* Large* Large* Large* Moderate* Moderate* Large* Large*   NITRITE  --  Positive* Negative Positive* Negative abdominal pain gross hematuria       TECHNIQUE:  Unenhanced multislice CT scanning from above the kidneys to below the urinary bladder. 2D rendering are generated on the CT scanner workstation to localize potential stones in the cranio-caudal plane.   Dose AORTA/VASCULAR:  No aortic aneurysm. RETROPERITONEUM:  Unremarkable. BOWEL/MESENTERY:  No acute process. ABDOMINAL WALL:  Unremarkable. BONES:  No acute process seen. PELVIC ORGANS:  Unremarkable.        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

## 2021-09-10 NOTE — PLAN OF CARE
Assumed care at 0730  A&Ox4  On RA, NSR on tele  Denies pain  IV abx given per order  Incontinent, purewick in place  US kidney/bladder ordered  Family updated on poc  Will continue to monitor

## 2021-09-11 ENCOUNTER — APPOINTMENT (OUTPATIENT)
Dept: CT IMAGING | Facility: HOSPITAL | Age: 86
DRG: 690 | End: 2021-09-11
Attending: UROLOGY
Payer: MEDICARE

## 2021-09-11 ENCOUNTER — APPOINTMENT (OUTPATIENT)
Dept: ULTRASOUND IMAGING | Facility: HOSPITAL | Age: 86
DRG: 690 | End: 2021-09-11
Attending: INTERNAL MEDICINE
Payer: MEDICARE

## 2021-09-11 PROCEDURE — 74176 CT ABD & PELVIS W/O CONTRAST: CPT | Performed by: UROLOGY

## 2021-09-11 PROCEDURE — 99232 SBSQ HOSP IP/OBS MODERATE 35: CPT | Performed by: UROLOGY

## 2021-09-11 PROCEDURE — 76770 US EXAM ABDO BACK WALL COMP: CPT | Performed by: INTERNAL MEDICINE

## 2021-09-11 NOTE — PLAN OF CARE
Assumed care at 0700  Pt AxOx4, SR on tele, RA  No c/o pain  Incontinent, purewick in place  IV abx given as scheduled  Up to chair during afternoon. US kidney/bladder completed.  CT abd/pelvis ordered by urology  Palliative consulted for [address]  Daughter at

## 2021-09-11 NOTE — PROGRESS NOTES
[facility] Urology  Consult Follow-Up Note    Sherol Simpler Patient Status:  Inpatient    1933 MRN [id number]   [facility] 7NE-A Attending Kisha Oliver MD   Hosp Day # 2 PCP Rom Chowdhury MD     Subjective: Assessment and Plan:   80year old female with PMH of anxiety, CVA, COPD, afib (on xarelto), type II DM, muscle invasive bladder cancer with patient declining palliative or definitive therapy, who presented to the ED for dysuria, urgency/frequency, and Dr. Tamiko Fan back this week.        25 mintues were spent on this visit including face-to-face discussion involving counseling, further management and treatment planning, reviewing testing and interpreting imaging results, ordering new testing, and documenting

## 2021-09-11 NOTE — OCCUPATIONAL THERAPY NOTE
OCCUPATIONAL THERAPY QUICK EVALUATION - INPATIENT    Room Number: 9772/0488-A  Evaluation Date: 9/11/2021     Type of Evaluation: Quick Eval  Presenting Problem: UTI    Physician Order: IP Consult to Occupational Therapy  Reason for Therapy:  ADL/IADL Dysf COLONOSCOPY      3/2014   • EGD     • FLUOR GID & Liv Balls NDL/CATH SPI DX/THER NJX N/A 8/25/2014    Procedure: LUMBAR EPIDURAL;  Surgeon: Ashley Valencia MD;  Location: [address] [place] [address]   • FLUOR GID & Liv Balls NDL/CATH SPI DX/THER Lyle Federico Yeison 84 N/A 9/8/2 9/8/2014    Procedure: LUMBAR EPIDURAL;  Surgeon: Jose Cueva MD;  Location: [address] [place] [address]   • PATIENT DOCUMENTED NOT TO HAVE EXPERIENCED ANY OF THE FOLLOWING EVENTS N/A 8/12/2015    Procedure: LUMBAR EPIDURAL;  Surgeon: Jose Cueva Staff 24 hours    Toilet and Equipment: Grab bar; Comfort height toilet  Shower/Tub and Equipment: Walk-in shower;  Tub seat; Hand-held showerhead  Other Equipment:  (W/C; RW)    Occupation/Status: Retired  Hand Dominance: Right  Drives: No  Patient Regular Score:   Score: 14  Approx Degree of Impairment: 59.67%  Standardized Score (AM-PAC Scale): 33.39  CMS Modifier (G-Code): CK    FUNCTIONAL TRANSFER ASSESSMENT  Supine to Sit : Moderate assistance  Sit to Stand: Maximum assistance    Skilled Therapy Provided currently at her baseline for ADL and functional transfers. Patient discharged from Occupational Therapy services. Please re-order if a new functional limitation presents during this admission.     Patient was able to achieve the following goals:  Patient

## 2021-09-11 NOTE — PHYSICAL THERAPY NOTE
PHYSICAL THERAPY QUICK EVALUATION - INPATIENT    Room Number: 7410/1911-T  Evaluation Date: 9/11/2021  Presenting Problem: UTI w/o hematuria  Physician Order: PT Eval and Treat    History of current admission: Pt admitted from ED on 9/9/21 [place] [facility] for [facility]GEMENT   • HERNIA SURGERY     • HYSTERECTOMY     • INJECTION, W/WO CONTRAST, DX/THERAPEUTIC SUBSTANCE, EPIDURAL/SUBARACHNOID; LUMBAR/SACRAL N/A 8/25/2014    Procedure: LUMBAR EPIDURAL;  Surgeon: Chele Zhu MD;  Location: [address] [facility] EXPERIENCED ANY OF THE FOLLOWING EVENTS N/A 8/27/2015    Procedure: LUMBAR EPIDURAL;  Surgeon: Chele Zhu MD;  Location: [address] [place] [address]   • PATIENT DOCUMENTED NOT TO HAVE EXPERIENCED ANY OF THE FOLLOWING EVENTS N/A 9/15/2015    Procedu get back to walking\"    OBJECTIVE  Precautions: Bed/chair alarm  Fall Risk: High fall risk    WEIGHT BEARING RESTRICTION  Weight Bearing Restriction: None                PAIN ASSESSMENT  Ratin         RANGE OF MOTION AND STRENGTH ASSESSMENT  Upper ext hand placement on bed rails. Pt able to sit EOB x 5 min with good static sitting balance during strength testing and education for RW use. Pt with mild lightheadedness symptoms in sitting.  Pt performed sit>stand with RW and min A x 2, required verbal cues presents during this admission. GOALS  Patient was able to achieve the following goals . ..     Patient was able to transfer At previous, functional level   Patient able to ambulate on level surfaces At previous, functional level

## 2021-09-11 NOTE — PLAN OF CARE
Assumed care at [address]. Alert and oriented x4. Telemetry monitor reading SR/SB. No pain. Fall precautions maintained per protocol. Plan for US kidney and bladder. Call light in reach at bedside. Will continue to monitor.        Problem: Patient/Family Goals Identify cognitive and physical deficits and behaviors that affect risk of falls.   - [place] fall precautions as indicated by assessment.  - Educate pt/family on patient safety including physical limitations  - Instruct pt to call for assistance with act

## 2021-09-11 NOTE — PROGRESS NOTES
[facility]  Progress Note    Marcial Arrow Patient Status:  Inpatient    1933 MRN [id number]   [facility] 7NE-A Attending Sidney Arndt MD   Hosp Day # 2 PCP Vesta Chacko MD       SUBJECTIVE:  In bed, Await kidney US and woun IVPB-ADDV, 1 g, Intravenous, Q24H  phenazopyridine (PYRIDIUM) tab 100 mg, 100 mg, Oral, TID PRN        Exam:  Gen: No acute distress, alert and oriented x3, no focal neurologic deficits  Pulm: Lungs clear, normal respiratory effort  CV: Heart with regular hematuria     Urinary retention     Malignant neoplasm of urinary bladder (HCC)     Hematuria     DARIEN (acute kidney injury) ([facility]     Retention of urine     Chronic obstructive pulmonary disease (HCC)     Unsteady gait     Closed fracture of left hip ([facility] [address]

## 2021-09-12 LAB
ANION GAP SERPL CALC-SCNC: 5 MMOL/L (ref 0–18)
BASOPHILS # BLD AUTO: 0.05 X10(3) UL (ref 0–0.2)
BASOPHILS NFR BLD AUTO: 0.6 %
BUN BLD-MCNC: 23 MG/DL (ref 7–18)
CALCIUM BLD-MCNC: 9.2 MG/DL (ref 8.5–10.1)
CHLORIDE SERPL-SCNC: 104 MMOL/L (ref 98–112)
CO2 SERPL-SCNC: 28 MMOL/L (ref 21–32)
CREAT BLD-MCNC: 1.44 MG/DL
EOSINOPHIL # BLD AUTO: 0.53 X10(3) UL (ref 0–0.7)
EOSINOPHIL NFR BLD AUTO: 6.7 %
ERYTHROCYTE [DISTWIDTH] IN BLOOD BY AUTOMATED COUNT: 14.8 %
GLUCOSE BLD-MCNC: 84 MG/DL (ref 70–99)
HCT VFR BLD AUTO: 36.8 %
HGB BLD-MCNC: 11.6 G/DL
IMM GRANULOCYTES # BLD AUTO: 0.04 X10(3) UL (ref 0–1)
IMM GRANULOCYTES NFR BLD: 0.5 %
LYMPHOCYTES # BLD AUTO: 0.91 X10(3) UL (ref 1–4)
LYMPHOCYTES NFR BLD AUTO: 11.5 %
MCH RBC QN AUTO: 31.4 PG (ref 26–34)
MCHC RBC AUTO-ENTMCNC: 31.5 G/DL (ref 31–37)
MCV RBC AUTO: 99.5 FL
MONOCYTES # BLD AUTO: 0.95 X10(3) UL (ref 0.1–1)
MONOCYTES NFR BLD AUTO: 12 %
NEUTROPHILS # BLD AUTO: 5.45 X10 (3) UL (ref 1.5–7.7)
NEUTROPHILS # BLD AUTO: 5.45 X10(3) UL (ref 1.5–7.7)
NEUTROPHILS NFR BLD AUTO: 68.7 %
OSMOLALITY SERPL CALC.SUM OF ELEC: 287 MOSM/KG (ref 275–295)
PLATELET # BLD AUTO: 177 10(3)UL (ref 150–450)
POTASSIUM SERPL-SCNC: 3.7 MMOL/L (ref 3.5–5.1)
RBC # BLD AUTO: 3.7 X10(6)UL
SODIUM SERPL-SCNC: 137 MMOL/L (ref 136–145)
WBC # BLD AUTO: 7.9 X10(3) UL (ref 4–11)

## 2021-09-12 NOTE — PLAN OF CARE
Assumed care at 1930  A&Ox4, on RA, SR on tele  R ankle dressing c/d/I  Redness to pannus/groin - miconazole powder applied  Plan is for abx, await final urine cx, palliative to see  Call light in reach            Problem: PAIN - ADULT  Goal: Verbalizes/di resources  Description: INTERVENTIONS:  - Identify barriers to discharge w/pt and caregiver  - Include patient/family/discharge partner in discharge planning  - Arrange for needed discharge resources and transportation as appropriate  - Identify discharge

## 2021-09-12 NOTE — PLAN OF CARE
Assumed care for pt at 23:30 on 9/11    A&Ox4. Denies pain or KIM. During sleep, SpO2 down to 84%, placed on 2L. VSS on 2L while sleeping, nsr on tele with occasional PVCs. Incontinent of urine, purewick changed ~ 22:00.  WC bound, up 2 assist pivot    Plan

## 2021-09-12 NOTE — PROGRESS NOTES
[facility]  Progress Note    Alex Jackson Patient Status:  Inpatient    1933 MRN [id number]   [facility] 7NE-A Attending Parley Buerger, MD   Hosp Day # 3 PCP Oliver Virk MD       SUBJECTIVE:  Up in chair     OBJECTIVE:  Cande Q24H  phenazopyridine (PYRIDIUM) tab 100 mg, 100 mg, Oral, TID PRN        Exam:  Gen: No acute distress, alert and oriented x3, no focal neurologic deficits  Pulm: Lungs clear, normal respiratory effort  CV: Heart with regular rate and rhythm, no periphera fibrillation (HCC)     CHF (congestive heart failure) (HCC)     Recurrent UTI     Pre-diabetes     Parotid mass     Thrombocytopenia (HCC)     Other emphysema (HCC)     Gross hematuria     Acute cystitis with hematuria     Urinary retention     Malignant n

## 2021-09-12 NOTE — PLAN OF CARE
Assumed care at 0700. Pt alert and oriented. Up in chair with 2 assist and did well. Enid Oka in place with good output. Spoke with Dr Anthony Petit and Dr Byron Eller regarding plan of care.  Regarding bladder-pt's primary urologist will Dr Philip Parker will be back to be in

## 2021-09-12 NOTE — PLAN OF CARE
Problem: DISCHARGE PLANNING  Goal: Discharge to home or other facility with appropriate resources  Description: INTERVENTIONS:  - Identify barriers to discharge w/pt and caregiver  - Include patient/family/discharge partner in discharge Jules Gresham

## 2021-09-12 NOTE — PROGRESS NOTES
CT scan 9/11/21 reviewed. Moderate left hydroureteronephrosis down to the bladder.  This is likely due to obstruction related to an irregular mass involving the urinary bladder extending into the left ureterovesicular junction.  The mass extends into the pe

## 2021-09-13 PROCEDURE — 99223 1ST HOSP IP/OBS HIGH 75: CPT | Performed by: NURSE PRACTITIONER

## 2021-09-13 NOTE — CM/SW NOTE
PT saw pt and is recommending HH - pt can continue her PT at [facility]. Palliative Care to see for Wendy Lopez discussion.

## 2021-09-13 NOTE — PLAN OF CARE
Received patient A/Ox4, RA, NSR on tele at 0700  Family meeting with palliative  Denying pain  Up in chair during the day  Voiding per nena, post void residual only showing 51cc  Dressing to right ankle ulcer changed  Daughters at bedside during the [employer] Assess pt frequently for physical needs  - Identify cognitive and physical deficits and behaviors that affect risk of falls.   - [place] fall precautions as indicated by assessment.  - Educate pt/family on patient safety including physical limitations  -

## 2021-09-13 NOTE — PROGRESS NOTES
[facility] Liaison received palliative referral for community PC services. Waiting to obtain insurance (verification/authorization) prior to pursuing.     3:29p Insurance verified liaison to follow up.     [address] [place] [address]

## 2021-09-13 NOTE — PROGRESS NOTES
UROLOGY NOTE    Spoke with nursing staff. Palliative services saw patient earlier today and plan is to continue with services at home. No plan for any radiation or surgical services at this time. Okay to d'c home from  perspective.    Outpatient foll

## 2021-09-13 NOTE — CONSULTS
[facility] 63  [id number]  Hospital Day #4  Date of Consult:   9/13/2021        Reason for Consultation:      Consult requested by Dr. 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 Andrea Coe   • Venous insufficiency    • Ventral hernia 8/11/2014     Past Surgic HERNIA REPAIR;  Surgeon: Stephanie Essex, DO;  Location: [facility] MAIN OR   • PATIENT DOCUMENTED NOT TO HAVE EXPERIENCED ANY OF THE FOLLOWING EVENTS N/A 8/25/2014    Procedure: LUMBAR EPIDURAL;  Surgeon: Ifeoma Anderson MD;  Location: [address] [place] [address] INFECTION PROPHYLAXIS. N/A 9/15/2015    Procedure: LUMBAR EPIDURAL;  Surgeon: Allyson Eli MD;  Location: [address] [place] [address] FOR PAIN MANAGEMENT   • TOTAL ABDOM HYSTERECTOMY     • UMBILICAL HERNIA REPAIR  3/31/14    DR. WORKMAN         Social History:      Donnald Galeazzi (K-DUR) CR tab 10 mEq, 10 mEq, Oral, Daily  •  rOPINIRole HCl (REQUIP) tab 0.5 mg, 0.5 mg, Oral, Nightly  •  torsemide (DEMADEX) tab 40 mg, 40 mg, Oral, Daily  •  Umeclidinium Bromide (INCRUSE ELLIPTA) 62.5 MCG/INH inhaler 1 puff, 1 puff, Inhalation, Daily Registry. PATIENT STATED HISTORY: (As transcribed by Technologist)           FINDINGS:    Lung bases demonstrate mild to moderate basilar atelectasis.      Please note the exam is somewhat limited without intravenous or oral contrast.  There is a stable at 5:58 PM       Finalized by (CST): Kelle Wong MD on 9/11/2021 at 6:02 PM     US KIDNEY/BLADDER (GVD=[id number])  Narrative: PROCEDURE:  US KIDNEY/BLADDER (JUL=[id number])     COMPARISON:  [facility] , CT, CT ABDOMEN+PELVIS KIDNEYSTONE 2D RNDR(NO IV,NO ORAL)(CPT=74176) to person, place and time   Psychiatric: Mood - pleasant      Palliative Performance Scale: 55%    Palliative Performance Scale   % Ambulation Activity Level Self-Care Intake Consciousness   100 Full Normal Full   Normal Full   90 Full No disease  Normal F cancer has progressed. In discussing potential symptoms ahead she stated if she needs to return to the hospital she wants that option. She understands her cancer will progress and may reach a point where symptom management is no longer effective. They are Increase visits according to symptoms (more robust than palliative care)     Palliative Care Services:  1) Usually visit once per 2-3 weeks  2) Perform GOC discussions  3) Follow up on symptom management    The daughters have an understanding hospice would follow.     [address] [facility]  Phone [phone]  9/13/2021, 9:55 AM

## 2021-09-13 NOTE — CM/SW NOTE
Order received for [employer]. Called Naheed at [facility] who had received the referral from [facility]. They will meet with pt and follow her at [facility].

## 2021-09-13 NOTE — PLAN OF CARE
Assumed care of pt this pm. Pt is alert and oriented. NSR w/ PVCs; Tele. RA. Denies pain. Tracie Castleman in place with adequate urine output. Palliative and wound care to see. R ankle dressing c/d/i. Pt is resting comfortably will continue to monitor.

## 2021-09-13 NOTE — PROGRESS NOTES
[facility]  Progress Note    Shelly Mabry Patient Status:  Inpatient    1933 MRN [id number]   [facility] 7NE-A Attending Jaja Miller MD   Hosp Day # 4 PCP Bre Odell MD       SUBJECTIVE:  Up in chair , Daughter at bedsid g, Intravenous, Q24H  phenazopyridine (PYRIDIUM) tab 100 mg, 100 mg, Oral, TID PRN        Exam:  Gen: No acute distress, alert and oriented x3, no focal neurologic deficits  Pulm: Lungs clear, normal respiratory effort  CV: Heart with regular rate and rhyt ([facility] [address]     CHF (congestive heart failure) (HCC)     Recurrent UTI     Pre-diabetes     Parotid mass     Thrombocytopenia (HCC)     Other emphysema (HCC)     Gross hematuria     Acute cystitis with hematuria     Urinary retention     Malignant neoplasm of ur

## 2021-09-14 VITALS
WEIGHT: 200 LBS | SYSTOLIC BLOOD PRESSURE: 156 MMHG | OXYGEN SATURATION: 96 % | HEIGHT: 63 IN | TEMPERATURE: 98 F | HEART RATE: 74 BPM | RESPIRATION RATE: 16 BRPM | BODY MASS INDEX: 35.44 KG/M2 | DIASTOLIC BLOOD PRESSURE: 87 MMHG

## 2021-09-14 RX ORDER — PHENAZOPYRIDINE HYDROCHLORIDE 95 MG/1
95 TABLET ORAL 3 TIMES DAILY PRN
Qty: 10 TABLET | Refills: 0 | Status: SHIPPED | OUTPATIENT
Start: 2021-09-14 | End: 2021-09-17

## 2021-09-14 RX ORDER — CEPHALEXIN 500 MG/1
CAPSULE ORAL
Qty: 30 CAPSULE | Refills: 0 | Status: SHIPPED | OUTPATIENT
Start: 2021-09-14

## 2021-09-14 RX ORDER — CEPHALEXIN 500 MG/1
500 CAPSULE ORAL EVERY 8 HOURS SCHEDULED
Status: DISCONTINUED | OUTPATIENT
Start: 2021-09-14 | End: 2021-09-14

## 2021-09-14 NOTE — DISCHARGE SUMMARY
[facility]  Discharge Summary    [facility] Patient Status:  Inpatient    1933 MRN [id number]   [facility] 7NE-A Attending Ulysses Sager, MD   [address] Day # 5 PCP Chel Alexandra MD     Date of Admission: 2021    Date of Disc left kidney     Palliative care encounter     Goals of care, counseling/discussion      Reason for Admission: UTI    Physical Exam: See progress note        Disposition: snf     Discharge Condition: Stable    Discharge Medications: Current Discharge [employer] hours. Albuterol Sulfate HFA (PROAIR HFA) 108 (90 Base) MCG/ACT Inhalation Aero Soln  Inhale 2 puffs into the lungs every 6 (six) hours as needed for Wheezing.     albuterol sulfate (2.5 MG/3ML) 0.083% Inhalation Nebu Soln  Take 2.5 mg by nebulization ev

## 2021-09-14 NOTE — CM/SW NOTE
Pt/family requesting [place] RN for help with dressing changes at pt's assisted living. Referrals sent for [place] via [employer] - waiting for responses. HHO/F2F written.

## 2021-09-14 NOTE — CM/SW NOTE
[facility]  can accept pt for [facility] for RN and PT. Pt will have to use [facility]  for PT as well in order for them to see her for RN. Family is agreeable to this. [facility]   [facility] is scheduled to  pt a

## 2021-09-14 NOTE — PLAN OF CARE
Assumed care @ 0730. R ankle dressing changed by wound care. Adequate urine output. Urine clear, yellow. Up to the chair        NURSING DISCHARGE NOTE    Discharged Other, (see nursing note) via Ambulance.   Accompanied by Support staff  Belongings Taken by

## 2021-09-14 NOTE — PLAN OF CARE
Assumed care of pt this pm. Pt is alert and oriented. NSR;Tele. RA. No c/o pain. N[place] Knee in place and draining adequate urine. R ankle dressing c/d/i. Pt resting comfortably, will continue to monitor.

## 2021-09-14 NOTE — HOME CARE LIAISON
Patient and daughter Leonid Ree provided with list of [facility] providers from [address], patient choice is [facility]. Agency reserved in [address] and contact information placed on AVS.   Financial interest disclosure provided to patient. Jae Villalba updated.

## 2021-09-14 NOTE — PALLIATIVE CARE NOTE
HCPOA forms received from daughters, will scan in the chart. Tor Route is discharging to [employer] this afternoon.

## 2021-09-14 NOTE — CONSULTS
[facility]  Report of Inpatient Wound Care Consultation    Britton Parada Patient Status:  Inpatient    1933 MRN [id number]   [facility] 7NE-A Attending Mich Verde MD   Hosp Day # 5 PCP Nichola Boast, MD     [facility] • HYSTERECTOMY     • INJECTION, W/WO CONTRAST, DX/THERAPEUTIC SUBSTANCE, EPIDURAL/SUBARACHNOID; LUMBAR/SACRAL N/A 8/25/2014    Procedure: LUMBAR EPIDURAL;  Surgeon: Walter Leigh MD;  Location: [address] [place] [address]   • INJECTION, W/WO CONTRAS 8/27/2015    Procedure: LUMBAR EPIDURAL;  Surgeon: Deanna Hernández MD;  Location: [address] [place] [address]   • PATIENT DOCUMENTED NOT TO HAVE EXPERIENCED ANY OF THE FOLLOWING EVENTS N/A 9/15/2015    Procedure: LUMBAR EPIDURAL;  Surgeon: Deanna Hernández Wound Location Orientation: Right; Lateral  Wound Description (Comments): Right outer ankle ulcer, lorenzo boot      Assessments 9/14/2021  8:28 AM   Wound Image     Drainage Amount Small   Drainage Description Yellow; Serous   Wound Length (cm) 1.8 cm   Wound

## 2021-09-14 NOTE — CM/SW NOTE
09/14/21 1207   Discharge disposition   Expected discharge disposition [facility]   Post Acute Care Provider   [facility])   Additional Home Care/Hospice Provider [facility]   Outpatient services Palliative   Discharge transportatio

## 2021-09-14 NOTE — PROGRESS NOTES
[facility] Palliative Liaison received palliative referral for community PC services. Met with P[facility]co Lowry bedside to discuss [facility] services. [facility] PC services discussed and patient is agreeable to our [facility] services upon DC home/[place].       Israel Thurston

## 2021-11-09 ENCOUNTER — TELEPHONE (OUTPATIENT)
Dept: INTERNAL MEDICINE CLINIC | Facility: CLINIC | Age: 86
End: 2021-11-09

## 2021-11-09 NOTE — TELEPHONE ENCOUNTER
Paperwork received from [facility] in regards to \"delayed certification\"  Pt had PCP listed Ruth Mcgee   Last OV 11/5/20 w/ [address]    AS to complete? Please advise. Paperwork placed in AS bin for review.

## 2021-11-09 NOTE — TELEPHONE ENCOUNTER
[address], spoke with Samantha Haque  States that paperwork is for \"Delayed Certification\" and is back dated to an order AS signed on 10/8/2020    FWD to AS

## 2024-04-09 NOTE — TELEPHONE ENCOUNTER
Addended by: MARTA RAY on: 4/9/2024 01:14 PM     Modules accepted: Orders     Refill sent, pass per protocol.

## 2024-12-01 NOTE — CERTIFICATION
**Certification      PHYSICIAN Certification of Need for Inpatient Hospitalization - Initial Certification    Patient will require inpatient services that will reasonably be expected to span two midnight's based on the clinical documentation in H+P.    Base 0 = swallows foods/liquids without difficulty

## 2025-01-29 NOTE — TELEPHONE ENCOUNTER
Daily Note    Today's date: 25  Patient name: Rich Abel  : 1954  MRN: [id number]  Referring provider: Abiel Green,*  Dx:   Encounter Diagnosis     ICD-10-CM    1. Primary osteoarthritis of left hip  M16.12           Start Time: 1445  Stop Time: 1530  Total time in clinic (min): 45 minutes      Subjective: Rich reports increased soreness following previous session. He notes that it was not bad the next day, but the following day, his soreness was severe. It went away the next day, and he feels fine now. He note adherence to HEP.    Objective: See treatment diary below.    Assessment: Rich tolerated treatment well with minimal cuing. TE's were performed with increased reps and increased resistance. No new TE's were performed today. Following treatment, the patient demonstrated fatigue and would benefit from continued physical therapy.    Plan: Continue per plan of care.  Progress treatment as tolerated.           POC expires Unit limit Auth  expiration date PT/OT + Visit Limit?   25 N/a 25  BOMN                 Visit/Unit Tracking  AUTH Status:  Date    BOMN Used 13 14 15  5 6 7 8 9 10 11 12    Remaining                   Precautions: L JEMMA posterior approach on 24, HTN, T2DM, Skin Cancer         Manuals    L hip PROM                                       Re-evaluation  TS           Neuro Re-Ed             Weight Shifting - rockerboard        2'/2'   2'/2'  2 min    Tandem walk foam         5 laps  5 laps    STS walk foam         5 laps 5 laps                                                        Ther Ex             Pt. Education              Nu-Step for cardiovascular endurance and LE strength  10' L3 upright 10' L4  Nustep L7x10' 10' L7  10' L7   10' L7 w/ UE 10' L7  10' L7  10 min L7 with UE at 9    SLR    3x10 VC    March supine 3x10      Heel Slides        3x20 w/ PB  RETURNED A CALL "under foot      Clamshell        3x10 BTB 3\"     SL hip ABD left   2x10  3x10         Hip 4 way         3x10  GTB 2x 10 3 ways     LAQ             Matrix flex 70# 3x12 70# 3x12     3x10 65# 3x10 70#  70# 3x 10    Matrix ext 45# 3x12 45# 3x12     3x10 40# 3x10 45#  45# 3x 10    Quad Set             Glute Set > Bridge    3x5 3\" 3x10 2\"  3x10 5\" 3x10 5\" BTB     Single leg bridge    2x5 ea 3\"         Ankle Pumps             Lateral walks at bar             Isometric hip ab at wall    15x5\" ea 2x10 5\" ea                                  Ther Activity             STS/ squats   3x10        3x10     TG / Wall squats L22 3x12  L22 3x12  L22 3x15  L22 3x15 L22 3x15     FSU       6\" 3x10   8\" 2x10  8\"  up and over  3x 10    Side step up   8\" 2 x 10     6\" 3x10   8\" 2x10  8\" 2 x 10    Resisted walking 10x 18# fw bw  20# x10          Resisted walking L/R   BTB 5 laps  BTB 5 laps        Monster walk   BTB 5 laps  BTB 3 laps        Lunge w/ slider   3x5 w/ UE          5x STS, TUG             Gait Training             SPC                          Modalities             CP/ MHP          Ice as needed at HEP  10 - 20 min on and 60 min off.                   Access Code: [email or web address]  URL: [email or web address]  Date: 12/06/2024  Prepared by: Cam Walker     Exercises  - Seated Ankle Pumps  - 2 x daily - 7 x weekly - 3 sets - 10 reps  - Supine Gluteal Sets  - 2 x daily - 7 x weekly - 2 sets - 10 reps - 5 hold  - Supine Quadricep Sets  - 2 x daily - 7 x weekly - 2 sets - 10 reps - 5 hold  - Seated Long Arc Quad  - 2 x daily - 7 x weekly - 3 sets - 10 reps    Patient Education  - JEMMA Posterior Precautions  - Total Hip Replacement (Posterior Approach): Surgery Overview     Ede Gay, PT  1/29/2025,3:20 PM  "

## (undated) DEVICE — SUTURE ETHILON 3-0 PS-1

## (undated) DEVICE — Device: Brand: OLYMPUS

## (undated) DEVICE — PLASTC TOOMEY SYRNG DISP

## (undated) DEVICE — CYSTO CDS-LF: Brand: MEDLINE INDUSTRIES, INC.

## (undated) DEVICE — URINE DRAINAGE BAG,BAG, NEEDLE SAMPLING, DRAIN TUBE: Brand: DOVER

## (undated) DEVICE — SOL H2O 1000ML BTL

## (undated) DEVICE — HF-RESECTION ELECTRODE PLASMA-OVALBUTTON BUTTON, OVAL, 24 FR., 12°-30°, ESG TURIS: Brand: OLYMPUS

## (undated) DEVICE — TIGERTAIL 5F FLXTIP 70CM

## (undated) DEVICE — STERILE SYNTHETIC POLYISOPRENE POWDER-FREE SURGICAL GLOVES WITH HYDROGEL COATING, SMOOTH FINISH, STRAIGHT FINGER: Brand: PROTEXIS

## (undated) DEVICE — 4.0MM LONG PILOT DRILL: Brand: TRIGEN

## (undated) DEVICE — Device

## (undated) DEVICE — STERILE POLYISOPRENE POWDER-FREE SURGICAL GLOVES: Brand: PROTEXIS

## (undated) DEVICE — CONVERTORS STOCKINETTE: Brand: CONVERTORS

## (undated) DEVICE — REM POLYHESIVE ADULT PATIENT RETURN ELECTRODE: Brand: VALLEYLAB

## (undated) DEVICE — ZIPWIRE GUIDEWIRE .035X150 STR

## (undated) DEVICE — SOL  .9 1000ML BTL

## (undated) DEVICE — ORTHO CDS-LF: Brand: MEDLINE INDUSTRIES, INC.

## (undated) DEVICE — HF-RESECTION ELECTRODE PLASMALOOP LOOP, MEDIUM, 24 FR., 12°/16°, ESG TURIS: Brand: OLYMPUS

## (undated) DEVICE — GAUZE SPONGES,12 PLY: Brand: CURITY

## (undated) DEVICE — SHEET,DRAPE,70X100,STERILE: Brand: MEDLINE

## (undated) DEVICE — KENDALL SCD EXPRESS SLEEVES, KNEE LENGTH, MEDIUM: Brand: KENDALL SCD

## (undated) DEVICE — DRAPE,U/SHT,SPLIT,FILM,60X84,STERILE: Brand: MEDLINE

## (undated) DEVICE — SYRINGE 30ML LL TIP

## (undated) DEVICE — C-ARMOR C-ARM EQUIPMENT COVERS CLEAR STERILE UNIVERSAL FIT 12 PER CASE: Brand: C-ARMOR

## (undated) DEVICE — C-ARM: Brand: UNBRANDED

## (undated) DEVICE — SCD SLEEVE KNEE HI BLEND

## (undated) DEVICE — 3.0MM X 1000MM BALL TIP GUIDE ROD: Brand: TRIGEN

## (undated) DEVICE — CATH URET CONE TIP 8FR 138008

## (undated) DEVICE — LIGHT HANDLE

## (undated) DEVICE — POSITIONER OR KT PT CR

## (undated) DEVICE — SOL  .9 3000ML

## (undated) DEVICE — 7.0MM COMPRESSION SCREW DRILL: Brand: TRIGEN

## (undated) DEVICE — SUTURE VICRYL 2-0 CP-1

## (undated) DEVICE — GUIDE PIN 3.2MM X 343MM: Brand: TRIGEN

## (undated) DEVICE — INTERTAN LAG SCREW DRILL: Brand: TRIGEN

## (undated) DEVICE — ZIPWIRE GUIDEWIRE .038X150 STR

## (undated) DEVICE — STERILE SYNTHETIC POLYISOPRENE POWDER-FREE SURGICAL GLOVES WITH HYDROGEL COATING: Brand: PROTEXIS

## (undated) DEVICE — INTERIGHTAN 7.0MM COMPRESSION                                    SCREW STARIGHTER DRILL: Brand: TRIGEN

## (undated) DEVICE — CAUTERY CORD DISP E0503

## (undated) NOTE — IP AVS SNAPSHOT
[address]            (For Outpatient Use Only) Initial Admit Date: 3/20/2020   Inpt/Obs Admit Date: Inpt: 3/20/20 / Obs: N/A   Discharge Date:    Gomez Captain:  [de-identified]   MRN: [de-identified]   CSN: [id number]   CEID: [id number] Subscriber Name:  Michael Simmons :    Subscriber ID:  Pt Rel to Subscriber:    Hospital Account Financial Class: Medicare    2020

## (undated) NOTE — LETTER
[facility] [address], [address] [place] [address]    Consent for Anesthesia   1.   IBhupinder agree to be cared for by a physician anesthesiologist alone and/or with a nurse anesthetist, who is specially trained to monitor me and give me allergic reactions to medications, injury to my airway, heart, lungs, vision, nerves, or muscles and in extremely rare instances death. 5. My doctor has explained to me other choices available to me for my care (alternatives).   6. Pregnant Patients (“epid Printed: 6/13/2021 at 9:28 AM    Medical Record #: [id number]                                            Page 1 of 1

## (undated) NOTE — LETTER
Robina Valenzuela 182  [address] [facility] [address], 209 [facility]  Authorization for Surgical Operation and Procedure     Date:___________                                                                                                         Time:__________ 4.   Should the need arise during my operation or immediate post-operative period, I also consent to the administration of blood and/or blood products.   Further, I understand that despite careful testing and screening of blood or blood products by maulik 8.   I recognize that in the event my procedure results in extended X-Ray/fluoroscopy time, I may develop a skin reaction. 9.  If I have a Do Not Attempt Resuscitation (DNAR) order in place, that status will be suspended while in the operating room, proc 1. ITu agree to be cared for by an anesthesiologist, who is specially trained to monitor me and give me medicine to put me to sleep or keep me comfortable during my procedure    I understand that my anesthesiologist is not an employee or age 5. My doctor has explained to me other choices available to me for my care (alternatives).   6. Pregnant Patients (“epidural”):  I understand that the risks of having an epidural (medicine given into my back to help control pain during labor), include itchi

## (undated) NOTE — LETTER
Patient Name: Shelly Mabry  : 1933  MRN: [id number]  Patient Address: [address] [place] [address]      Coronavirus Disease 2019 (COVID-19)     [facility] is committed to the safety and well-being of our patien symptoms carefully. If your symptoms get worse, call your healthcare provider immediately. 3. Get rest and stay hydrated. 4. If you have a medical appointment, call the healthcare provider ahead of time and tell them that you have or may have COVID-19. without the use of fever-reducing medications; and  · Improvement in respiratory symptoms (e.g., cough, shortness of breath); and  · At least 10 days have passed since symptoms first appeared OR if asymptomatic patient or date of symptom onset is unclear t convalescent plasma donors must:    · Have had a confirmed diagnosis of COVID-19  · Be symptom-free for at least 14 days*    *Some people will be required to have a repeat COVID-19 test in order to be eligible to donate.  If you’re instructed by Bailey woodall

## (undated) NOTE — LETTER
[facility]  Phone: [phone]  Right Fax: [phone]    ADDRESSEE INFORMATION: SENDER INFORMATION:   To:   Dilia Duvall MD From: Rick Mcelroy RN     Department: Pre-Admission Testing   Fax Number: [phone] Date: 2

## (undated) NOTE — LETTER
Robina Valenzuela [address]  [address] [facility] [address], 209 [facility]  Authorization for Surgical Operation and Procedure     Date:___________                                                                                                         Time:__________ 4.   Should the need arise during my operation or immediate post-operative period, I also consent to the administration of blood and/or blood products.   Further, I understand that despite careful testing and screening of blood or blood products by maulik 8.   I recognize that in the event my procedure results in extended X-Ray/fluoroscopy time, I may develop a skin reaction. 9.  If I have a Do Not Attempt Resuscitation (DNAR) order in place, that status will be suspended while in the operating room, proc 1. IPayam agree to be cared for by an anesthesiologist, who is specially trained to monitor me and give me medicine to put me to sleep or keep me comfortable during my procedure    I understand that my anesthesiologist is not an employee or age 5. My doctor has explained to me other choices available to me for my care (alternatives).   6. Pregnant Patients (“epidural”):  I understand that the risks of having an epidural (medicine given into my back to help control pain during labor), include itchi

## (undated) NOTE — IP AVS SNAPSHOT
[address]            (For Outpatient Use Only) Initial Admit Date: 9/9/2021   Inpt/Obs Admit Date: Inpt: 9/9/21 / Obs: N/A   Discharge Date:    Malcolm Villareal:  [de-identified]   MRN: [de-identified]   CSN: [id number]   CEID: [id number] TERTIARY INSURANCE   Payor:  Plan:    Group Number:  Insurance Type:    Subscriber Name:  Subscriber :    Subscriber ID:  Pt Rel to Subscriber:    Hospital Account Financial Class: Medicare    2021

## (undated) NOTE — ED AVS SNAPSHOT
Neto Huang   MRN: [id number]    Department:  [facility] Emergency Department   Date of Visit:  1/3/2020           Disclosure     Insurance plans vary and the physician(s) referred by the ER may not be covered by your plan.  Please contact your tell this physician (or your personal doctor if your instructions are to return to your personal doctor) about any new or lasting problems. The primary care or specialist physician will see patients referred from the [facility] Emergency Department.  Maddie Kenny

## (undated) NOTE — IP AVS SNAPSHOT
[address]            (For Outpatient Use Only) Initial Admit Date: 6/12/2021   Inpt/Obs Admit Date: Inpt: 6/12/21 / Obs: N/A   Discharge Date:    Prasanna Chappell:  [de-identified]   MRN: [de-identified]   CSN: [id number]   CEID: [id number] Pt Rel to Subscriber: SELF   TERTIARY INSURANCE   Payor:  Plan:    Group Number:  Insurance Type:    Subscriber Name:  Subscriber :    Subscriber ID:  Pt Rel to Subscriber:    Hospital Account Financial Class: Medicare    Cass 15, 2021

## (undated) NOTE — LETTER
Robina Valenzuela 182  [address] [facility] [address], 209 [facility]  Authorization for Surgical Operation and Procedure     Date:___________                                                                                                         Time:__________ can occur: fever and allergic reactions, hemolytic reactions, transmission of diseases such as Hepatitis, AIDS and Cytomegalovirus (CMV) and fluid overload.   In the event that I wish to have an autologous transfusion of my own blood, or a directed donor tr the applicable recovery period ends for purposes of reinstating the DNAR order.   10. Patients having a sterilization procedure: I understand that if the procedure is successful the results will be permanent and it will therefore be impossible for me to ins give me medicine and do additional procedures as necessary. Some examples are: Starting or using an “IV” to give me medicine, fluids or blood during my procedure, and having a breathing tube placed to help me breathe when I’m asleep (intubation).  In the ev rare but potential complications include headache, bleeding, infection, seizure, irregular heart rhythms, and nerve injury.     I can change my mind about having anesthesia services at any time before I get the medicine.    _________________________________

## (undated) NOTE — IP AVS SNAPSHOT
Patient Demographics     Address  [address]  [address] Phone  [phone] [facility]  [phone] [facility] *Preferred* E-mail Address  [email or web address]      Emergency Contact(s)     Name Relation Home Work DEBORAH Brown Provider Morning Afternoon Evening As Needed   acetaminophen 325 MG Tabs  Commonly known as: TYLENOL      Take 2 tablets (650 mg total) by mouth every 4 (four) hours as needed. Deanne Upton MD         Align Caps      Take 1 capsule by mouth daily. Generic drug: dronedarone HCl      Take 400 mg by mouth 2 (two) times daily with meals. Nystatin [id number] UNIT/GM Powd      Apply 1 Application topically 2 (two) times daily as needed (Apply to groin area for rash).           omeprazole 20 MG Cpdr  C acetaminophen (TYLENOL) tab 650 mg 06/14/21 1514 Given      [id number] acetaminophen (TYLENOL) tab 650 mg 06/15/21 0338 Given      [id number] aspirin EC tab 81 mg 06/14/21 2003 Given      [id number] aspirin EC tab 81 mg 06/15/21 0904 Given      [id number] carv ([facility])   Creatinine 1.25 0.55 - 1.02 mg/dL [facility])   BUN/CREA Ratio 28.8 10.0 - 20.0 [facility])   Calcium, Total 8.6 8.5 - 10.1 mg/dL [facility])   Calculated Osmolality 289 275 - 295 mOsm/kg — [facility] ([facility])   GFR, Non-African A 128  Resistant     Piperacillin + Tazobactam <=4  Sensitive     Trimethoprim/Sulfa >=320  Resistant              Linear View                   MRSA/SA Scrn by PCR:[facility] only [[id number]]  (Normal) Collected: 06/12/21 2008    Order Status: Completed pulmonary disease) ([facility]     No O2   • COPD exacerbation ([facility] 1/7/2020   • Diabetes mellitus type 2, diet-controlled ([facility]    • Dyspnea on exertion 3/20/2020   • GERD (gastroesophageal reflux disease)    • H1N1 influenza    • High blood pressure    • HTN INJECTION, W/WO CONTRAST, DX/THERAPEUTIC SUBSTANCE, EPIDURAL/SUBARACHNOID; LUMBAR/SACRAL N/A 8/27/2015    Procedure: LUMBAR EPIDURAL;  Surgeon: Xiomara Ocasio MD;  Location: [facility] FOR PAIN MANAGEMENT   • INJECTION, W/WO CONTRAST, DX/THERAPEUTIC SUBST Surgeon: Eric Grider MD;  Location: [facility] FOR PAIN MANAGEMENT   • PATIENT [address] FOR IV ANTIBIOTIC SURGICAL SITE INFECTION PROPHYLAXIS.  N/A 8/12/2015    Procedure: LUMBAR EPIDURAL;  Surgeon: Eric Grider MD;  Location: [facility] Oral Tab CR, TAKE ONE TABLET BY MOUTH EVERY DAY, Disp: 30 tablet, Rfl: 11, 6/11/2021 at Unknown time  lisinopril 10 MG Oral Tab, Take 1 tablet (10 mg total) by mouth nightly., Disp: 30 tablet, Rfl: 5, 6/11/2021 at Unknown time  carvedilol 12.5 MG Oral Tab, Take 1 tablet (10 mg total) by mouth daily. , Disp: 1 tablet, Rfl: 0, 6/11/2021 at Unknown time  omeprazole 20 MG Oral Capsule Delayed Release, Take 1 capsule (20 mg total) by mouth every morning before breakfast., Disp: 1 capsule, Rfl: 0, 6/11/2021 at [facility] 06/12/2021    BILT 0.4 06/12/2021    TP 7.0 06/12/2021    AST 25 06/12/2021    ALT 16 06/12/2021    INR 1.07 06/12/2021    PTP 14.2 06/12/2021    MG 1.9 06/13/2021       Imaging:  XR CHEST AP/PA (1 VIEW) (CPT=71045)    Result Date: 6/12/2021  PROCEDURE:  X calvarial fracture. CONCLUSION:  No acute intracranial hemorrhage or depressed calvarial fracture. Diffuse cerebral and cerebellar atrophy with chronic microvascular ischemic changes of aging and old right occipital lobe infarction.     Dictated Hematuria     DARIEN (acute kidney injury) ([facility]     Retention of urine     Chronic obstructive pulmonary disease (HCC)     Unsteady gait     Closed fracture of left hip ([facility])     Closed fracture of left hip, initial encounter ([facility])[CJ.1]      D/w daughter a pressors. PM&R has now been consulted in order to assess patient's functional status and make recommendations for rehabilitation. Just transferred back to the floors.   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 N/A 8/27/2015    Procedure: LUMBAR EPIDURAL;  Surgeon: Zhang Swift MD;  Location: [facility] FOR PAIN MANAGEMENT   • PATIENT [address] FOR IV ANTIBIOTIC SURGICAL SITE INFECTION PROPHYLAXIS.  N/A 9/15/2015    Procedure: LUM well nourished, NAD  Eyes: conjunctiva and lids intact, pupils are equal and round  ENMT: external inspection of ears and nose within normal limits.  Normal functional hearing  Neck: supple, symmetrical, no thyromegaly appreciated  Lymph: no cervical and no CHF  afib[SK. 2]        Recommendations:[SK.1] subacute rehab[SK.2]    Patient meets medical and rehab criteria to qualify for acute inpatient     Patient does meet medical or rehab criteria for acute inpatient rehab.  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  N/A 9/15/2015    Procedure: LUMBAR EPIDURAL;  Surgeon: Jose Cueva MD;  Vivienne Morgan and legs elevated and AROM; 95/47[TF.2]      AM-PAC '6-Clicks' INPATIENT SHORT FORM - BASIC MOBILITY  How much difficulty does the patient currently have. ..  -   Turning over in bed (including adjusting bedclothes, sheets and blankets)?: A Lot   -   Sittin of the greater and lesser trochanters. There is slight lateral and cephalad migration of the femoral shaft in relation to the femoral head. And underwent a L hip ORIF on 6/13/21 and is now referred to skilled PT.[TF.2]  Pertinent comorbidities and personal Stand[TF.2] at assistance level:[TF.1] maximum assistance 1PA[TF.2]     Goal #3 Patient is able to ambulate[TF.1] 5[TF.2] feet with assist device:[TF.1] walker - rolling[TF.2] at assistance level:[TF.1] moderate assistance[TF.2]     Goal #4    Goal #5    G intertrochanteric femur fx 6/13/21. RRT called 6/13/21 noc d/t hypotension and patient was transferred to ICU. PMH significant for anxiety, Afib, DMII, COPD, CVA, bladder cancer    Prior [facility] admit: 3/2021 for right hydronephrosis.       Problem List  P FOR PAIN MANAGEMENT   • INJECTION, W/WO CONTRAST, DX/THERAPEUTIC SUBSTANCE, EPIDURAL/SUBARACHNOID; LUMBAR/SACRAL N/A 9/8/2014    Procedure: LUMBAR EPIDURAL;  Surgeon: Elgin Mosqueda MD;  Location: [address] [place] [address]   • INJECTION, W/WO CONTRAS Procedure: LUMBAR EPIDURAL;  Surgeon: Gianna العراقي MD;  Location: [facility] FOR PAIN MANAGEMENT   • PATIENT [address] FOR IV ANTIBIOTIC SURGICAL SITE INFECTION PROPHYLAXIS.  N/A 8/25/2014    Procedure: LUMBAR EPIDURAL;  Surgeon: Samantha Oswald self-stated goal is n/a     OBJECTIVE  Precautions: Bed/chair alarm;Limb alert - left  Fall Risk: High fall risk    WEIGHT BEARING RESTRICTION  Weight Bearing Restriction: L lower extremity           L Lower Extremity: Weight Bearing as Tolerated    PAIN A alert, oriented x 4 and able to follow simple motor commands consistently. Supine to EOB with max A and use of rail, HOB elevated. EOB sitting with min external assist to maintain. Sit to stand from EOB with max A.   Patient was able to reach full diane activities of daily living, rest and sleep, work, leisure and social participation.      The patient is functioning below her previous functional level and would benefit from skilled inpatient OT to address the above deficits, maximizing patient’s ability t Bhanu Natarajan OT on 6/14/2021  3:56 PM                     Video Swallow Study Notes    No notes of this type exist for this encounter. SLP Notes    No notes of this type exist for this encounter.      Immunizations     Name Date      [facility]

## (undated) NOTE — MR AVS SNAPSHOT
Pieter Mtz  [address] [facility] [address]  [phone]               Thank you for choosing us for your health care visit with [facility] NURSE.   We are glad to serve you and happy to provide you with this summary furosemide 20 MG Tabs   TAKE 1 TABLET BY MOUTH DAILY   Commonly known as:  LASIX           lisinopril 10 MG Tabs   TAKE 1 TABLET BY MOUTH EVERY EVENING   Commonly known as:  PRINIVIL,ZESTRIL           MULTAQ 400 MG Tabs   Generic drug:  dronedarone HCl

## (undated) NOTE — LETTER
Robina Valenzuela [address] [facility] [address], [address] [facility]  Authorization for Surgical Operation and Procedure   Date:___________                                                                                            Time:__________  1.  I hereby aut fever and allergic reactions, hemolytic reactions, transmission of diseases such as Hepatitis, AIDS and Cytomegalovirus (CMV) and fluid overload. In the event that I wish to have an autologous transfusion of my own blood, or a directed donor transfusion. applicable recovery period ends for purposes of reinstating the DNAR order.   10. Patients having a sterilization procedure: I understand that if the procedure is successful the results will be permanent and it will therefore be impossible for me to insemin

## (undated) NOTE — MR AVS SNAPSHOT
[place] [facility]  [address] [facility] [address]  [phone]               Thank you for choosing us for your health care visit with Page Walters DO.   We are glad to serve you and happy to provide you with this summ 2 sprays by Nasal route daily.    Commonly known as:  FLONASE           furosemide 20 MG Tabs   TAKE 1 TABLET BY MOUTH DAILY   Commonly known as:  LASIX           lisinopril 10 MG Tabs   TAKE 1 TABLET BY MOUTH EVERY EVENING   Commonly known as:  Catherine Guerrero ? Cover porch steps with gritty weather proof paint. ? Pay attention to curbs and other changes in surfaces when out in the community. ? Take care when walking on gravel or grassy surfaces. ? Avoid walking on snowy or icy surfaces. ?  Use a cane or walk

## (undated) NOTE — LETTER
[facility] [address], [address] [place] [address]    Consent for Anesthesia   1.   IBhupinder agree to be cared for by a physician anesthesiologist alone and/or with a nurse anesthetist, who is specially trained to monitor me and give me allergic reactions to medications, injury to my airway, heart, lungs, vision, nerves, or muscles and in extremely rare instances death. 5. My doctor has explained to me other choices available to me for my care (alternatives).   6. Pregnant Patients (“epid Printed: 3/2/2021 at 11:55 AM    Medical Record #: [id number]                                            Page 1 of 1

## (undated) NOTE — MR AVS SNAPSHOT
[facility]  [address] [facility] [address]  [phone]               Thank you for choosing us for your health care visit with Adis Mccormick DO.   We are glad to serve you and happy to provide you with this summ 12/27/2016 5.5   01/20/2014 6.3*    No flowsheet data found.     Fasting Blood Sugar (FSB)   Patient must be diagnosed with one of the following:   • Hypertension   • Dyslipidemia   • Obesity (BMI ³30 kg/m2)   • Previous elevated impaired FBS or GTT   … or this or any previous visit. No flowsheet data found. Fecal Occult Blood   Covered Annually No results found for: FOB, OCCULTSTOOL No flowsheet data found.      Barium Enema-   uncomfortable but covered  Covered but uncomfortable   Glaucoma Screening Hepatitis B for Moderate/High Risk       No orders found for this or any previous visit.  Medium/high risk factors:   End-stage renal disease   Hemophiliacs who received Factor VIII or IX concentrates   Clients of institutions for the mentally retarded   P Current Medications          This list is accurate as of: 6/16/17  2:12 PM.  Always use your most recent med list.                acetaminophen 325 MG Tabs   Take 325 mg by mouth every 6 (six) hours as needed for Pain.    Commonly known as:  TYLENOL PROTEIN (URINE DIPSTICK) neg Negative/Trace mg/dL    UROBILINOGEN,SEMI-QN 0.2 0.0 - 1.9 mg/dL    NITRITE, URINE neg Negative    LEUKOCYTES large Negative    APPEARANCE clowdy Clear    URINE-COLOR yellow Yellow    [employer] Lot# [id number] Numeric    Mult

## (undated) NOTE — MR AVS SNAPSHOT
[facility]  [address] [place] [address]  [phone]               Thank you for choosing us for your health care visit with Kulwinder Marquez DO.   We are glad to serve you and happy to provide you with this summ Commonly known as:  CEFTIN           Fluticasone Propionate 50 MCG/ACT Susp   2 sprays by Nasal route daily. Commonly known as:  FLONASE           fluticasone-salmeterol 250-50 MCG/DOSE Aepb   Inhale 1 puff into the lungs every 12 (twelve) hours.    Commo